# Patient Record
Sex: FEMALE | Race: WHITE
[De-identification: names, ages, dates, MRNs, and addresses within clinical notes are randomized per-mention and may not be internally consistent; named-entity substitution may affect disease eponyms.]

---

## 2017-02-10 ENCOUNTER — HOSPITAL ENCOUNTER (OUTPATIENT)
Dept: HOSPITAL 47 - RADECHMAIN | Age: 77
Discharge: HOME | End: 2017-02-10
Payer: MEDICARE

## 2017-02-10 DIAGNOSIS — R01.1: ICD-10-CM

## 2017-02-10 DIAGNOSIS — G45.9: Primary | ICD-10-CM

## 2017-02-10 PROCEDURE — 70450 CT HEAD/BRAIN W/O DYE: CPT

## 2017-02-10 PROCEDURE — 93880 EXTRACRANIAL BILAT STUDY: CPT

## 2017-02-10 PROCEDURE — 93306 TTE W/DOPPLER COMPLETE: CPT

## 2017-02-10 NOTE — ECHOF
Referral Reason:R01.1 heart murmur



MEASUREMENTS

--------

HEIGHT: 157.5 cm

WEIGHT: 83.5 kg

BP: 188/80

RVIDd:   2.8 cm     (< 3.3)

IVSd:   1.3 cm     (0.6 - 1.1)

LVIDd:   4.5 cm     (3.9 - 5.3)

LVPWd:   1.3 cm     (0.6 - 1.1)

IVSs:   1.9 cm

LVIDs:   2.9 cm

LVPWs:   1.8 cm

LA Diam:   3.8 cm     (2.7 - 3.8)

LAESV Index (A-L):   31.58 ml/m

Ao Diam:   3.4 cm     (2.0 - 3.7)

AV Cusp:   2.3 cm     (1.5 - 2.6)

MV EXCURSION:   12.039 mm     (> 18.000)

MV EF SLOPE:   37 mm/s     (70 - 150)

EPSS:   0.4 cm

MV E Rishi:   1.19 m/s

MV DecT:   284 ms

MV A Rishi:   1.33 m/s

MV E/A Ratio:   0.89 

AR PHT:   1040 ms

RAP:   5.00 mmHg

RVSP:   28.31 mmHg







FINDINGS

--------

Sinus rhythm.

This was a technically good study.

The left ventricular size is normal.   There is mild 

concentric left ventricular hypertrophy.   Overall left 

ventricular systolic function is normal with, an EF 

between 55 - 60 %.

The right ventricle is normal in size and function.

LA is midly dilated 29-33ml/m2.

The right atrium is normal in size.

There is mild to moderate aortic valve sclerosis.   

There is mild aortic regurgitation.

The mitral valve leaflets are mildly thickened.   Mild 

mitral annular calcification present.

Mild tricuspid regurgitation present.   Right 

ventricular systolic pressure is normal at < 35 mmHg.

Trace/mild (physiologic)  pulmonic regurgitation.

The aortic root size is normal.

Normal inferior vena cava with normal inspiratory 

collapse consistent with estimated right atrial 

pressure of  5 mmHg.

There is no pericardial effusion.



CONCLUSIONS

--------

1. Sinus rhythm.

2. There is mild aortic regurgitation.

3. The mitral valve leaflets are mildly thickened.

4. Mild mitral annular calcification present.

5. Mild tricuspid regurgitation present.

6. Right ventricular systolic pressure is normal at < 35 

mmHg.

7. Trace/mild (physiologic)  pulmonic regurgitation.

8. The aortic root size is normal.

9. Normal inferior vena cava with normal inspiratory 

collapse consistent with estimated right atrial 

pressure of  5 mmHg.

10. There is no pericardial effusion.

11. This was a technically good study.

12. The left ventricular size is normal.

13. There is mild concentric left ventricular hypertrophy.

14. Overall left ventricular systolic function is normal 

with, an EF between 55 - 60 %.

15. The right ventricle is normal in size and function.

16. LA is midly dilated 29-33ml/m2.

17. The right atrium is normal in size.

18. There is mild to moderate aortic valve sclerosis.





SONOGRAPHER: Thea Short RDCS

## 2017-02-10 NOTE — CT
EXAMINATION TYPE: CT brain wo con

 

DATE OF EXAM: 2/10/2017 2:41 PM

 

COMPARISON: Previous study dated 9/21/2014

 

HISTORY: VICENTE, TIA

 

CT DLP: 1085 mGycm

Automated exposure control for dose reduction was used.

 

FINDINGS: There is evidence of old, bilateral occipital infarct. The left occipital infarct extends i
nto the posterior parietal region.

 

Central structures are midline. There is no evidence of hydrocephalus. No acute focal lesion, mass ef
fect or midline shift is seen. I do not see evidence of intracranial blood.

 

Visualized portions of the paranasal sinuses and mastoids are clear. No depressed skull fracture is s
een.

 

IMPRESSION: 

1. OLD, BILATERAL OCCIPITAL INFARCT.

2. NO ACUTE INTRACRANIAL ABNORMALITY.

## 2017-02-10 NOTE — US
EXAMINATION TYPE: US carotid duplex BILAT

 

DATE OF EXAM: 2/10/2017 2:53 PM

 

COMPARISON: NONE

 

CLINICAL HISTORY: G45.9 tia.

 

EXAM MEASUREMENTS: 

 

RIGHT:  Peak Systolic Velocity (PSV) cm/sec

----- Right CCA:  67.7  

----- Right ICA:   72.1     

----- Right ECA:  99.1   

ICA/CCA ratio:     1.1    

 

RIGHT:  End Diastole cm/sec

----- Right CCA:  11.9   

----- Right ICA:   30.2      

----- Right ECA:  0.0     

 

LEFT:  Peak Systolic Velocity (PSV) cm/sec

----- Left CCA:   69.4  

----- Left ICA:    79.9   

----- Left ECA:  106.9  

ICA/CCA ratio:  1.2  

 

LEFT:  End Diastole cm/sec

----- Left CCA:  15.4  

----- Left ICA:   27.6   

----- Left ECA:  0.0 

 

VERTEBRALS (direction of flow):

Right Vertebral: Antegrade

Left Vertebral: Antegrade

 

TECHNOLOGIST IMPRESSION:  No significant stenosis seen, no elevated velocities, bilateral plaque note
d.

 

Intimal thickening is present. There are some scattered small plaques present.

 

IMPRESSION:  

1. Atheromatous plaquing and intimal thickening without significant flow-limiting stenosis.   

 

 

Criteria for Assigning % of Stenosis / Diameter reduction

(Estimation based on the indirect measurements of the internal carotid artery velocities (ICA PSV).

1.  Normal (no stenosis)=ICA PSV < 125 cm/s: ratio < 2.0: ICA EDV<40 cm/s.

2. Less than 50% stenosis=ICA PSV < 125 cm/s: ratio < 2.0: ICA EDV<40 cm/s.

3.  50 to 69% stenosis=ICA PSV of 125 to 230 cm/s: ration 2.0 ? 4.0: ICA EDV  cm/s.

4.  Greater than 70% stenosis to near occlusion= ICA PSV > 230 cm/s: ratio > 4.0: ICA EDV > 100 cm/s.
 

5.  Near occlusion= ICA PSV velocities may be low or undetectable: variable ratio and ICA EDV.

6.  Total occlusion=unable to detect flow.

## 2017-07-20 ENCOUNTER — HOSPITAL ENCOUNTER (OUTPATIENT)
Dept: HOSPITAL 47 - RADXRMAIN | Age: 77
End: 2017-07-20
Payer: MEDICARE

## 2017-07-20 DIAGNOSIS — M79.675: Primary | ICD-10-CM

## 2017-07-20 NOTE — XR
EXAMINATION TYPE: XR foot complete LT

 

DATE OF EXAM: 7/20/2017

 

CLINICAL HISTORY: pain

 

TECHNIQUE: Frontal, lateral and oblique images of the left foot are obtained.

 

COMPARISON: None.

 

FINDINGS:  There is no acute fracture/dislocation evident. Moderate hallux valgus deformity at the fi
rst metatarsal phalangeal joint. The overlying soft tissue appears unremarkable.

 

IMPRESSION:  

There is no acute fracture or dislocation.

 

ICD 10 NO FRACTURE, INITIAL EVALUATION

## 2018-02-27 ENCOUNTER — HOSPITAL ENCOUNTER (OUTPATIENT)
Dept: HOSPITAL 47 - RADXRMAIN | Age: 78
Discharge: HOME | End: 2018-02-27
Payer: MEDICARE

## 2018-02-27 DIAGNOSIS — M79.89: Primary | ICD-10-CM

## 2018-02-27 DIAGNOSIS — M25.572: ICD-10-CM

## 2018-02-27 NOTE — XR
EXAMINATION TYPE: XR ankle complete LT

 

DATE OF EXAM: 2/27/2018

 

CLINICAL HISTORY: Left ankle pain after twisting injury with swelling.

 

TECHNIQUE:  Frontal, lateral and oblique images of the left ankle are obtained.

 

COMPARISON: None.

 

FINDINGS:  There is no acute fracture/dislocation evident in the left ankle.  The ankle mortise appea
rs within normal limits. There is moderate soft tissue swelling about the ankle joint. Atheromatous c
alcifications are seen of the dorsalis pedis and posterior tibial arteries. Small plantar and moderat
e Achilles enthesophytes are noted.

 

IMPRESSION: Moderate soft tissue swelling of the ankle indicative of soft tissue injury. MR could be 
performed to further evaluate for ligamentous and tendinous injury. No evidence of fracture or disloc
ation of the left ankle.

## 2018-08-29 ENCOUNTER — HOSPITAL ENCOUNTER (OUTPATIENT)
Dept: HOSPITAL 47 - LABWHC1 | Age: 78
Discharge: HOME | End: 2018-08-29
Payer: MEDICARE

## 2018-08-29 DIAGNOSIS — E11.9: Primary | ICD-10-CM

## 2018-08-29 DIAGNOSIS — Z79.899: ICD-10-CM

## 2018-08-29 DIAGNOSIS — I10: ICD-10-CM

## 2018-08-29 LAB
ANION GAP SERPL CALC-SCNC: 10 MMOL/L
BUN SERPL-SCNC: 28 MG/DL (ref 7–17)
CHLORIDE SERPL-SCNC: 104 MMOL/L (ref 98–107)
CO2 SERPL-SCNC: 28 MMOL/L (ref 22–30)
POTASSIUM SERPL-SCNC: 4.3 MMOL/L (ref 3.5–5.1)
SODIUM SERPL-SCNC: 142 MMOL/L (ref 137–145)

## 2018-08-29 PROCEDURE — 82565 ASSAY OF CREATININE: CPT

## 2018-08-29 PROCEDURE — 80051 ELECTROLYTE PANEL: CPT

## 2018-08-29 PROCEDURE — 83880 ASSAY OF NATRIURETIC PEPTIDE: CPT

## 2018-08-29 PROCEDURE — 36415 COLL VENOUS BLD VENIPUNCTURE: CPT

## 2018-08-29 PROCEDURE — 84520 ASSAY OF UREA NITROGEN: CPT

## 2018-08-29 PROCEDURE — 83036 HEMOGLOBIN GLYCOSYLATED A1C: CPT

## 2018-08-30 LAB — HBA1C MFR BLD: 7.6 % (ref 4–6)

## 2018-11-12 ENCOUNTER — HOSPITAL ENCOUNTER (OUTPATIENT)
Dept: HOSPITAL 47 - LABWHC1 | Age: 78
Discharge: HOME | End: 2018-11-12
Payer: MEDICARE

## 2018-11-12 DIAGNOSIS — I10: Primary | ICD-10-CM

## 2018-11-12 DIAGNOSIS — Z79.899: ICD-10-CM

## 2018-11-12 DIAGNOSIS — B89: ICD-10-CM

## 2018-11-12 LAB
ANION GAP SERPL CALC-SCNC: 9.2 MMOL/L (ref 4–12)
BASOPHILS # BLD AUTO: 0 K/UL (ref 0–0.2)
BASOPHILS NFR BLD AUTO: 1 %
BUN SERPL-SCNC: 27 MG/DL (ref 9–27)
CHLORIDE SERPL-SCNC: 105 MMOL/L (ref 96–109)
CHOLEST SERPL-MCNC: 195 MG/DL (ref 0–200)
CO2 SERPL-SCNC: 30.8 MMOL/L (ref 21.6–31.8)
EOSINOPHIL # BLD AUTO: 0.2 K/UL (ref 0–0.7)
EOSINOPHIL NFR BLD AUTO: 3 %
ERYTHROCYTE [DISTWIDTH] IN BLOOD BY AUTOMATED COUNT: 3.99 M/UL (ref 3.8–5.4)
ERYTHROCYTE [DISTWIDTH] IN BLOOD: 12.9 % (ref 11.5–15.5)
HCT VFR BLD AUTO: 37.6 % (ref 34–46)
HDLC SERPL-MCNC: 54 MG/DL (ref 40–60)
HGB BLD-MCNC: 12.4 GM/DL (ref 11.4–16)
LDLC SERPL CALC-MCNC: 104 MG/DL (ref 0–131)
LYMPHOCYTES # SPEC AUTO: 2.2 K/UL (ref 1–4.8)
LYMPHOCYTES NFR SPEC AUTO: 31 %
MCH RBC QN AUTO: 31.1 PG (ref 25–35)
MCHC RBC AUTO-ENTMCNC: 33 G/DL (ref 31–37)
MCV RBC AUTO: 94.1 FL (ref 80–100)
MONOCYTES # BLD AUTO: 0.5 K/UL (ref 0–1)
MONOCYTES NFR BLD AUTO: 8 %
NEUTROPHILS # BLD AUTO: 3.9 K/UL (ref 1.3–7.7)
NEUTROPHILS NFR BLD AUTO: 56 %
PLATELET # BLD AUTO: 201 K/UL (ref 150–450)
POTASSIUM SERPL-SCNC: 4.3 MMOL/L (ref 3.5–5.5)
SODIUM SERPL-SCNC: 145 MMOL/L (ref 135–145)
TRIGL SERPL-MCNC: 185 MG/DL (ref 0–149)
VLDLC SERPL CALC-MCNC: 37 MG/DL (ref 5–40)
WBC # BLD AUTO: 7 K/UL (ref 3.8–10.6)

## 2018-11-12 PROCEDURE — 80061 LIPID PANEL: CPT

## 2018-11-12 PROCEDURE — 84520 ASSAY OF UREA NITROGEN: CPT

## 2018-11-12 PROCEDURE — 82565 ASSAY OF CREATININE: CPT

## 2018-11-12 PROCEDURE — 84443 ASSAY THYROID STIM HORMONE: CPT

## 2018-11-12 PROCEDURE — 36415 COLL VENOUS BLD VENIPUNCTURE: CPT

## 2018-11-12 PROCEDURE — 85025 COMPLETE CBC W/AUTO DIFF WBC: CPT

## 2018-11-12 PROCEDURE — 80051 ELECTROLYTE PANEL: CPT

## 2018-12-30 ENCOUNTER — HOSPITAL ENCOUNTER (EMERGENCY)
Dept: HOSPITAL 47 - EC | Age: 78
Discharge: HOME | End: 2018-12-30
Payer: MEDICARE

## 2018-12-30 VITALS — SYSTOLIC BLOOD PRESSURE: 148 MMHG | TEMPERATURE: 98.1 F | DIASTOLIC BLOOD PRESSURE: 70 MMHG | HEART RATE: 95 BPM

## 2018-12-30 VITALS — RESPIRATION RATE: 18 BRPM

## 2018-12-30 DIAGNOSIS — Z79.52: ICD-10-CM

## 2018-12-30 DIAGNOSIS — E11.9: ICD-10-CM

## 2018-12-30 DIAGNOSIS — Z87.891: ICD-10-CM

## 2018-12-30 DIAGNOSIS — Z79.899: ICD-10-CM

## 2018-12-30 DIAGNOSIS — R10.13: ICD-10-CM

## 2018-12-30 DIAGNOSIS — E78.5: ICD-10-CM

## 2018-12-30 DIAGNOSIS — R11.0: ICD-10-CM

## 2018-12-30 DIAGNOSIS — R10.31: Primary | ICD-10-CM

## 2018-12-30 DIAGNOSIS — I10: ICD-10-CM

## 2018-12-30 DIAGNOSIS — Z79.82: ICD-10-CM

## 2018-12-30 LAB
ALBUMIN SERPL-MCNC: 4.2 G/DL (ref 3.5–5)
ALP SERPL-CCNC: 68 U/L (ref 38–126)
ALT SERPL-CCNC: 25 U/L (ref 9–52)
AMYLASE SERPL-CCNC: 87 U/L (ref 30–110)
ANION GAP SERPL CALC-SCNC: 8 MMOL/L
AST SERPL-CCNC: 20 U/L (ref 14–36)
BASOPHILS # BLD AUTO: 0 K/UL (ref 0–0.2)
BASOPHILS NFR BLD AUTO: 0 %
BUN SERPL-SCNC: 33 MG/DL (ref 7–17)
CALCIUM SPEC-MCNC: 9.7 MG/DL (ref 8.4–10.2)
CHLORIDE SERPL-SCNC: 106 MMOL/L (ref 98–107)
CO2 SERPL-SCNC: 30 MMOL/L (ref 22–30)
EOSINOPHIL # BLD AUTO: 0.1 K/UL (ref 0–0.7)
EOSINOPHIL NFR BLD AUTO: 1 %
ERYTHROCYTE [DISTWIDTH] IN BLOOD BY AUTOMATED COUNT: 4.27 M/UL (ref 3.8–5.4)
ERYTHROCYTE [DISTWIDTH] IN BLOOD: 12.9 % (ref 11.5–15.5)
GLUCOSE SERPL-MCNC: 164 MG/DL (ref 74–99)
HCT VFR BLD AUTO: 39.6 % (ref 34–46)
HGB BLD-MCNC: 13.2 GM/DL (ref 11.4–16)
LIPASE SERPL-CCNC: 434 U/L (ref 23–300)
LYMPHOCYTES # SPEC AUTO: 0.8 K/UL (ref 1–4.8)
LYMPHOCYTES NFR SPEC AUTO: 5 %
MCH RBC QN AUTO: 30.9 PG (ref 25–35)
MCHC RBC AUTO-ENTMCNC: 33.3 G/DL (ref 31–37)
MCV RBC AUTO: 92.7 FL (ref 80–100)
MONOCYTES # BLD AUTO: 0.6 K/UL (ref 0–1)
MONOCYTES NFR BLD AUTO: 4 %
NEUTROPHILS # BLD AUTO: 13.3 K/UL (ref 1.3–7.7)
NEUTROPHILS NFR BLD AUTO: 89 %
PH UR: 7 [PH] (ref 5–8)
PLATELET # BLD AUTO: 205 K/UL (ref 150–450)
POTASSIUM SERPL-SCNC: 4.3 MMOL/L (ref 3.5–5.1)
PROT SERPL-MCNC: 7 G/DL (ref 6.3–8.2)
SODIUM SERPL-SCNC: 144 MMOL/L (ref 137–145)
SP GR UR: 1 (ref 1–1.03)
UROBILINOGEN UR QL STRIP: <2 MG/DL (ref ?–2)
WBC # BLD AUTO: 15 K/UL (ref 3.8–10.6)

## 2018-12-30 PROCEDURE — 36415 COLL VENOUS BLD VENIPUNCTURE: CPT

## 2018-12-30 PROCEDURE — 82150 ASSAY OF AMYLASE: CPT

## 2018-12-30 PROCEDURE — 81003 URINALYSIS AUTO W/O SCOPE: CPT

## 2018-12-30 PROCEDURE — 85025 COMPLETE CBC W/AUTO DIFF WBC: CPT

## 2018-12-30 PROCEDURE — 99284 EMERGENCY DEPT VISIT MOD MDM: CPT

## 2018-12-30 PROCEDURE — 80053 COMPREHEN METABOLIC PANEL: CPT

## 2018-12-30 PROCEDURE — 83690 ASSAY OF LIPASE: CPT

## 2018-12-30 PROCEDURE — 74176 CT ABD & PELVIS W/O CONTRAST: CPT

## 2018-12-30 PROCEDURE — 96360 HYDRATION IV INFUSION INIT: CPT

## 2018-12-30 NOTE — CT
EXAMINATION TYPE: CT abdomen pelvis wo con

 

DATE OF EXAM: 12/30/2018

 

COMPARISON: 12/26/2012

 

HISTORY: hematuria

 

CT DLP: 692.5 mGycm

Automated exposure control for dose reduction was used.

 

TECHNIQUE:  Helical acquisition of images was performed from the lung bases through the pelvis.

 

FINDINGS: 

There is some mild atelectasis at the lung bases. There is no pleural effusion. Liver shows no focal 
defect. There are numerous calcified gallstones. Bile ducts are not dilated. There is no evidence of 
a pancreatic mass. Spleen has normal size and contour.

 

There is no adrenal mass. There is slight prominence of the left ureter but no ureteral stones seen. 
Bladder distends smoothly. There is no free fluid in the pelvis. I see no renal calculus. There is va
scular calcification. There is no retroperitoneal adenopathy.

 

There is no ascites. There are a few sigmoid diverticula. There is no sign of diverticulitis. There i
s no inguinal hernia. Appendix appears normal. There is no sign of a bowel obstruction. There are spo
ndylotic changes in the lumbar spine. I see no focal bone destruction. There is no evidence of free a
ir.

 

IMPRESSION: 

THERE IS VERY MILD ENLARGEMENT OF THE LEFT URETER AND TO LESSER EXTENT THE RIGHT URETER COMPARED TO O
LD EXAM WITHOUT A STONE SEEN. NO RENAL CALCULUS SEEN. I DO NOT SUSPECT RENAL OBSTRUCTION.

 

THERE IS CLEARING OF THE INFLAMMATORY CHANGES OF THE DESCENDING COLON COMPARED TO OLD EXAM. THERE IS 
CLEARING OF THE DILATED SMALL BOWEL COMPARED TO OLD EXAM.

 

NUMEROUS CALCIFIED GALLSTONES. I DO NOT SEE A CAUSE FOR HEMATURIA.

## 2018-12-30 NOTE — ED
Abdominal Pain HPI





- General


Chief Complaint: Urogenital


Stated Complaint: Hematuria


Time Seen by Provider: 12/30/18 13:52


Source: patient, RN notes reviewed, old records reviewed


Mode of arrival: ambulatory


Limitations: no limitations





- History of Present Illness


Initial Comments: 





This is a 78-year-old female the ER for evaluation.  States she is presenting 

for evaluation regards to abdominal pain.  Right flank pain.  Patient also had 

hematuria symptoms episodic intermittent 3 days.  No prior history of similar 

complaint no time is no urge incontinence no fever no burning with urination.  

No prior history of kidney stones currently.  Patient denies any other 

complaints no current abdominal pain.  No bowel issues


MD Complaint: abdominal pain, flank pain (Right)


-: days(s) (3)


Location: suprapubic


Radiation: RLQ, R flank


Migration to: epigastric


Severity: moderate


Severity scale (1-10): 6


Quality: stabbing


Consistency: constant


Improves With: nothing


Worsens With: nothing


Associated Symptoms: denies other symptoms, nausea





- Related Data


 Home Medications











 Medication  Instructions  Recorded  Confirmed


 


Aspirin 81 mg PO DAILY 09/22/14 12/30/18


 


Furosemide [Lasix] 40 mg PO DAILY 09/22/14 12/30/18


 


Simvastatin [Zocor] 80 mg PO HS 09/22/14 12/30/18


 


Carvedilol [Coreg] 6.25 mg PO BID 12/30/18 12/30/18


 


Cholecalciferol [Vitamin D3] 1,000 unit PO DAILY 12/30/18 12/30/18


 


Linagliptin [Tradjenta] 5 mg PO HS 12/30/18 12/30/18


 


Magnesium 200 mg PO DAILY 12/30/18 12/30/18


 


Multivitamins, Thera [Multivitamin 1 tab PO DAILY 12/30/18 12/30/18





(formulary)]   


 


Repaglinide 0.5 mg PO BID-W/MEALS 12/30/18 12/30/18


 


predniSONE 10 mg PO DAILY 12/30/18 12/30/18











 Allergies











Allergy/AdvReac Type Severity Reaction Status Date / Time


 


No Known Allergies Allergy   Verified 12/30/18 13:58














Review of Systems


ROS Statement: 


Those systems with pertinent positive or pertinent negative responses have been 

documented in the HPI.





ROS Other: All systems not noted in ROS Statement are negative.





Past Medical History


Past Medical History: Diabetes Mellitus, Hyperlipidemia, Hypertension


History of Any Multi-Drug Resistant Organisms: None Reported


Past Surgical History: Hysterectomy, Tonsillectomy


Additional Past Surgical History / Comment(s): right leg saw stripping


Past Anesthesia/Blood Transfusion Reactions: No Reported Reaction


Past Psychological History: No Psychological Hx Reported


Smoking Status: Former smoker


Past Alcohol Use History: None Reported


Past Drug Use History: None Reported





- Past Family History


  ** Father


Family Medical History: No Reported History





  ** Mother


Family Medical History: No Reported History





General Exam


Limitations: no limitations


General appearance: alert, in no apparent distress


Head exam: Present: atraumatic, normocephalic, normal inspection


Eye exam: Present: normal appearance, PERRL, EOMI.  Absent: scleral icterus, 

conjunctival injection, periorbital swelling


ENT exam: Present: normal exam, mucous membranes moist


Neck exam: Present: normal inspection.  Absent: tenderness, meningismus, 

lymphadenopathy


Respiratory exam: Present: normal lung sounds bilaterally.  Absent: respiratory 

distress, wheezes, rales, rhonchi, stridor


Cardiovascular Exam: Present: regular rate, normal rhythm, normal heart sounds.

  Absent: systolic murmur, diastolic murmur, rubs, gallop, clicks


GI/Abdominal exam: Present: soft, normal bowel sounds.  Absent: distended, 

tenderness, guarding, rebound, rigid


Extremities exam: Present: normal inspection, full ROM, normal capillary 

refill.  Absent: tenderness, pedal edema, joint swelling, calf tenderness


Back exam: Present: normal inspection


Neurological exam: Present: alert, oriented X3, CN II-XII intact


Psychiatric exam: Present: normal affect, normal mood


Skin exam: Present: warm, dry, intact, normal color.  Absent: rash





Course


 Vital Signs











  12/30/18





  13:22


 


Temperature 98.5 F


 


Pulse Rate 97


 


Respiratory 18





Rate 


 


Blood Pressure 106/57


 


O2 Sat by Pulse 97





Oximetry 














- Reevaluation(s)


Reevaluation #1: 





12/30/18 17:43


Medical record is reviewed





A she is without significant complaint able to urinate without difficulty,


Reevaluation #2: 





12/30/18 18:13


Patient did have prevoid greater than 500 post void less than 200





Medical Decision Making





- Medical Decision Making





78 female the ER with flank pain that section recently passed kidney stone.  No 

blood in the urine no urinary check infection we'll culture urine, patient can 

be discharged home





- Lab Data


Result diagrams: 


 12/30/18 14:15





 12/30/18 14:15


 Lab Results











  12/30/18 12/30/18 12/30/18 Range/Units





  14:15 14:15 15:20 


 


WBC   15.0 H   (3.8-10.6)  k/uL


 


RBC   4.27   (3.80-5.40)  m/uL


 


Hgb   13.2   (11.4-16.0)  gm/dL


 


Hct   39.6   (34.0-46.0)  %


 


MCV   92.7   (80.0-100.0)  fL


 


MCH   30.9   (25.0-35.0)  pg


 


MCHC   33.3   (31.0-37.0)  g/dL


 


RDW   12.9   (11.5-15.5)  %


 


Plt Count   205   (150-450)  k/uL


 


Neutrophils %   89   %


 


Lymphocytes %   5   %


 


Monocytes %   4   %


 


Eosinophils %   1   %


 


Basophils %   0   %


 


Neutrophils #   13.3 H   (1.3-7.7)  k/uL


 


Lymphocytes #   0.8 L   (1.0-4.8)  k/uL


 


Monocytes #   0.6   (0-1.0)  k/uL


 


Eosinophils #   0.1   (0-0.7)  k/uL


 


Basophils #   0.0   (0-0.2)  k/uL


 


Sodium  144    (137-145)  mmol/L


 


Potassium  4.3    (3.5-5.1)  mmol/L


 


Chloride  106    ()  mmol/L


 


Carbon Dioxide  30    (22-30)  mmol/L


 


Anion Gap  8    mmol/L


 


BUN  33 H    (7-17)  mg/dL


 


Creatinine  1.53 H    (0.52-1.04)  mg/dL


 


Est GFR (CKD-EPI)AfAm  38    (>60 ml/min/1.73 sqM)  


 


Est GFR (CKD-EPI)NonAf  33    (>60 ml/min/1.73 sqM)  


 


Glucose  164 H    (74-99)  mg/dL


 


Calcium  9.7    (8.4-10.2)  mg/dL


 


Total Bilirubin  0.5    (0.2-1.3)  mg/dL


 


AST  20    (14-36)  U/L


 


ALT  25    (9-52)  U/L


 


Alkaline Phosphatase  68    ()  U/L


 


Total Protein  7.0    (6.3-8.2)  g/dL


 


Albumin  4.2    (3.5-5.0)  g/dL


 


Amylase  87    ()  U/L


 


Lipase  434 H    ()  U/L


 


Urine Color    Light Yellow  


 


Urine Appearance    Clear  (Clear)  


 


Urine pH    7.0  (5.0-8.0)  


 


Ur Specific Gravity    1.005  (1.001-1.035)  


 


Urine Protein    Negative  (Negative)  


 


Urine Glucose (UA)    Negative  (Negative)  


 


Urine Ketones    Negative  (Negative)  


 


Urine Blood    Negative  (Negative)  


 


Urine Nitrite    Negative  (Negative)  


 


Urine Bilirubin    Negative  (Negative)  


 


Urine Urobilinogen    <2.0  (<2.0)  mg/dL


 


Ur Leukocyte Esterase    Negative  (Negative)  














- Radiology Data


Radiology results: report reviewed (CT head and pelvis is negative for 

significant acute disease), image reviewed





Disposition


Clinical Impression: 


 Right flank pain





Disposition: HOME SELF-CARE


Condition: Good


Instructions:  Flank Pain (ED)


Is patient prescribed a controlled substance at d/c from ED?: No


Referrals: 


Poli Pate MD [Primary Care Provider] - 1-2 days

## 2019-02-05 ENCOUNTER — HOSPITAL ENCOUNTER (OUTPATIENT)
Dept: HOSPITAL 47 - LABWHC1 | Age: 79
Discharge: HOME | End: 2019-02-05
Payer: MEDICARE

## 2019-02-05 DIAGNOSIS — E11.9: ICD-10-CM

## 2019-02-05 DIAGNOSIS — I10: Primary | ICD-10-CM

## 2019-02-05 LAB
AMYLASE SERPL-CCNC: 78 U/L (ref 23–121)
ANION GAP SERPL CALC-SCNC: 8.1 MMOL/L (ref 4–12)
BUN SERPL-SCNC: 42 MG/DL (ref 9–27)
CHLORIDE SERPL-SCNC: 104 MMOL/L (ref 96–109)
CO2 SERPL-SCNC: 34.9 MMOL/L (ref 21.6–31.8)
LIPASE SERPL-CCNC: 100 U/L (ref 14–63)
POTASSIUM SERPL-SCNC: 4.3 MMOL/L (ref 3.5–5.5)
SODIUM SERPL-SCNC: 147 MMOL/L (ref 135–145)

## 2019-02-05 PROCEDURE — 80051 ELECTROLYTE PANEL: CPT

## 2019-02-05 PROCEDURE — 82565 ASSAY OF CREATININE: CPT

## 2019-02-05 PROCEDURE — 84520 ASSAY OF UREA NITROGEN: CPT

## 2019-02-05 PROCEDURE — 83690 ASSAY OF LIPASE: CPT

## 2019-02-05 PROCEDURE — 82150 ASSAY OF AMYLASE: CPT

## 2019-02-05 PROCEDURE — 36415 COLL VENOUS BLD VENIPUNCTURE: CPT

## 2019-04-15 ENCOUNTER — HOSPITAL ENCOUNTER (OUTPATIENT)
Dept: HOSPITAL 47 - LABWHC1 | Age: 79
Discharge: HOME | End: 2019-04-15
Payer: MEDICARE

## 2019-04-15 DIAGNOSIS — E11.9: Primary | ICD-10-CM

## 2019-04-15 LAB
ANION GAP SERPL CALC-SCNC: 9.1 MMOL/L (ref 4–12)
BUN SERPL-SCNC: 31 MG/DL (ref 9–27)
CHLORIDE SERPL-SCNC: 104 MMOL/L (ref 96–109)
CHOLEST SERPL-MCNC: 212 MG/DL (ref 0–200)
CO2 SERPL-SCNC: 30.9 MMOL/L (ref 21.6–31.8)
HBA1C MFR BLD: 9.4 % (ref 4–6)
HDLC SERPL-MCNC: 52 MG/DL (ref 40–60)
LDLC SERPL CALC-MCNC: 115.4 MG/DL (ref 0–131)
POTASSIUM SERPL-SCNC: 4.2 MMOL/L (ref 3.5–5.5)
SODIUM SERPL-SCNC: 144 MMOL/L (ref 135–145)
TRIGL SERPL-MCNC: 223 MG/DL (ref 0–149)
VLDLC SERPL CALC-MCNC: 44.6 MG/DL (ref 5–40)

## 2019-04-15 PROCEDURE — 36415 COLL VENOUS BLD VENIPUNCTURE: CPT

## 2019-04-15 PROCEDURE — 82570 ASSAY OF URINE CREATININE: CPT

## 2019-04-15 PROCEDURE — 80061 LIPID PANEL: CPT

## 2019-04-15 PROCEDURE — 83036 HEMOGLOBIN GLYCOSYLATED A1C: CPT

## 2019-04-15 PROCEDURE — 84520 ASSAY OF UREA NITROGEN: CPT

## 2019-04-15 PROCEDURE — 82565 ASSAY OF CREATININE: CPT

## 2019-04-15 PROCEDURE — 80051 ELECTROLYTE PANEL: CPT

## 2019-04-15 PROCEDURE — 82043 UR ALBUMIN QUANTITATIVE: CPT

## 2019-04-26 ENCOUNTER — HOSPITAL ENCOUNTER (OUTPATIENT)
Dept: HOSPITAL 47 - LABWHC1 | Age: 79
Discharge: HOME | End: 2019-04-26
Payer: MEDICARE

## 2019-04-26 DIAGNOSIS — B89: Primary | ICD-10-CM

## 2019-04-26 LAB
BASOPHILS # BLD AUTO: 0 K/UL (ref 0–0.2)
BASOPHILS NFR BLD AUTO: 0 %
EOSINOPHIL # BLD AUTO: 0.2 K/UL (ref 0–0.7)
EOSINOPHIL NFR BLD AUTO: 1 %
ERYTHROCYTE [DISTWIDTH] IN BLOOD BY AUTOMATED COUNT: 4.25 M/UL (ref 3.8–5.4)
ERYTHROCYTE [DISTWIDTH] IN BLOOD: 13.6 % (ref 11.5–15.5)
HCT VFR BLD AUTO: 39.4 % (ref 34–46)
HGB BLD-MCNC: 13.2 GM/DL (ref 11.4–16)
LYMPHOCYTES # SPEC AUTO: 1.3 K/UL (ref 1–4.8)
LYMPHOCYTES NFR SPEC AUTO: 12 %
MCH RBC QN AUTO: 30.9 PG (ref 25–35)
MCHC RBC AUTO-ENTMCNC: 33.4 G/DL (ref 31–37)
MCV RBC AUTO: 92.7 FL (ref 80–100)
MONOCYTES # BLD AUTO: 0.4 K/UL (ref 0–1)
MONOCYTES NFR BLD AUTO: 4 %
NEUTROPHILS # BLD AUTO: 9 K/UL (ref 1.3–7.7)
NEUTROPHILS NFR BLD AUTO: 81 %
PLATELET # BLD AUTO: 214 K/UL (ref 150–450)
WBC # BLD AUTO: 11.1 K/UL (ref 3.8–10.6)

## 2019-04-26 PROCEDURE — 36415 COLL VENOUS BLD VENIPUNCTURE: CPT

## 2019-04-26 PROCEDURE — 85025 COMPLETE CBC W/AUTO DIFF WBC: CPT

## 2019-06-05 ENCOUNTER — HOSPITAL ENCOUNTER (EMERGENCY)
Dept: HOSPITAL 47 - EC | Age: 79
Discharge: HOME | End: 2019-06-05
Payer: MEDICARE

## 2019-06-05 VITALS — TEMPERATURE: 98.3 F

## 2019-06-05 VITALS — DIASTOLIC BLOOD PRESSURE: 78 MMHG | HEART RATE: 78 BPM | SYSTOLIC BLOOD PRESSURE: 165 MMHG

## 2019-06-05 VITALS — RESPIRATION RATE: 18 BRPM

## 2019-06-05 DIAGNOSIS — S09.90XA: Primary | ICD-10-CM

## 2019-06-05 DIAGNOSIS — Z79.899: ICD-10-CM
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
## 2019-06-05 DIAGNOSIS — Z79.51: ICD-10-CM

## 2019-06-05 DIAGNOSIS — E11.9: ICD-10-CM

## 2019-06-05 DIAGNOSIS — Z87.891: ICD-10-CM

## 2019-06-05 DIAGNOSIS — M25.551: ICD-10-CM

## 2019-06-05 DIAGNOSIS — N39.0: ICD-10-CM

## 2019-06-05 DIAGNOSIS — Z79.84: ICD-10-CM

## 2019-06-05 DIAGNOSIS — I51.7: ICD-10-CM

## 2019-06-05 DIAGNOSIS — E78.5: ICD-10-CM

## 2019-06-05 DIAGNOSIS — Z79.82: ICD-10-CM

## 2019-06-05 DIAGNOSIS — Z79.02: ICD-10-CM

## 2019-06-05 DIAGNOSIS — I10: ICD-10-CM
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
## 2019-06-05 LAB
ALBUMIN SERPL-MCNC: 4.1 G/DL (ref 3.5–5)
ALP SERPL-CCNC: 68 U/L (ref 38–126)
ALT SERPL-CCNC: 25 U/L (ref 9–52)
ANION GAP SERPL CALC-SCNC: 8 MMOL/L
APTT BLD: 22.2 SEC (ref 22–30)
AST SERPL-CCNC: 34 U/L (ref 14–36)
BASOPHILS # BLD AUTO: 0 K/UL (ref 0–0.2)
BASOPHILS NFR BLD AUTO: 0 %
BUN SERPL-SCNC: 31 MG/DL (ref 7–17)
CALCIUM SPEC-MCNC: 9.3 MG/DL (ref 8.4–10.2)
CHLORIDE SERPL-SCNC: 104 MMOL/L (ref 98–107)
CO2 SERPL-SCNC: 32 MMOL/L (ref 22–30)
EOSINOPHIL # BLD AUTO: 0.3 K/UL (ref 0–0.7)
EOSINOPHIL NFR BLD AUTO: 2 %
ERYTHROCYTE [DISTWIDTH] IN BLOOD BY AUTOMATED COUNT: 4.1 M/UL (ref 3.8–5.4)
ERYTHROCYTE [DISTWIDTH] IN BLOOD: 13.8 % (ref 11.5–15.5)
GLUCOSE SERPL-MCNC: 65 MG/DL (ref 74–99)
HCT VFR BLD AUTO: 36.6 % (ref 34–46)
HGB BLD-MCNC: 12.3 GM/DL (ref 11.4–16)
INR PPP: 1 (ref ?–1.2)
LYMPHOCYTES # SPEC AUTO: 1.4 K/UL (ref 1–4.8)
LYMPHOCYTES NFR SPEC AUTO: 12 %
MAGNESIUM SPEC-SCNC: 2.5 MG/DL (ref 1.6–2.3)
MCH RBC QN AUTO: 29.9 PG (ref 25–35)
MCHC RBC AUTO-ENTMCNC: 33.5 G/DL (ref 31–37)
MCV RBC AUTO: 89.2 FL (ref 80–100)
MONOCYTES # BLD AUTO: 0.7 K/UL (ref 0–1)
MONOCYTES NFR BLD AUTO: 6 %
NEUTROPHILS # BLD AUTO: 8.9 K/UL (ref 1.3–7.7)
NEUTROPHILS NFR BLD AUTO: 78 %
PH UR: 7.5 [PH] (ref 5–8)
PLATELET # BLD AUTO: 278 K/UL (ref 150–450)
POTASSIUM SERPL-SCNC: 3.8 MMOL/L (ref 3.5–5.1)
PROT SERPL-MCNC: 6.6 G/DL (ref 6.3–8.2)
PT BLD: 10.3 SEC (ref 9–12)
RBC UR QL: 9 /HPF (ref 0–5)
SODIUM SERPL-SCNC: 144 MMOL/L (ref 137–145)
SP GR UR: 1.01 (ref 1–1.03)
SQUAMOUS UR QL AUTO: 1 /HPF (ref 0–4)
UROBILINOGEN UR QL STRIP: <2 MG/DL (ref ?–2)
WBC # BLD AUTO: 11.5 K/UL (ref 3.8–10.6)
WBC #/AREA URNS HPF: >182 /HPF (ref 0–5)

## 2019-06-05 PROCEDURE — 96374 THER/PROPH/DIAG INJ IV PUSH: CPT

## 2019-06-05 PROCEDURE — 83880 ASSAY OF NATRIURETIC PEPTIDE: CPT

## 2019-06-05 PROCEDURE — 84484 ASSAY OF TROPONIN QUANT: CPT

## 2019-06-05 PROCEDURE — 71046 X-RAY EXAM CHEST 2 VIEWS: CPT

## 2019-06-05 PROCEDURE — 87086 URINE CULTURE/COLONY COUNT: CPT

## 2019-06-05 PROCEDURE — 70450 CT HEAD/BRAIN W/O DYE: CPT

## 2019-06-05 PROCEDURE — 93005 ELECTROCARDIOGRAM TRACING: CPT

## 2019-06-05 PROCEDURE — 85025 COMPLETE CBC W/AUTO DIFF WBC: CPT

## 2019-06-05 PROCEDURE — 81001 URINALYSIS AUTO W/SCOPE: CPT

## 2019-06-05 PROCEDURE — 85730 THROMBOPLASTIN TIME PARTIAL: CPT

## 2019-06-05 PROCEDURE — 36415 COLL VENOUS BLD VENIPUNCTURE: CPT

## 2019-06-05 PROCEDURE — 87040 BLOOD CULTURE FOR BACTERIA: CPT

## 2019-06-05 PROCEDURE — 96361 HYDRATE IV INFUSION ADD-ON: CPT

## 2019-06-05 PROCEDURE — 99284 EMERGENCY DEPT VISIT MOD MDM: CPT

## 2019-06-05 PROCEDURE — 83735 ASSAY OF MAGNESIUM: CPT

## 2019-06-05 PROCEDURE — 73502 X-RAY EXAM HIP UNI 2-3 VIEWS: CPT

## 2019-06-05 PROCEDURE — 80053 COMPREHEN METABOLIC PANEL: CPT

## 2019-06-05 PROCEDURE — 72125 CT NECK SPINE W/O DYE: CPT

## 2019-06-05 PROCEDURE — 85610 PROTHROMBIN TIME: CPT

## 2019-06-05 NOTE — CT
EXAMINATION TYPE: CT brain cspine wo con

 

DATE OF EXAM: 6/5/2019

 

COMPARISON: CT brain February 10, 2017.

 

HISTORY: headache and neck pain post fall with blow to head.

 

CT DLP: 1294.3 mGycm. Automated Exposure Control for Dose Reduction was Utilized.

 

 

TECHNIQUE: CT scan of the head and cervical spine are performed without contrast.

 

FINDINGS:   There is no acute intracranial hemorrhage or midline shift identified. There is ventricul
ar and sulcal prominence. There is low-attenuation in the deep and periventricular white matter with 
area of old infarct left parietal region axial image 34 posterior watershed redemonstrated. The globe
s are intact and the visualized sinuses are clear. The calvarium is intact. Moderate calcified plaque
 distal internal carotid arteries is present bilaterally.

 

Cervical spine is visualized in its entirety from C1 through upper thoracic levels and demonstrates s
atisfactory alignment without evidence of acute fracture or dislocation.  Prevertebral soft tissue ap
pears within normal limits.  The C1-C2 articulation is within normal limits on the coronal images.  V
ertebral body heights are maintained. There is moderate multilevel disc space narrowing and spurring 
C3-C4 through C5-C6 levels. Posterior spur disc complexes are effacing anterior thecal sac at C4-C5 a
nd C5-C6 levels on sagittal images. Review of axial images shows multilevel uncovertebral facet degen
erative changes contributing to multilevel bilateral neural foraminal narrowing. Left thyroid lobe is
 small in size. Visualized lung apices show emphysematous change and moderate pleural/parenchymal sca
rring posteriorly. There is mild to moderate calcified plaque centered near bilateral carotid bulbs.

 

IMPRESSION:

1. There is no acute fracture or dislocation evident in the cervical spine.

2. No acute intracranial hemorrhage or midline shift is seen. No significant change from prior.

## 2019-06-05 NOTE — XR
EXAMINATION TYPE: XR Hip RT and AP Pelvis

 

DATE OF EXAM: 6/5/2019

 

COMPARISON: CT abdomen pelvis December 30, 2018.

 

HISTORY: Pelvic and right hip pain after falling tree today.

 

TECHNIQUE: A single AP view of the pelvis is obtained. Two views of the right hip are obtained.  

 

FINDINGS:  There is no acute fracture/dislocation evident in the pelvis.  The sacroiliac joints appea
r symmetric and unremarkable.  Mild to moderate axial joint space loss and acetabular spurring in bot
h hips is redemonstrated. Vascular calcification bilateral groin region is seen.

 

Two views of right hip show no acute fracture or dislocation. There is redemonstration of well-define
d ossific fragment from the greater trochanter superior aspect. No focal lytic or sclerotic lesion se
en in the proximal right femur. Right groin vascular calcification noted.  

 

IMPRESSION:  There is no acute fracture or dislocation in the pelvis or right hip.

## 2019-06-05 NOTE — ED
General Adult HPI





- General


Chief complaint: Dizziness


Stated complaint: fall, dizziness


Time Seen by Provider: 06/05/19 10:15


Source: patient, RN notes reviewed


Mode of arrival: ambulatory


Limitations: no limitations





- History of Present Illness


Initial comments: 





78-year-old female with a past medical history of hyperlipidemia, hypertension, 

diabetes mellitus presents to the emergency department for a chief complaint of 

weakness.  Patient states that she got up from her bed to urinate several times 

throughout the night and had no difficulties.  However when she got up today she

felt a little dizzy and had some generalized weakness.  States her arms and legs

bilaterally did not want to work properly.  Patient does state that she has had 

this dizziness for years and it comes and goes.  States she fell and hit her 

head against a china cabinet.  Patient also fell on her right hip.  States it is

painful but she is able to ambulate on it.  Patient denies loss of 

consciousness.  She does admit to taking Plavix.  Patient states she has a big 

"goose egg" on her head, denies any significant headaches.  Denies any neck or 

back pain.  Denies any fevers or chills.  Denies cough or dysuria.Patient has no

other complaints at this time including shortness of breath, chest pain, 

abdominal pain, nausea or vomiting, headache, or visual changes.





- Related Data


                                Home Medications











 Medication  Instructions  Recorded  Confirmed


 


Aspirin 81 mg PO DAILY 09/22/14 06/05/19


 


Furosemide [Lasix] 40 mg PO DAILY 09/22/14 06/05/19


 


Simvastatin [Zocor] 80 mg PO HS 09/22/14 06/05/19


 


Magnesium 200 mg PO DAILY 12/30/18 06/05/19


 


Multivitamins, Thera [Multivitamin 1 tab PO DAILY 12/30/18 06/05/19





(formulary)]   


 


Repaglinide 0.5 mg PO BID-W/MEALS 12/30/18 06/05/19


 


predniSONE 10 mg PO DAILY 12/30/18 06/05/19


 


Carvedilol [Coreg] 12.5 mg PO BID 06/05/19 06/05/19


 


Clopidogrel [Plavix] 75 mg PO DAILY 06/05/19 06/05/19


 


Ferrous Sulfate [Feosol] 325 mg PO DAILY 06/05/19 06/05/19








                                  Previous Rx's











 Medication  Instructions  Recorded


 


Cephalexin [Keflex] 500 mg PO Q6HR 7 Days  cap 06/05/19











                                    Allergies











Allergy/AdvReac Type Severity Reaction Status Date / Time


 


No Known Allergies Allergy   Verified 06/05/19 10:15














Review of Systems


ROS Statement: 


Those systems with pertinent positive or pertinent negative responses have been 

documented in the HPI.





ROS Other: All systems not noted in ROS Statement are negative.





Past Medical History


Past Medical History: Diabetes Mellitus, Hyperlipidemia, Hypertension


History of Any Multi-Drug Resistant Organisms: None Reported


Past Surgical History: Hysterectomy, Tonsillectomy


Additional Past Surgical History / Comment(s): right leg saw stripping


Past Anesthesia/Blood Transfusion Reactions: No Reported Reaction


Past Psychological History: No Psychological Hx Reported


Smoking Status: Former smoker


Past Alcohol Use History: None Reported


Past Drug Use History: None Reported





- Past Family History


  ** Father


Family Medical History: No Reported History





  ** Mother


Family Medical History: No Reported History





General Exam


Limitations: no limitations


General appearance: alert, in no apparent distress


Head exam: Present: normocephalic.  Absent: atraumatic (patient has a large 5 cm

 x 5 cm hematoma noted over the right parietal scalp)


Eye exam: Present: normal appearance, PERRL, EOMI.  Absent: scleral icterus, 

conjunctival injection, periorbital swelling, periorbital tenderness (neg 

raccoon sign)


ENT exam: Present: normal exam, normal oropharynx, mucous membranes moist, TM's 

normal bilaterally (neg hemotympanum), normal external ear exam


Neck exam: Present: normal inspection, full ROM.  Absent: tenderness, 

meningismus, lymphadenopathy


Respiratory exam: Present: normal lung sounds bilaterally.  Absent: respiratory 

distress, wheezes, rales, rhonchi, stridor


Cardiovascular Exam: Present: regular rate, normal rhythm, normal heart sounds. 

 Absent: systolic murmur, diastolic murmur, rubs, gallop, clicks


GI/Abdominal exam: Present: soft, normal bowel sounds.  Absent: distended, 

tenderness, guarding, rebound, rigid


Extremities exam: Present: full ROM (Full ROM of the right hip), normal 

capillary refill (Capillary refill less than 2 seconds, DP pulse 2+ in the right

 lower extremity).  Absent: tenderness (No tenderness noted in the right hip), 

calf tenderness (Tenderness, negative Homans sign)


Back exam: Absent: vertebral tenderness (No tenderness noted of the cervical 

thoracic or lumbar spine)


Neurological exam: Present: alert, oriented X3, CN II-XII intact, other (GCS 15)


  ** Expanded


Patient oriented to: Present: person, place, time


Speech: Present: fluid speech


Cranial nerves: EOM's Intact: Normal, Tongue Deviation: Normal, Nystagmus: 

Normal, Facial Sensation: Normal


Cerebellar function: Finger to Nose: Normal


Upper motor neuron: Pronator Drift: Normal


Sensory exam: Upper Extremity Light Touch: Normal, Upper Extremity Pin Prick: 

Normal, Lower Extremity Light Touch: Normal, Lower Extremity Pin Prick: Normal


Motor strength exam: RUE: 5, LUE: 5, RLE: 5, LLE: 5


Eye Response: (4) open spontaneously


Motor Response: (6) obeys commands


Verbal Response: (5) oriented


Shanelle Total: 15


Psychiatric exam: Present: normal affect, normal mood





Course


                                   Vital Signs











  06/05/19 06/05/19





  10:06 11:38


 


Temperature 97.8 F 98.3 F


 


Pulse Rate 61 60


 


Respiratory 18 18





Rate  


 


Blood Pressure 165/77 178/84


 


O2 Sat by Pulse 98 95





Oximetry  














Medical Decision Making





- Medical Decision Making





78-year-old female presents to the emergency department for a chief complaint of

 weakness.  States that she got up from her bed to urinate several times at the 

night but had no difficulty.  However when she got up today she felt a little 

dizzy and had some generalized weakness.  States that she has had this dizziness

 for years and comes and goes.  Denies dizziness at this time.  On exam patient 

does not have any abdominal or CVA tenderness.  She is well-appearing. 

Complaining of right hip pain.  However is ambulatory with minimal assistance.  

Neurovasc status intact in the right lower leg.  No thoracic or lumbar spine 

tenderness.  CBC unremarkable.  CMP does show a creatinine of 1.47, near 

patient's baseline.  Troponin less than 0.012. CT brain shows no acute 

intracranial hemorrhage or midline shift.  No significant change from prior.  CT

 C-spine shows no acute fracture or dislocation.  No acute fracture or 

dislocation noted in the right hip or pelvis.  Chest x-ray shows cardiomegaly 

and chronic parenchymal changes without acute pulmonary process.  No suspicious 

focal airspace opacity.  High riding humeral head noted with possible rotator 

cuff tears bilaterally however patient denying any acute pain at this time.  

Patient does have an acute urinary tract infection noted.  Likely cause of p

atient's symptoms.  Patient will be given Rocephin IV here in the emergency 

department.  Discussed with family and they're comfortable taking patient home. 

 Patient lives with daughter who can assist her. Patient ambulatory in the 

ER,feeling much better than this morning. Patient will be given Keflex 

outpatient.  However did discuss following up with primary care in 1-2 days and 

returning here if patient has any worsening symptoms or develops fevers.





- Lab Data


Result diagrams: 


                                 06/05/19 11:20





                                 06/05/19 11:20


                                   Lab Results











  06/05/19 06/05/19 06/05/19 Range/Units





  11:20 11:20 11:20 


 


WBC  11.5 H    (3.8-10.6)  k/uL


 


RBC  4.10    (3.80-5.40)  m/uL


 


Hgb  12.3    (11.4-16.0)  gm/dL


 


Hct  36.6    (34.0-46.0)  %


 


MCV  89.2    (80.0-100.0)  fL


 


MCH  29.9    (25.0-35.0)  pg


 


MCHC  33.5    (31.0-37.0)  g/dL


 


RDW  13.8    (11.5-15.5)  %


 


Plt Count  278    (150-450)  k/uL


 


Neutrophils %  78    %


 


Lymphocytes %  12    %


 


Monocytes %  6    %


 


Eosinophils %  2    %


 


Basophils %  0    %


 


Neutrophils #  8.9 H    (1.3-7.7)  k/uL


 


Lymphocytes #  1.4    (1.0-4.8)  k/uL


 


Monocytes #  0.7    (0-1.0)  k/uL


 


Eosinophils #  0.3    (0-0.7)  k/uL


 


Basophils #  0.0    (0-0.2)  k/uL


 


PT     (9.0-12.0)  sec


 


INR     (<1.2)  


 


APTT     (22.0-30.0)  sec


 


Sodium   144   (137-145)  mmol/L


 


Potassium   3.8   (3.5-5.1)  mmol/L


 


Chloride   104   ()  mmol/L


 


Carbon Dioxide   32 H   (22-30)  mmol/L


 


Anion Gap   8   mmol/L


 


BUN   31 H   (7-17)  mg/dL


 


Creatinine   1.47 H   (0.52-1.04)  mg/dL


 


Est GFR (CKD-EPI)AfAm   39   (>60 ml/min/1.73 sqM)  


 


Est GFR (CKD-EPI)NonAf   34   (>60 ml/min/1.73 sqM)  


 


Glucose   65 L   (74-99)  mg/dL


 


Calcium   9.3   (8.4-10.2)  mg/dL


 


Magnesium   2.5 H   (1.6-2.3)  mg/dL


 


Total Bilirubin   0.6   (0.2-1.3)  mg/dL


 


AST   34   (14-36)  U/L


 


ALT   25   (9-52)  U/L


 


Alkaline Phosphatase   68   ()  U/L


 


Troponin I     (0.000-0.034)  ng/mL


 


NT-Pro-B Natriuret Pep    376  pg/mL


 


Total Protein   6.6   (6.3-8.2)  g/dL


 


Albumin   4.1   (3.5-5.0)  g/dL


 


Urine Color     


 


Urine Appearance     (Clear)  


 


Urine pH     (5.0-8.0)  


 


Ur Specific Gravity     (1.001-1.035)  


 


Urine Protein     (Negative)  


 


Urine Glucose (UA)     (Negative)  


 


Urine Ketones     (Negative)  


 


Urine Blood     (Negative)  


 


Urine Nitrite     (Negative)  


 


Urine Bilirubin     (Negative)  


 


Urine Urobilinogen     (<2.0)  mg/dL


 


Ur Leukocyte Esterase     (Negative)  


 


Urine RBC     (0-5)  /hpf


 


Urine WBC     (0-5)  /hpf


 


Urine WBC Clumps     (None)  /hpf


 


Ur Squamous Epith Cells     (0-4)  /hpf


 


Urine Bacteria     (None)  /hpf














  06/05/19 06/05/19 06/05/19 Range/Units





  11:20 11:20 11:20 


 


WBC     (3.8-10.6)  k/uL


 


RBC     (3.80-5.40)  m/uL


 


Hgb     (11.4-16.0)  gm/dL


 


Hct     (34.0-46.0)  %


 


MCV     (80.0-100.0)  fL


 


MCH     (25.0-35.0)  pg


 


MCHC     (31.0-37.0)  g/dL


 


RDW     (11.5-15.5)  %


 


Plt Count     (150-450)  k/uL


 


Neutrophils %     %


 


Lymphocytes %     %


 


Monocytes %     %


 


Eosinophils %     %


 


Basophils %     %


 


Neutrophils #     (1.3-7.7)  k/uL


 


Lymphocytes #     (1.0-4.8)  k/uL


 


Monocytes #     (0-1.0)  k/uL


 


Eosinophils #     (0-0.7)  k/uL


 


Basophils #     (0-0.2)  k/uL


 


PT  10.3    (9.0-12.0)  sec


 


INR  1.0    (<1.2)  


 


APTT  22.2    (22.0-30.0)  sec


 


Sodium     (137-145)  mmol/L


 


Potassium     (3.5-5.1)  mmol/L


 


Chloride     ()  mmol/L


 


Carbon Dioxide     (22-30)  mmol/L


 


Anion Gap     mmol/L


 


BUN     (7-17)  mg/dL


 


Creatinine     (0.52-1.04)  mg/dL


 


Est GFR (CKD-EPI)AfAm     (>60 ml/min/1.73 sqM)  


 


Est GFR (CKD-EPI)NonAf     (>60 ml/min/1.73 sqM)  


 


Glucose     (74-99)  mg/dL


 


Calcium     (8.4-10.2)  mg/dL


 


Magnesium     (1.6-2.3)  mg/dL


 


Total Bilirubin     (0.2-1.3)  mg/dL


 


AST     (14-36)  U/L


 


ALT     (9-52)  U/L


 


Alkaline Phosphatase     ()  U/L


 


Troponin I   <0.012   (0.000-0.034)  ng/mL


 


NT-Pro-B Natriuret Pep     pg/mL


 


Total Protein     (6.3-8.2)  g/dL


 


Albumin     (3.5-5.0)  g/dL


 


Urine Color    Yellow  


 


Urine Appearance    Cloudy H  (Clear)  


 


Urine pH    7.5  (5.0-8.0)  


 


Ur Specific Gravity    1.010  (1.001-1.035)  


 


Urine Protein    Trace H  (Negative)  


 


Urine Glucose (UA)    Negative  (Negative)  


 


Urine Ketones    Negative  (Negative)  


 


Urine Blood    Moderate H  (Negative)  


 


Urine Nitrite    Positive H  (Negative)  


 


Urine Bilirubin    Negative  (Negative)  


 


Urine Urobilinogen    <2.0  (<2.0)  mg/dL


 


Ur Leukocyte Esterase    Large H  (Negative)  


 


Urine RBC    9 H  (0-5)  /hpf


 


Urine WBC    >182 H  (0-5)  /hpf


 


Urine WBC Clumps    Many H  (None)  /hpf


 


Ur Squamous Epith Cells    1  (0-4)  /hpf


 


Urine Bacteria    Rare H  (None)  /hpf














Disposition


Clinical Impression: 


 Urinary tract infection, Fall, Head injury





Disposition: HOME SELF-CARE


Condition: Good


Instructions (If sedation given, give patient instructions):  Urinary Tract 

Infection in Women (ED), Head Injury (ED)


Additional Instructions: 


Please take antibiotic as directed.  Please follow-up with primary care in 1-2 

days.  If you're having any worsening symptoms such as fever or concern for 

additional falls return here to the emergency department.


Prescriptions: 


Cephalexin [Keflex] 500 mg PO Q6HR 7 Days  cap


Is patient prescribed a controlled substance at d/c from ED?: No


Referrals: 


Poli Pate MD [Primary Care Provider] - 1-2 days


Time of Disposition: 12:39

## 2019-06-05 NOTE — XR
EXAMINATION TYPE: XR chest 2V

 

DATE OF EXAM: 6/5/2019

 

COMPARISON: Chest x-ray September 21, 2014

 

HISTORY: History of COPD with weakness.

 

TECHNIQUE:  Frontal and lateral views of the chest are obtained.

 

FINDINGS:  There is chronic emphysematous change without suspicious focal air space opacity, pleural 
effusion, or pneumothorax seen.  The cardiac silhouette size remains enlarged with atherosclerotic ch
rodrigo in the aortic knob. Multilevel spurring in the thoracic spine is present. High riding humeral he
ads suggest chronic rotator cuff tears bilaterally

 

IMPRESSION:  Cardiomegaly and chronic parenchymal change without acute pulmonary process.

## 2019-08-26 ENCOUNTER — HOSPITAL ENCOUNTER (OUTPATIENT)
Dept: HOSPITAL 47 - LABWHC1 | Age: 79
Discharge: HOME | End: 2019-08-26
Payer: MEDICARE

## 2019-08-26 DIAGNOSIS — Z79.899: ICD-10-CM

## 2019-08-26 DIAGNOSIS — E11.9: ICD-10-CM

## 2019-08-26 DIAGNOSIS — I10: Primary | ICD-10-CM

## 2019-08-26 LAB
ANION GAP SERPL CALC-SCNC: 9.9 MMOL/L (ref 4–12)
BUN SERPL-SCNC: 44 MG/DL (ref 9–27)
CHLORIDE SERPL-SCNC: 104 MMOL/L (ref 96–109)
CO2 SERPL-SCNC: 32.1 MMOL/L (ref 21.6–31.8)
MAGNESIUM SPEC-SCNC: 2.4 MG/DL (ref 1.5–2.4)
POTASSIUM SERPL-SCNC: 4.3 MMOL/L (ref 3.5–5.5)
SODIUM SERPL-SCNC: 146 MMOL/L (ref 135–145)

## 2019-08-26 PROCEDURE — 82607 VITAMIN B-12: CPT

## 2019-08-26 PROCEDURE — 82565 ASSAY OF CREATININE: CPT

## 2019-08-26 PROCEDURE — 36415 COLL VENOUS BLD VENIPUNCTURE: CPT

## 2019-08-26 PROCEDURE — 80051 ELECTROLYTE PANEL: CPT

## 2019-08-26 PROCEDURE — 83735 ASSAY OF MAGNESIUM: CPT

## 2019-08-26 PROCEDURE — 84520 ASSAY OF UREA NITROGEN: CPT

## 2019-09-24 ENCOUNTER — HOSPITAL ENCOUNTER (INPATIENT)
Dept: HOSPITAL 47 - 4SSUR | Age: 79
LOS: 2 days | Discharge: HOME | DRG: 947 | End: 2019-09-26
Payer: MEDICARE

## 2019-09-24 VITALS — BODY MASS INDEX: 73 KG/M2

## 2019-09-24 DIAGNOSIS — E11.42: ICD-10-CM

## 2019-09-24 DIAGNOSIS — G93.41: ICD-10-CM

## 2019-09-24 DIAGNOSIS — Z79.899: ICD-10-CM

## 2019-09-24 DIAGNOSIS — Z87.891: ICD-10-CM

## 2019-09-24 DIAGNOSIS — M47.26: ICD-10-CM

## 2019-09-24 DIAGNOSIS — I89.0: ICD-10-CM

## 2019-09-24 DIAGNOSIS — I11.0: ICD-10-CM

## 2019-09-24 DIAGNOSIS — I50.30: ICD-10-CM

## 2019-09-24 DIAGNOSIS — I08.3: ICD-10-CM

## 2019-09-24 DIAGNOSIS — Z90.710: ICD-10-CM

## 2019-09-24 DIAGNOSIS — M48.061: ICD-10-CM

## 2019-09-24 DIAGNOSIS — E11.65: ICD-10-CM

## 2019-09-24 DIAGNOSIS — Z72.3: ICD-10-CM

## 2019-09-24 DIAGNOSIS — I27.20: ICD-10-CM

## 2019-09-24 DIAGNOSIS — Z79.82: ICD-10-CM

## 2019-09-24 DIAGNOSIS — Z86.73: ICD-10-CM

## 2019-09-24 DIAGNOSIS — R60.0: Primary | ICD-10-CM

## 2019-09-24 DIAGNOSIS — T42.6X5A: ICD-10-CM

## 2019-09-24 DIAGNOSIS — E78.5: ICD-10-CM

## 2019-09-24 DIAGNOSIS — I10: ICD-10-CM

## 2019-09-24 DIAGNOSIS — Z79.02: ICD-10-CM

## 2019-09-24 DIAGNOSIS — R26.9: ICD-10-CM

## 2019-09-24 DIAGNOSIS — M51.16: ICD-10-CM

## 2019-09-24 LAB
ANION GAP SERPL CALC-SCNC: 7 MMOL/L
BASOPHILS # BLD AUTO: 0 K/UL (ref 0–0.2)
BASOPHILS NFR BLD AUTO: 0 %
BUN SERPL-SCNC: 36 MG/DL (ref 7–17)
CALCIUM SPEC-MCNC: 9.1 MG/DL (ref 8.4–10.2)
CHLORIDE SERPL-SCNC: 102 MMOL/L (ref 98–107)
CO2 SERPL-SCNC: 34 MMOL/L (ref 22–30)
EOSINOPHIL # BLD AUTO: 0.1 K/UL (ref 0–0.7)
EOSINOPHIL NFR BLD AUTO: 1 %
ERYTHROCYTE [DISTWIDTH] IN BLOOD BY AUTOMATED COUNT: 3.87 M/UL (ref 3.8–5.4)
ERYTHROCYTE [DISTWIDTH] IN BLOOD: 13.1 % (ref 11.5–15.5)
GLUCOSE BLD-MCNC: 184 MG/DL (ref 75–99)
GLUCOSE BLD-MCNC: 201 MG/DL (ref 75–99)
GLUCOSE SERPL-MCNC: 168 MG/DL (ref 74–99)
HCT VFR BLD AUTO: 36.1 % (ref 34–46)
HGB BLD-MCNC: 11.9 GM/DL (ref 11.4–16)
LYMPHOCYTES # SPEC AUTO: 1.2 K/UL (ref 1–4.8)
LYMPHOCYTES NFR SPEC AUTO: 13 %
MCH RBC QN AUTO: 30.7 PG (ref 25–35)
MCHC RBC AUTO-ENTMCNC: 32.9 G/DL (ref 31–37)
MCV RBC AUTO: 93.3 FL (ref 80–100)
MONOCYTES # BLD AUTO: 0.4 K/UL (ref 0–1)
MONOCYTES NFR BLD AUTO: 5 %
NEUTROPHILS # BLD AUTO: 7.1 K/UL (ref 1.3–7.7)
NEUTROPHILS NFR BLD AUTO: 80 %
PH UR: 7.5 [PH] (ref 5–8)
PLATELET # BLD AUTO: 193 K/UL (ref 150–450)
POTASSIUM SERPL-SCNC: 3.4 MMOL/L (ref 3.5–5.1)
RBC UR QL: <1 /HPF (ref 0–5)
SODIUM SERPL-SCNC: 143 MMOL/L (ref 137–145)
SP GR UR: 1.01 (ref 1–1.03)
SQUAMOUS UR QL AUTO: 1 /HPF (ref 0–4)
UROBILINOGEN UR QL STRIP: <2 MG/DL (ref ?–2)
WBC # BLD AUTO: 8.8 K/UL (ref 3.8–10.6)
WBC #/AREA URNS HPF: 6 /HPF (ref 0–5)

## 2019-09-24 PROCEDURE — 80048 BASIC METABOLIC PNL TOTAL CA: CPT

## 2019-09-24 PROCEDURE — 71046 X-RAY EXAM CHEST 2 VIEWS: CPT

## 2019-09-24 PROCEDURE — 86038 ANTINUCLEAR ANTIBODIES: CPT

## 2019-09-24 PROCEDURE — 81001 URINALYSIS AUTO W/SCOPE: CPT

## 2019-09-24 PROCEDURE — 82607 VITAMIN B-12: CPT

## 2019-09-24 PROCEDURE — 84443 ASSAY THYROID STIM HORMONE: CPT

## 2019-09-24 PROCEDURE — 87077 CULTURE AEROBIC IDENTIFY: CPT

## 2019-09-24 PROCEDURE — 84484 ASSAY OF TROPONIN QUANT: CPT

## 2019-09-24 PROCEDURE — 84165 PROTEIN E-PHORESIS SERUM: CPT

## 2019-09-24 PROCEDURE — 86780 TREPONEMA PALLIDUM: CPT

## 2019-09-24 PROCEDURE — 83880 ASSAY OF NATRIURETIC PEPTIDE: CPT

## 2019-09-24 PROCEDURE — 93005 ELECTROCARDIOGRAM TRACING: CPT

## 2019-09-24 PROCEDURE — 87186 SC STD MICRODIL/AGAR DIL: CPT

## 2019-09-24 PROCEDURE — 93970 EXTREMITY STUDY: CPT

## 2019-09-24 PROCEDURE — 83036 HEMOGLOBIN GLYCOSYLATED A1C: CPT

## 2019-09-24 PROCEDURE — 87086 URINE CULTURE/COLONY COUNT: CPT

## 2019-09-24 PROCEDURE — 85025 COMPLETE CBC W/AUTO DIFF WBC: CPT

## 2019-09-24 PROCEDURE — 93306 TTE W/DOPPLER COMPLETE: CPT

## 2019-09-24 PROCEDURE — 72131 CT LUMBAR SPINE W/O DYE: CPT

## 2019-09-24 PROCEDURE — 86431 RHEUMATOID FACTOR QUANT: CPT

## 2019-09-24 RX ADMIN — INSULIN ASPART SCH UNIT: 100 INJECTION, SOLUTION INTRAVENOUS; SUBCUTANEOUS at 23:17

## 2019-09-24 RX ADMIN — ATORVASTATIN CALCIUM SCH MG: 40 TABLET, FILM COATED ORAL at 21:47

## 2019-09-24 RX ADMIN — FUROSEMIDE SCH MG: 10 INJECTION, SOLUTION INTRAMUSCULAR; INTRAVENOUS at 22:12

## 2019-09-24 RX ADMIN — GABAPENTIN SCH MG: 400 CAPSULE ORAL at 21:46

## 2019-09-24 RX ADMIN — Medication SCH MG: at 21:52

## 2019-09-24 NOTE — XR
EXAMINATION: XR chest 2V

DATE AND TIME:  9/24/2019 6:09 PM

 

CLINICAL INDICATION: PHH; SOB

 

TECHNIQUE: Departmental protocol

 

COMPARISON: 6/5/2019

 

FINDINGS: EKG leads. 

 

The lungs are clear. 

 

The pleural spaces are negative.

 

The moderately-enlarged cardiac silhouette is redemonstrated.

 

The skeletal structures and soft tissues are negative for acute findings.

 

IMPRESSION: NO DEFINITE ACUTE PROCESS.

## 2019-09-25 LAB
GLUCOSE BLD-MCNC: 101 MG/DL (ref 75–99)
GLUCOSE BLD-MCNC: 210 MG/DL (ref 75–99)
GLUCOSE BLD-MCNC: 249 MG/DL (ref 75–99)
GLUCOSE BLD-MCNC: 289 MG/DL (ref 75–99)
GLUCOSE BLD-MCNC: 297 MG/DL (ref 75–99)

## 2019-09-25 RX ADMIN — INSULIN ASPART SCH UNIT: 100 INJECTION, SOLUTION INTRAVENOUS; SUBCUTANEOUS at 22:13

## 2019-09-25 RX ADMIN — INSULIN ASPART SCH UNIT: 100 INJECTION, SOLUTION INTRAVENOUS; SUBCUTANEOUS at 18:15

## 2019-09-25 RX ADMIN — GABAPENTIN SCH MG: 400 CAPSULE ORAL at 18:15

## 2019-09-25 RX ADMIN — Medication SCH MG: at 07:55

## 2019-09-25 RX ADMIN — CARVEDILOL SCH MG: 12.5 TABLET, FILM COATED ORAL at 07:54

## 2019-09-25 RX ADMIN — GABAPENTIN SCH MG: 400 CAPSULE ORAL at 22:13

## 2019-09-25 RX ADMIN — ASPIRIN 81 MG CHEWABLE TABLET SCH MG: 81 TABLET CHEWABLE at 07:54

## 2019-09-25 RX ADMIN — FUROSEMIDE SCH MG: 10 INJECTION, SOLUTION INTRAMUSCULAR; INTRAVENOUS at 22:13

## 2019-09-25 RX ADMIN — THERA TABS SCH EACH: TAB at 07:54

## 2019-09-25 RX ADMIN — REPAGLINIDE SCH MG: 1 TABLET ORAL at 07:54

## 2019-09-25 RX ADMIN — CARVEDILOL SCH MG: 12.5 TABLET, FILM COATED ORAL at 18:15

## 2019-09-25 RX ADMIN — ATORVASTATIN CALCIUM SCH MG: 40 TABLET, FILM COATED ORAL at 22:13

## 2019-09-25 RX ADMIN — INSULIN ASPART SCH: 100 INJECTION, SOLUTION INTRAVENOUS; SUBCUTANEOUS at 07:06

## 2019-09-25 RX ADMIN — GABAPENTIN SCH MG: 400 CAPSULE ORAL at 07:54

## 2019-09-25 RX ADMIN — CHOLECALCIFEROL TAB 10 MCG (400 UNIT) SCH UNIT: 10 TAB at 07:54

## 2019-09-25 RX ADMIN — REPAGLINIDE SCH MG: 1 TABLET ORAL at 18:25

## 2019-09-25 RX ADMIN — FUROSEMIDE SCH MG: 10 INJECTION, SOLUTION INTRAMUSCULAR; INTRAVENOUS at 07:55

## 2019-09-25 RX ADMIN — INSULIN ASPART SCH UNIT: 100 INJECTION, SOLUTION INTRAVENOUS; SUBCUTANEOUS at 12:59

## 2019-09-25 RX ADMIN — Medication SCH MG: at 22:13

## 2019-09-25 NOTE — P.CRDCN
History of Present Illness


History of present illness: 


This is a pleasant 78-year-old  female past medical history significant

for hypertension, dyslipidemia, diabetes mellitus, CVA and former nicotine 

dependence.  She denies history of coronary artery disease and does not follow 

with a cardiologist for any reason.  We have been asked to see her in 

consultation secondary to lower extremity edema and bradycardia.  The patient 

presented to the hospital with symptoms of bilateral lower extremity discomfort 

that has been ongoing for the previous 3 months.  She states the pain radiates 

from her buttocks and lower back down both legs.  Approximately one week ago she

noticed lower extremity edema.  She denies symptoms of shortness of breath, 

exertional dyspnea, chest pain, palpitations or dizziness.  EKG obtained on 

admission revealed sinus bradycardia heart rate of 49, repeat today again showed

sinus bradycardia heart rate in the 50s.  Chest x-ray is negative for an acute 

cardiopulmonary process.  Lower extremity Doppler is negative for DVT.  

Echocardiogram reveals preserved LV systolic function with ejection fraction 

55-60%, mild mitral regurgitation, moderate aortic sclerosis, mild aortic 

regurgitation, mild tricuspid regurgitation and mild pulmonary hypertension with

an RVSP of 36 mmHg.  Lumbar spine CT reveals spinal stenosis at L4 and L5, 

market facet atrophy, degenerative disc disease, multilevel foraminal encroac

hment.  Laboratory data reviewed, CBC unremarkable, sodium 143, potassium 3.4, 

creatinine 1.45 with a GFR 35, cardiac enzymes negative 1, proBNP 685 and TSH 

0.531.  Current cardiac medications include aspirin 81 mg daily, Plavix 75 mg 

daily secondary to CVA, carvedilol 12.5 mg twice a day, Lasix 40 mg in the 

morning and 20 mg at bedtime and simvastatin 80 mg daily.





At the time of my exam:


CONSTITUTIONAL: Denies fever. Denies chills.


EYES: Denies blurred vision. Denies vision changes. Denies eye pain.


EARS, NOSE, MOUTH & THROAT: Denies headache. Denies sore throat. Denies ear 

pain.


CARDIOVASCULAR: Denies chest pain. Denies shortness of breath. Denies orthopnea.

Denies PND. Denies palpitations.


RESPIRATORY: Denies cough. 


GASTROINTESTINAL: Denies abdominal pain. Denies diarrhea. Denies constipation. 

Denies nausea. Denies vomiting.


MUSCULOSKELETAL: Complains of bilateral lower extremity pain radiating from the 

buttock and lower back. 


INTEGUMENTARY: Denies pruitis. Denies rash.


NEUROLOGIC: Denies numbness. Denies tingling. Denies weakness.


PSYCHIATRIC: Denies anxiety. Denies depression.


ENDOCRINE: Denies fatigue. Denies weight change. Denies polydipsia. Denies 

polyurina.


GENITOURINARY: Denies burning, hematuria or urgency with micturation.


HEMATOLOGIC: Denies history of anemia. Denies bleeding. 





Blood pressure 131/65 heart rate 52 afebrile maintaining oxygen saturation on 

room air


GENERAL: This is a 78-year-old occasion female in no apparent distress at the 

time of my examination.


HEENT: Head is atraumatic, normocephalic. Pupils are equal, round. Sclerae 

anicteric. Conjunctivae are clear. Mucous membranes of the mouth are moist. Neck

is supple. There is no jugular venous distention. No carotid bruit is heard.


LUNGS: Clear to auscultation no wheezes, rales or rhonchi. No chest wall 

tenderness is noted on palpation or with deep breathing.


HEART: Regular rate and rhythm with systolic ejection murmur at the left sternal

border, no rubs or gallops. S1 and S2 heard.


ABDOMEN: Soft, nontender. Bowel sounds are heard. No organomegaly noted.


EXTREMITIES: Trace bilateral lower extremity nonpitting edema and no calf 

tenderness noted.


VASCULAR: Radial and dorsalis pedis pulses palpated, no evidence of clubbing.  


NEUROLOGIC: Patient is awake, alert and oriented x3.


 


ASSESSMENT


Bilateral lower extremity pain radiating from the lower back suggestive of 

possible radiculopathy


Sinus bradycardia, asymptomatic


Hypertension


Dyslipidemia


Diabetes mellitus


History of CVA


Former nicotine dependence, quit 6 years ago





PLAN


Clinically the patient is euvolemic with no evidence to suggest heart failure, 

systolic or diastolic.  Lower extremity edema of unclear etiology however not 

related to heart failure.  Likely secondary to becoming more sedentary recently 

due to the lower extremity pain.  Recommend ADAIR hose.  Discontinuation of Plavix

considering her CVA was/years ago.


Related to bradycardia this is a sinus bradycardia that is asymptomatic.  The 

patient is currently on beta blocker secondary to hypertension and if she does 

become symptomatic they should be weaned appropriately.


Ongoing medical management and evaluation per neurology service and primary care

team.


We will continue to follow as needed.


Thank you kindly for this consultation.





Nurse Practitioner note has been reviewed, I agree with a documented findings 

and plan of care.  Patient was seen and examined.








Past Medical History


Past Medical History: Diabetes Mellitus, Hyperlipidemia, Hypertension, Memory 

Impairment


History of Any Multi-Drug Resistant Organisms: None Reported


Past Surgical History: Hysterectomy, Tonsillectomy


Additional Past Surgical History / Comment(s): right leg vein stripping


Past Anesthesia/Blood Transfusion Reactions: No Reported Reaction


Past Psychological History: No Psychological Hx Reported


Smoking Status: Former smoker


Past Alcohol Use History: None Reported


Past Drug Use History: None Reported





- Past Family History


  ** Father


Family Medical History: No Reported History





  ** Mother


Family Medical History: No Reported History





Medications and Allergies


                                Home Medications











 Medication  Instructions  Recorded  Confirmed  Type


 


Aspirin 81 mg PO DAILY 09/22/14 09/24/19 History


 


Furosemide [Lasix] 40 mg PO DAILY 09/22/14 09/24/19 History


 


Simvastatin [Zocor] 80 mg PO HS 09/22/14 09/24/19 History


 


Magnesium 200 mg PO BID 12/30/18 09/24/19 History


 


Multivitamins, Thera [Multivitamin 1 tab PO DAILY 12/30/18 09/24/19 History





(formulary)]    


 


Repaglinide 0.5 mg PO BID-W/MEALS 12/30/18 09/24/19 History


 


predniSONE 10 mg PO DAILY 12/30/18 09/24/19 History


 


Carvedilol [Coreg] 12.5 mg PO BID 06/05/19 09/24/19 History


 


Clopidogrel [Plavix] 75 mg PO DAILY 06/05/19 09/24/19 History


 


Cholecalciferol [Vitamin D3] 400 unit PO DAILY 09/24/19 09/24/19 History


 


Furosemide [Lasix] 20 mg PO HS 09/24/19 09/24/19 History








                                    Allergies











Allergy/AdvReac Type Severity Reaction Status Date / Time


 


No Known Allergies Allergy   Verified 09/24/19 19:35














Physical Exam


Vitals: 


                                   Vital Signs











  Temp Pulse Resp BP Pulse Ox


 


 09/25/19 07:00  97.7 F  52 L  16  131/65  91 L


 


 09/25/19 01:42  98.0 F  50 L  18  118/63  94 L


 


 09/24/19 21:20  97.8 F  54 L  14  104/54  92 L








                                Intake and Output











 09/24/19 09/25/19 09/25/19





 22:59 06:59 14:59


 


Output Total 400  


 


Balance -400  


 


Output:   


 


  Urine 400  


 


Other:   


 


  Voiding Method Toilet  Toilet


 


  Weight 82.1 kg  














Results





                                 09/24/19 17:57





                                 09/24/19 17:57


                                 Cardiac Enzymes











  09/24/19 Range/Units





  17:57 


 


Troponin I  <0.012  (0.000-0.034)  ng/mL








                                       CBC











  09/24/19 Range/Units





  17:57 


 


WBC  8.8  (3.8-10.6)  k/uL


 


RBC  3.87  (3.80-5.40)  m/uL


 


Hgb  11.9  (11.4-16.0)  gm/dL


 


Hct  36.1  (34.0-46.0)  %


 


Plt Count  193  (150-450)  k/uL








                          Comprehensive Metabolic Panel











  09/24/19 Range/Units





  17:57 


 


Sodium  143  (137-145)  mmol/L


 


Potassium  3.4 L  (3.5-5.1)  mmol/L


 


Chloride  102  ()  mmol/L


 


Carbon Dioxide  34 H  (22-30)  mmol/L


 


BUN  36 H  (7-17)  mg/dL


 


Creatinine  1.45 H  (0.52-1.04)  mg/dL


 


Glucose  168 H  (74-99)  mg/dL


 


Calcium  9.1  (8.4-10.2)  mg/dL








                               Current Medications











Generic Name Dose Route Start Last Admin





  Trade Name Freq  PRN Reason Stop Dose Admin


 


Aspirin  81 mg  09/25/19 09:00  09/25/19 07:54





  Aspirin  PO   81 mg





  DAILY ALYSE   Administration


 


Atorvastatin Calcium  40 mg  09/24/19 21:30  09/24/19 21:47





  Lipitor  PO   40 mg





  HS ALYSE   Administration


 


Carvedilol  12.5 mg  09/25/19 07:30  09/25/19 07:54





  Coreg  PO   12.5 mg





  BID-W/MEALS ALYSE   Administration


 


Cholecalciferol  400 unit  09/25/19 09:00  09/25/19 07:54





  Vitamin D3  PO   400 unit





  DAILY ALYSE   Administration


 


Clopidogrel Bisulfate  75 mg  09/25/19 09:00  09/25/19 07:54





  Plavix  PO   75 mg





  DAILY ALYSE   Administration


 


Furosemide  20 mg  09/24/19 21:00  09/25/19 07:55





  Lasix  IV   20 mg





  Q12HR ALYSE   Administration


 


Gabapentin  800 mg  09/24/19 22:00  09/25/19 07:54





  Neurontin  PO   800 mg





  TID ALYSE   Administration


 


Insulin Aspart  0 unit  09/24/19 23:15  09/25/19 07:06





  Novolog  SQ   Not Given





  [facility] ALYSE  





  Protocol  


 


Magnesium Oxide  200 mg  09/24/19 21:30  09/25/19 07:55





  Mag-Ox  PO   200 mg





  BID ALYSE   Administration


 


Multivitamins  1 each  09/25/19 09:00  09/25/19 07:54





  Theragran  PO   1 each





  DAILY ALYSE   Administration


 


Prednisone  10 mg  09/25/19 09:00  09/25/19 07:54





  PO   10 mg





  DAILY ALYSE   Administration


 


Repaglinide  0.5 mg  09/25/19 07:30  09/25/19 07:54





  Prandin  PO   0.5 mg





  BID-W/MEALS ALYSE   Administration








                                Intake and Output











 09/24/19 09/25/19 09/25/19





 22:59 06:59 14:59


 


Output Total 400  


 


Balance -400  


 


Output:   


 


  Urine 400  


 


Other:   


 


  Voiding Method Toilet  Toilet


 


  Weight 82.1 kg  








                                        





                                 09/24/19 17:57 





                                 09/24/19 17:57

## 2019-09-25 NOTE — CT
EXAMINATION TYPE: CT lumbar spine wo con

 

DATE OF EXAM: 9/25/2019

 

COMPARISON: CT abdomen 12/30/2018

 

HISTORY: neuropathy

 

CT DLP: 1348 mGycm

Automated exposure control for dose reduction was used.

 

An unenhanced CT of the lumbar spine was performed.  Bone and soft tissue window settings are submitt
ed as well as coronal and sagittal reconstructions. 

 

FINDINGS:

Alignment is stable, patient had listhesis grade 1 L4-5. Loss of disc height at the intervertebral le
vels present especially at L4-5 with associated multilevel spondylosis compatible degenerative disc d
isease. There is calcification along the disc at L5-S1, L2-3, L1-2 as on prior. Lumbar vertebral bodi
es show preserved height and bone mineralization.

 

L1-L2: Posterior broad-based disc bulge causes mild anterior mass effect on the thecal sac. There is 
facet arthropathy change. No significant central stenosis or foraminal encroachment.

 

L2-L3: Circumferential posterior disc bulge contacts anterior thecal sac. There is facet arthropathy 
change. No significant central stenosis or foraminal encroachment.

 

L3-L4: Circumferential posterior disc bulge causes mild anterior mass effect on the thecal sac. Facet
 arthropathy with hypertrophy ligamentum flavum causes some posterior lateral mass effect on the thec
al sac. There is moderate central stenosis. Circumferential extension endplate disc complex results i
n some bilateral foraminal encroachment.

 

L4-L5: Marked central stenosis is present due to patient's hypertrophic facet arthropathy change, the
 listhesis contributes to cause a trefoil appearance of the thecal sac, circumferential extension of 
disc and the listhesis contributes to bilateral foraminal encroachment.

 

L5-S1: Posterior extension of endplate disc complex is present causing anterior mass effect on the th
ecal sac somewhat eccentric towards the left, hypertrophic facet change encroaches somewhat on the la
teral recesses, circumferential extension of endplate disc complex results in bilateral foraminal enc
roachment. No significant central stenosis.

 

IMPRESSION:

Spinal stenosis greatest at L4-5 similar to prior exam. Marked facet arthropathy, degenerative disc d
isease, multilevel foraminal encroachment, additional findings above.

## 2019-09-25 NOTE — P.CNNES
History of Present Illness


Consult date: 09/25/19


Requesting physician: Poli Pate


Reason for Consult: BLE numbness


Chief complaint: Legs painful and numb


History of Present Illness: 


This is a 78 RH female h/o peripheral neuropathy attributed to DM. Patient 

states that her BS had been under good control under 100 in general, but lately 

it has gone up to the 200s. Her pain is in the feet but shoots all the way up to

the tailbones. Denies saddle anesthesia or bowel/bladder incontinence. She would

like to be more physically active, but her pain limits her mobility. She walks 

with a cane. Her PCP noted significant BLE edema on Lyrica, so switched her to 

gabapentin instead. She has had CT L-spine and venous dopplers of the BLE, and 

neurology was asked to evaluate her BLE numbness. 








Review of Systems


I have performed a 14-point organ ROS with patient; pertinents are as per HPI.








Past Medical History


Past Medical History: Diabetes Mellitus, Hyperlipidemia, Hypertension, Memory 

Impairment


History of Any Multi-Drug Resistant Organisms: None Reported


Past Surgical History: Hysterectomy, Tonsillectomy


Additional Past Surgical History / Comment(s): right leg vein stripping


Past Anesthesia/Blood Transfusion Reactions: No Reported Reaction


Past Psychological History: No Psychological Hx Reported


Smoking Status: Former smoker


Past Alcohol Use History: None Reported


Past Drug Use History: None Reported





- Past Family History


  ** Father


Family Medical History: No Reported History





  ** Mother


Family Medical History: No Reported History





Medications and Allergies


                                Home Medications











 Medication  Instructions  Recorded  Confirmed  Type


 


Aspirin 81 mg PO DAILY 09/22/14 09/24/19 History


 


Furosemide [Lasix] 40 mg PO DAILY 09/22/14 09/24/19 History


 


Simvastatin [Zocor] 80 mg PO HS 09/22/14 09/24/19 History


 


Magnesium 200 mg PO BID 12/30/18 09/24/19 History


 


Multivitamins, Thera [Multivitamin 1 tab PO DAILY 12/30/18 09/24/19 History





(formulary)]    


 


Repaglinide 0.5 mg PO BID-W/MEALS 12/30/18 09/24/19 History


 


predniSONE 10 mg PO DAILY 12/30/18 09/24/19 History


 


Carvedilol [Coreg] 12.5 mg PO BID 06/05/19 09/24/19 History


 


Clopidogrel [Plavix] 75 mg PO DAILY 06/05/19 09/24/19 History


 


Cholecalciferol [Vitamin D3] 400 unit PO DAILY 09/24/19 09/24/19 History


 


Furosemide [Lasix] 20 mg PO HS 09/24/19 09/24/19 History








                                    Allergies











Allergy/AdvReac Type Severity Reaction Status Date / Time


 


No Known Allergies Allergy   Verified 09/24/19 19:35














Physical Examination





- Vital Signs


Vital Signs: 


                                   Vital Signs











  Temp Pulse Resp BP Pulse Ox


 


 09/25/19 07:00  97.7 F  52 L  16  131/65  91 L


 


 09/25/19 01:42  98.0 F  50 L  18  118/63  94 L


 


 09/24/19 21:20  97.8 F  54 L  14  104/54  92 L








                                Intake and Output











 09/24/19 09/25/19 09/25/19





 22:59 06:59 14:59


 


Output Total 400  


 


Balance -400  


 


Output:   


 


  Urine 400  


 


Other:   


 


  Voiding Method Toilet  Toilet


 


  Weight 82.1 kg  











Gen NAD Pleasant and cooperative


HEENT NCAT Sclera without icterus O/P clear


Neck Supple No carotid bruit


Cor RRR no m/r/g


Lungs CTAB


Abd Soft NTND +BS


Ext Warm to touch No edema


Neuro


MS A+Ox4 Normal fluency Able to follow all commands


CN PERRL VFF no APD EOMI no nystagmus or SAMMIE No facial asymmetry Masseter's sy

mmetric Hearing intact to normal voice bilaterally Speech not dysarthric Equal 

elevation of palate Tongue midline Sym shrug and SCM bilaterally


Motor Normal bulk/tone No pronator or tremors Strength 5/5 sym throughout


Sens Diminished to temp up to mid-shin in BLE


Coord No dysmetria on FTN bilaterally She has a sensory ataxia in the BLE on HTS


DTRs 2+/4 sym in BUE 1+/4 in bilateral patella and 0/4 in bilateral achilles 

Toes downgoing bilaterally No clonus at achilles


Gait Wide-based with cane 








Results





- Laboratory Findings


CBC and BMP: 


                                 09/24/19 17:57





                                 09/24/19 17:57


Abnormal Lab Findings: 


                                  Abnormal Labs











  09/24/19 09/24/19 09/24/19





  17:09 17:57 19:40


 


Potassium   3.4 L 


 


Carbon Dioxide   34 H 


 


BUN   36 H 


 


Creatinine   1.45 H 


 


Glucose   168 H 


 


POC Glucose (mg/dL)  184 H  


 


Ur Leukocyte Esterase    Trace H


 


Urine WBC    6 H


 


Amorphous Sediment    Rare H


 


Urine Bacteria    Occasional H














  09/24/19 09/25/19





  22:53 06:53


 


Potassium  


 


Carbon Dioxide  


 


BUN  


 


Creatinine  


 


Glucose  


 


POC Glucose (mg/dL)  201 H  101 H


 


Ur Leukocyte Esterase  


 


Urine WBC  


 


Amorphous Sediment  


 


Urine Bacteria  














- Diagnostic Findings


Additional findings: 


CT L-spine wo [place] 09/25/19.  Spinal stenosis greatest at L4 to L5, stable.  Mar

ket facet arthropathy, degenerative disc disease, multilevel foraminal 

encroachment at L3-L4, L4-L5 and L5-S1.





I have reviewed neuroimages myself.








Assessment and Plan


Assessment: 


BLE numbness and pain- she likely does have a peripheral neuropathy, but her BLE

 sensory symptoms may also be confounded by lumbar radiculopathies and stenosis.

 The most common etiology of PN is DM, will send additional PN labs as well





Plan: 


-TSH normal. Send B12, TPPA, SPEP, ZOILA, RF


-Discussed different neuropathic pain meds. PCP has switched her Lyrica to 

gabapentin, which can also cause leg swelling. TCAs are used but would need to 

be careful in the elderly. Oxcarbazepine is another option. Would recommend 

outpatient neuro follow-up.


-Would recommend outpatient EMG/NCS to delineate PN and r/o lumbar 

radiculopathy, if not already done (patient does not recall)


-d/w patient in detail. All questions answered.


-No further inpatient neuro recs at this time. Will revisit patient prn. Please 

call with new ?.





Thank you for this consultation.





Time with Patient: Greater than 30 (Time spent in direct patient care, greater 

than 50% of which was spent in face-to-face counseling and coordination of care:

 70 minutes)

## 2019-09-25 NOTE — HP
HISTORY AND PHYSICAL



CHIEF COMPLAINT:

A 78-year-old white female with acute diastolic CHF with shortness of breath with any

exertion, severe leg edema and swelling and some altered mental status changes.  She

cannot feel anything in bilateral legs at this time.  She has severe pain in her legs

and neuropathy all the way from her lower calf bone down to her legs and severe

swelling in her lower extremities.  She is admitted for IV Lasix and for possible

lumbar epidural and neurologic workup for possible altered mental status.



MEDICATIONS:

Please see list.



SURGERY:

See list.



She is a diabetic, COPD, diastolic heart failure, dyslipidemia, 7 disk disease and

neuropathy.



PHYSICAL EXAMINATION:

Temp 97.8, pulse is 50 to 54, blood pressure is 100/50s to 60s, she is 94% on room air.

CARDIOVASCULAR:  S1, S2.

LUNGS:  Show mild wheeze x4.

NEUROLOGIC:  She is giving appropriate answers.  Alert and oriented x2.

GI: Distended, obesity.

HEMATOLOGIC:  Shows 3+ pedal edema bilaterally.  Pulses 1+ dorsalis pedis, posterior

radial pulse.  Positive straight leg raising test.



ASSESSMENT:

Lumbar neuritis, lymphedema type changes, suspect diastolic congestive heart failure.

IV Lasix will be given.  Epidural shot will be given in the lumbar spine.  Lyrica will

be discontinued for possible edema in the legs.  Neurontin will be started PT, OT.

Further orders.  Possibly Cardiology consult for an echo and for bradycardia.





MMODL / IJN: [id number] / Job#: [id number]

## 2019-09-25 NOTE — US
EXAMINATION TYPE: US venous doppler duplex LE 

 

DATE OF EXAM: 9/25/2019 7:56 AM

 

COMPARISON: NONE

 

CLINICAL HISTORY: edema. Bilateral leg pain and edema

 

SIDE PERFORMED: Bilateral  

 

TECHNIQUE:  The lower extremity deep venous system is examined utilizing real time linear array sonog
arlin with graded compression, doppler sonography and color-flow sonography.

 

VESSELS IMAGED:

External Iliac Vein (EIV)

Common Femoral Vein

Deep Femoral Vein

Greater Saphenous Vein *

Femoral Vein

Popliteal Vein

Small Saphenous Vein *

Proximal Calf Veins

(* superficial vessels)

 

There is normal flow, compressibility, vascular waveforms. 

 

Right Leg:  Appears negative for DVT

 

Left Leg:  Appears negative for DVT

 

 

 

IMPRESSION: No evident deep venous arthrosis at or above the knees.

## 2019-09-25 NOTE — ECHOF
Referral Reason:diastolic chf



MEASUREMENTS

--------

HEIGHT: 157.5 cm

WEIGHT: 82.1 kg

BP: 118/63

RVIDd:   2.9 cm     (< 3.3)

IVSd:   1.5 cm     (0.6 - 1.1)

LVIDd:   4.5 cm     (3.9 - 5.3)

LVPWd:   1.2 cm     (0.6 - 1.1)

IVSs:   2.4 cm

LVIDs:   2.9 cm

LVPWs:   1.7 cm

LAESV Index (A-L):   42.89 ml/m

Ao Diam:   2.9 cm     (2.0 - 3.7)

AV Cusp:   2.1 cm     (1.5 - 2.6)

LA Diam:   3.9 cm     (2.7 - 3.8)

EPSS:   0.3 cm

MV E Rishi:   1.28 m/s

MV DecT:   338 ms

MV A Rishi:   1.23 m/s

MV E/A Ratio:   1.04 

AR PHT:   575 ms

RAP:   5.00 mmHg

RVSP:   36.09 mmHg

MV EF SLOPE:   84.56 mm/s     (70 - 150)

MV EXCURSION:   2.02 cm     (> 18.000)







FINDINGS

--------

Sinus rhythm.

This was a technically adequate study.

The left ventricular size is normal.   There is moderate concentric left ventricular hypertrophy.   O
verall left ventricular systolic function is normal with, an EF between 55 - 60 %.

The right ventricle is normal in size.

Left atrium is severely dilated by volume.

The right atrial size is normal.

Interatrial and interventricular septum intact.

There is moderate aortic valve sclerosis.   There is mild aortic regurgitation.

Mild mitral annular calcification present.   Mild mitral regurgitation is present.

Mild tricuspid regurgitation present.   There is mild pulmonary hypertension.   The right ventricular
 systolic pressure, as measured by Doppler, is 36.09mmHg.

There is no pulmonic regurgitation present.

The aortic root size is normal.

The inferior vena cava was not well visualized.

There is no pericardial effusion.



CONCLUSIONS

--------

1. Sinus rhythm.

2. The left ventricular size is normal.

3. There is moderate concentric left ventricular hypertrophy.

4. Overall left ventricular systolic function is normal with, an EF between 55 - 60 %.

5. 

6. Left atrium is severely dilated by volume.

7. There is moderate aortic valve sclerosis.

8. There is mild aortic regurgitation.

9. Mild mitral annular calcification present.

10. Mild mitral regurgitation is present.

11. Mild tricuspid regurgitation present.

12. There is mild pulmonary hypertension.

13. There is no pulmonic regurgitation present.

14. The aortic root size is normal.

15. The inferior vena cava was not well visualized.

16. There is no pericardial effusion.





SONOGRAPHER: Jennifer Cavazos RDCS

## 2019-09-26 VITALS — HEART RATE: 80 BPM | DIASTOLIC BLOOD PRESSURE: 81 MMHG | TEMPERATURE: 97.6 F | SYSTOLIC BLOOD PRESSURE: 136 MMHG

## 2019-09-26 VITALS — RESPIRATION RATE: 15 BRPM

## 2019-09-26 LAB
ALBUMIN SERPL ELPH-MCNC: 3.4 G/DL (ref 3.8–4.9)
GAMMA GLOB SERPL ELPH-MCNC: 0.56 G/DL (ref 0.7–1.5)
GLUCOSE BLD-MCNC: 122 MG/DL (ref 75–99)
GLUCOSE BLD-MCNC: 90 MG/DL (ref 75–99)
HBA1C MFR BLD: 7.7 % (ref 4–6)

## 2019-09-26 RX ADMIN — ASPIRIN 81 MG CHEWABLE TABLET SCH MG: 81 TABLET CHEWABLE at 09:04

## 2019-09-26 RX ADMIN — REPAGLINIDE SCH MG: 1 TABLET ORAL at 09:03

## 2019-09-26 RX ADMIN — FUROSEMIDE SCH MG: 10 INJECTION, SOLUTION INTRAMUSCULAR; INTRAVENOUS at 09:04

## 2019-09-26 RX ADMIN — INSULIN ASPART SCH: 100 INJECTION, SOLUTION INTRAVENOUS; SUBCUTANEOUS at 09:03

## 2019-09-26 RX ADMIN — Medication SCH MG: at 09:04

## 2019-09-26 RX ADMIN — INSULIN ASPART SCH: 100 INJECTION, SOLUTION INTRAVENOUS; SUBCUTANEOUS at 12:24

## 2019-09-26 RX ADMIN — GABAPENTIN SCH MG: 400 CAPSULE ORAL at 09:04

## 2019-09-26 RX ADMIN — CARVEDILOL SCH MG: 12.5 TABLET, FILM COATED ORAL at 09:03

## 2019-09-26 RX ADMIN — THERA TABS SCH EACH: TAB at 09:04

## 2019-09-26 RX ADMIN — CHOLECALCIFEROL TAB 10 MCG (400 UNIT) SCH UNIT: 10 TAB at 09:04

## 2019-09-26 NOTE — P.DS
Providers


Date of admission: 


09/24/19 16:14





Expected date of discharge: 09/26/19


Attending physician: 


Poli Pate





Consults: 





                                        





09/25/19 07:48


Consult Physician Routine 


   Consulting Provider: Adelita Demarco


   Consult Reason/Comments: lumbar epidural


   Do you want consulting provider notified?: Yes





09/25/19 07:56


Consult Physician Routine 


   Consulting Provider: Toro Morales


   Consult Reason/Comments: diastolic chf/bradycardia


   Do you want consulting provider notified?: Yes





09/25/19 07:58


Consult Physician Routine 


   Consulting Provider: Nidia Wells


   Consult Reason/Comments: leg numbness


   Do you want consulting provider notified?: Yes











Primary care physician: 


[facility] Course: 


Final Diagnoses:


-Lower extremity edema-etiology unclear .Acute diastolic CHF ruled out as per 

cardiology, possibly medication induced, sedentary lifestyle.  Lyrica switched 

to gabapentin.  CT spine reporting stable stenosis greatest at L4 to L5, market 

facet arthropathy, degenerative disc disease, multilevel foraminal encroachment 

at L3-L4, L4-L5 and L5-S1. and venous Dopplers of the bilateral lower 

extremities reported negative for DVTs.


-Peripheral neuropathy,  lumbar radiculopathy, lumbar spondylosis, lumbar 

degenerative disc disease


-Sinus bradycardia, asymptomatic


-Acute metabolic encephalopathy, multifactorial, secondary to the above, 

resolved.  Sensorium improved


-History of nicotine dependence


-Diabetes mellitus, uncontrolled, hyperglycemic, now controlled


-Hypertension


-Hyperlipidemia


-History of CVA years ago, Plavix discontinued as per cardiology.


-Gait dysfunction, uses a cane


-Pulmonary hypertension


-Moderate aortic valve sclerosis








This is a 78-year-old female admitted with acute diastolic CHF, and multiple 

other medical issues.  Lyrica discontinued, Neurontin initiated secondary to leg

edema.  Diuresed well on Lasix IV push.  Echo reported normal LV function, EF 

55-60%, moderate concentric left ventricular hypertrophy, severely dilated LA , 

moderate aortic cell sclerosis, mild pulmonary hypertension .Evaluated by pain 

management services, patient is not a candidate for epidural injection at this 

time secondary to being on anticoagulation.  Evaluated by both cardiology and 

neurology.  Neurology workup completed with outpatient EMG/NCS recommended.  

Significant clinical improvement.  Cleared by all consults for discharge.  

Patient is being discharged home in a stable condition with guarded prognosis.





 EXAM:





GENERAL: Alert and oriented 3, no acute distress


CARDIOVASCULAR:  S1, S2 regular.  Positive systolic murmur.


RESPIRATION: Breath sounds diminished in the bases. No rhonchi or crackles. No 

bronchial breathing.


ABDOMEN:  Soft,  nontender . No guarding. no masses palpable. Bowel sounds 

heard.


NERVOUS SYSTEM: No focal deficits. 























The impression and plan of care has been dictated as directed.





:


I performed a history and examination of this patient,  discussed the same with 

the dictator.  I agree with the dictator's note ,documented as a scribe.  Any 

additional findings or plans will be noted.


Patient Condition at Discharge: Stable





Plan - Discharge Summary


Discharge Rx Participant: Yes


New Discharge Prescriptions: 


New


   Gabapentin [Neurontin] 800 mg PO TID #18 cap





Continue


   Furosemide [Lasix] 40 mg PO DAILY


   Simvastatin [Zocor] 80 mg PO HS


   Aspirin 81 mg PO DAILY


   Multivitamins, Thera [Multivitamin (formulary)] 1 tab PO DAILY


   Repaglinide 0.5 mg PO BID-W/MEALS


   Magnesium 200 mg PO BID


   predniSONE 10 mg PO DAILY


   Carvedilol [Coreg] 12.5 mg PO BID


   Cholecalciferol [Vitamin D3] 400 unit PO DAILY


   Furosemide [Lasix] 20 mg PO HS


Discharge Medication List





Aspirin 81 mg PO DAILY 09/22/14 [History]


Furosemide [Lasix] 40 mg PO DAILY 09/22/14 [History]


Simvastatin [Zocor] 80 mg PO HS 09/22/14 [History]


Magnesium 200 mg PO BID 12/30/18 [History]


Multivitamins, Thera [Multivitamin (formulary)] 1 tab PO DAILY 12/30/18 

[History]


Repaglinide 0.5 mg PO BID-W/MEALS 12/30/18 [History]


predniSONE 10 mg PO DAILY 12/30/18 [History]


Carvedilol [Coreg] 12.5 mg PO BID 06/05/19 [History]


Cholecalciferol [Vitamin D3] 400 unit PO DAILY 09/24/19 [History]


Furosemide [Lasix] 20 mg PO HS 09/24/19 [History]


Gabapentin [Neurontin] 800 mg PO TID #18 cap 09/26/19 [Rx]








Follow up Appointment(s)/Referral(s): 


[facility], [NON-STAFF] - 1-2 Days


Vonnie Smith MD [STAFF PHYSICIAN] - 10/10/19 8:45 am


Shayla Panda MD [STAFF PHYSICIAN] - 1 Week


Poli Pate MD [Primary Care Provider] - 10/01/19 1:00 pm


Irma Read MD [STAFF PHYSICIAN] - 1 Week (Pain clinic outpatient to discuss 

options)


Activity/Diet/Wound Care/Special Instructions: 


Confirm cardiology follow-up appointment prior to discharge.  Your insurance 

required you to go through [employer] for your blood sugar testing supplies, 

including the glucometer, they can be reached at [phone]


Discharge Disposition: HOME SELF-CARE

## 2020-03-03 ENCOUNTER — HOSPITAL ENCOUNTER (OUTPATIENT)
Dept: HOSPITAL 47 - LABWHC1 | Age: 80
Discharge: HOME | End: 2020-03-03
Attending: FAMILY MEDICINE
Payer: MEDICARE

## 2020-03-03 DIAGNOSIS — I25.10: Primary | ICD-10-CM

## 2020-03-03 LAB
ALBUMIN SERPL-MCNC: 4.3 G/DL (ref 3.8–4.9)
ALBUMIN/GLOB SERPL: 2.26 G/DL (ref 1.6–3.17)
ALP SERPL-CCNC: 61 U/L (ref 41–126)
ALT SERPL-CCNC: 25 U/L (ref 8–44)
ANION GAP SERPL CALC-SCNC: 11.2 MMOL/L (ref 4–12)
AST SERPL-CCNC: 22 U/L (ref 13–35)
BUN SERPL-SCNC: 39 MG/DL (ref 9–27)
BUN/CREAT SERPL: 24.38 RATIO (ref 12–20)
CALCIUM SPEC-MCNC: 9.4 MG/DL (ref 8.7–10.3)
CHLORIDE SERPL-SCNC: 101 MMOL/L (ref 96–109)
CO2 SERPL-SCNC: 32.8 MMOL/L (ref 21.6–31.8)
ERYTHROCYTE [DISTWIDTH] IN BLOOD BY AUTOMATED COUNT: 4.17 M/UL (ref 3.8–5.4)
ERYTHROCYTE [DISTWIDTH] IN BLOOD: 12.6 % (ref 11.5–15.5)
GLOBULIN SER CALC-MCNC: 1.9 G/DL (ref 1.6–3.3)
GLUCOSE SERPL-MCNC: 268 MG/DL (ref 70–110)
HCT VFR BLD AUTO: 38.8 % (ref 34–46)
HGB BLD-MCNC: 12.6 GM/DL (ref 11.4–16)
MCH RBC QN AUTO: 30.2 PG (ref 25–35)
MCHC RBC AUTO-ENTMCNC: 32.5 G/DL (ref 31–37)
MCV RBC AUTO: 93 FL (ref 80–100)
PLATELET # BLD AUTO: 212 K/UL (ref 150–450)
POTASSIUM SERPL-SCNC: 4.2 MMOL/L (ref 3.5–5.5)
PROT SERPL-MCNC: 6.2 G/DL (ref 6.2–8.2)
SODIUM SERPL-SCNC: 145 MMOL/L (ref 135–145)
WBC # BLD AUTO: 10.6 K/UL (ref 3.8–10.6)

## 2020-03-03 PROCEDURE — 85027 COMPLETE CBC AUTOMATED: CPT

## 2020-03-03 PROCEDURE — 80053 COMPREHEN METABOLIC PANEL: CPT

## 2020-03-03 PROCEDURE — 84443 ASSAY THYROID STIM HORMONE: CPT

## 2020-03-03 PROCEDURE — 36415 COLL VENOUS BLD VENIPUNCTURE: CPT

## 2020-03-03 PROCEDURE — 83880 ASSAY OF NATRIURETIC PEPTIDE: CPT

## 2020-03-03 PROCEDURE — 83036 HEMOGLOBIN GLYCOSYLATED A1C: CPT

## 2020-03-04 LAB — HBA1C MFR BLD: 9.6 % (ref 4–6)

## 2020-06-25 ENCOUNTER — HOSPITAL ENCOUNTER (INPATIENT)
Dept: HOSPITAL 47 - EC | Age: 80
LOS: 5 days | Discharge: HOME | DRG: 552 | End: 2020-06-30
Attending: FAMILY MEDICINE | Admitting: FAMILY MEDICINE
Payer: MEDICARE

## 2020-06-25 DIAGNOSIS — M47.26: ICD-10-CM

## 2020-06-25 DIAGNOSIS — E78.5: ICD-10-CM

## 2020-06-25 DIAGNOSIS — M51.16: Primary | ICD-10-CM

## 2020-06-25 DIAGNOSIS — Z79.84: ICD-10-CM

## 2020-06-25 DIAGNOSIS — I27.20: ICD-10-CM

## 2020-06-25 DIAGNOSIS — E11.65: ICD-10-CM

## 2020-06-25 DIAGNOSIS — Z90.710: ICD-10-CM

## 2020-06-25 DIAGNOSIS — I11.0: ICD-10-CM

## 2020-06-25 DIAGNOSIS — Z11.59: ICD-10-CM

## 2020-06-25 DIAGNOSIS — I50.30: ICD-10-CM

## 2020-06-25 DIAGNOSIS — Z87.891: ICD-10-CM

## 2020-06-25 DIAGNOSIS — Z79.52: ICD-10-CM

## 2020-06-25 DIAGNOSIS — M48.061: ICD-10-CM

## 2020-06-25 DIAGNOSIS — Z86.73: ICD-10-CM

## 2020-06-25 DIAGNOSIS — T38.0X5A: ICD-10-CM

## 2020-06-25 DIAGNOSIS — M43.16: ICD-10-CM

## 2020-06-25 DIAGNOSIS — I35.8: ICD-10-CM

## 2020-06-25 DIAGNOSIS — Z79.899: ICD-10-CM

## 2020-06-25 DIAGNOSIS — E11.40: ICD-10-CM

## 2020-06-25 DIAGNOSIS — J44.9: ICD-10-CM

## 2020-06-25 PROCEDURE — 72148 MRI LUMBAR SPINE W/O DYE: CPT

## 2020-06-25 PROCEDURE — 85025 COMPLETE CBC W/AUTO DIFF WBC: CPT

## 2020-06-25 PROCEDURE — 73502 X-RAY EXAM HIP UNI 2-3 VIEWS: CPT

## 2020-06-25 PROCEDURE — 85610 PROTHROMBIN TIME: CPT

## 2020-06-25 PROCEDURE — 80048 BASIC METABOLIC PNL TOTAL CA: CPT

## 2020-06-25 PROCEDURE — 80053 COMPREHEN METABOLIC PANEL: CPT

## 2020-06-25 PROCEDURE — 99284 EMERGENCY DEPT VISIT MOD MDM: CPT

## 2020-06-25 PROCEDURE — 62323 NJX INTERLAMINAR LMBR/SAC: CPT

## 2020-06-25 RX ADMIN — ACETAMINOPHEN AND CODEINE PHOSPHATE STA EACH: 300; 30 TABLET ORAL at 21:07

## 2020-06-25 NOTE — XR
EXAMINATION TYPE: XR Hip LT and AP Pelvis

 

DATE OF EXAM: 6/25/2020

 

COMPARISON: 6/5/2019

 

HISTORY: Left hip pain. Fall.

 

TECHNIQUE: 3 views

 

FINDINGS: Pelvic ring is intact. Proximal left femur and hip joint are intact. There is no sign of a 
fracture. Sacroiliac joints are intact. There is atherosclerotic vascular calcification. There is mil
d narrowing of the right hip joint space.

 

IMPRESSION: No acute abnormality of the pelvis and left hip. No change.

## 2020-06-25 NOTE — ED
General Adult HPI





- General


Source: patient, family, RN notes reviewed


Mode of arrival: ambulatory


Limitations: no limitations





<Cal Gardner - Last Filed: 06/25/20 21:01>





<Conner Bello - Last Filed: 06/29/20 15:20>





- General


Chief complaint: Extremity Problem,Nontraumatic


Stated complaint: Leg Pain


Time Seen by Provider: 06/25/20 20:02





- History of Present Illness


Initial comments: 





79-year-old female with a past medical history of hyperlipidemia, hypertension, 

diabetes mellitus presents to the emergency department for left hip pain.  

Patient states that she has had left hip pain for the past year.  Patient states

it radiates down to the left foot at times.  States movement seems to worsen 

this pain.  States she has seen her doctor for this and had an injection in her 

back but it did not seem to help.  She denies any numbness or tingling of the 

left leg.  Does state both her feet sometimes feel prickly but this has been an 

ongoing issue for quite some time.  Denies any weakness of the lower extrem

ities.Patient has no other complaints at this time including shortness of 

breath, chest pain, abdominal pain, nausea or vomiting, headache, or visual 

changes. (Cal Gardner)





- Related Data


                                Home Medications











 Medication  Instructions  Recorded  Confirmed


 


Simvastatin [Zocor] 80 mg PO HS 09/22/14 06/25/20


 


predniSONE 10 mg PO DAILY 12/30/18 06/25/20


 


Carvedilol [Coreg] 12.5 mg PO BID 06/05/19 06/25/20


 


Cholecalciferol [Vitamin D3] 400 unit PO DAILY 09/24/19 06/25/20


 


Furosemide [Lasix] 40 mg PO BID 09/24/19 06/25/20


 


Dulaglutide [Trulicity] 1.5 mg SQ TH 06/25/20 06/25/20


 


Gabapentin [Neurontin] 400 mg PO TID 06/25/20 06/25/20


 


Pioglitazone [Actos] 15 mg PO DAILY 06/25/20 06/25/20


 


Repaglinide [Prandin] 1 mg PO AC-TID 06/25/20 06/25/20











                                    Allergies











Allergy/AdvReac Type Severity Reaction Status Date / Time


 


No Known Allergies Allergy   Verified 06/25/20 22:33














Review of Systems


ROS Other: All systems not noted in ROS Statement are negative.





<KendraCal hodges JUSTIN - Last Filed: 06/25/20 21:01>


ROS Other: All systems not noted in ROS Statement are negative.





<Conner Bello - Last Filed: 06/29/20 15:20>


ROS Statement: 


Those systems with pertinent positive or pertinent negative responses have been 

documented in the HPI.








Past Medical History


Past Medical History: Diabetes Mellitus, Hyperlipidemia, Hypertension, Memory 

Impairment, Pneumonia


History of Any Multi-Drug Resistant Organisms: None Reported


Past Surgical History: Hysterectomy, Tonsillectomy


Additional Past Surgical History / Comment(s): right leg vein stripping


Past Anesthesia/Blood Transfusion Reactions: No Reported Reaction


Past Psychological History: No Psychological Hx Reported


Smoking Status: Former smoker


Past Alcohol Use History: None Reported


Past Drug Use History: None Reported





- Past Family History


  ** Father


Family Medical History: No Reported History





  ** Mother


Family Medical History: No Reported History





<KendraCal P - Last Filed: 06/25/20 21:01>





General Exam


Limitations: no limitations


General appearance: alert, in no apparent distress


Head exam: Present: atraumatic, normocephalic, normal inspection


Eye exam: Present: normal appearance, PERRL, EOMI.  Absent: scleral icterus, 

conjunctival injection, periorbital swelling


ENT exam: Present: normal exam, mucous membranes moist


Neck exam: Present: normal inspection, full ROM.  Absent: tenderness, 

meningismus, lymphadenopathy


Respiratory exam: Present: normal lung sounds bilaterally.  Absent: respiratory 

distress, wheezes, rales, rhonchi, stridor


Cardiovascular Exam: Present: regular rate, normal rhythm, normal heart sounds. 

 Absent: systolic murmur, diastolic murmur, rubs, gallop, clicks


GI/Abdominal exam: Present: soft, normal bowel sounds.  Absent: distended, 

tenderness, guarding, rebound, rigid


Extremities exam: Present: normal capillary refill (Capillary refill less than 2

 seconds in the left lower extremity and equal in the right lower extremity.  DP

 and PT signals evident on Doppler.), other (skin exam is normal in the left 

lower extremity).  Absent: full ROM (Patient has left hip flexion to 90 but has

 pain with flexion past this.  Patient has pain with external rotation of the 

left hip as well.), tenderness (No tenderness of the left leg.), pedal edema, 

joint swelling, calf tenderness (no calf tenderness noted.  Negative Homans 

sign.)





<Cal Gardner - Last Filed: 06/25/20 21:01>





Course





<Conner Bello - Last Filed: 06/29/20 15:20>


                                   Vital Signs











  06/25/20 06/25/20 06/25/20





  19:56 21:08 22:52


 


Temperature 98.3 F  98.9 F


 


Pulse Rate 91 72 71


 


Pulse Rate [   





Right]   


 


Respiratory 18 17 18





Rate   


 


Blood Pressure 165/71 147/72 147/61


 


Blood Pressure   





[Left Arm]   


 


O2 Sat by Pulse 97 95 96





Oximetry   














  06/26/20 06/26/20 06/26/20





  01:44 05:17 08:00


 


Temperature  97.1 F L 97.7 F


 


Pulse Rate 69 69 


 


Pulse Rate [   72





Right]   


 


Respiratory 19 15 12





Rate   


 


Blood Pressure 141/82 147/73 


 


Blood Pressure   131/64





[Left Arm]   


 


O2 Sat by Pulse 98 98 





Oximetry   














  06/26/20 06/26/20 06/26/20





  12:00 14:32 14:40


 


Temperature 97.8 F  98 F


 


Pulse Rate   


 


Pulse Rate [ 68  77





Right]   


 


Respiratory 12 10 L 12





Rate   


 


Blood Pressure   


 


Blood Pressure 128/68  133/72





[Left Arm]   


 


O2 Sat by Pulse 96  96





Oximetry   














  06/26/20





  14:43


 


Temperature 


 


Pulse Rate 


 


Pulse Rate [ 72





Right] 


 


Respiratory 14





Rate 


 


Blood Pressure 


 


Blood Pressure 134/55





[Left Arm] 


 


O2 Sat by Pulse 90 L





Oximetry 














- Reevaluation(s)


Reevaluation #1: 





06/25/20 22:21


PA supervision: I did proceed evaluate this case patient presented with 

complaints of leg pain specifically in the left going on for some time.  This 

seems be getting worse no improvement is become difficult to ambulate.  The 

presentation is consistent with sciatica.  Case was discussed with Dr. Pate 

patient will be admitted with pain control/pain management from anesthesia. 

(Conner Bello)





Medical Decision Making





<Cal Gardner - Last Filed: 06/25/20 21:01>





- Lab Data


Result diagrams: 


                                 06/27/20 05:59





                                 06/27/20 05:59





<Conner Bello - Last Filed: 06/29/20 15:20>





- Medical Decision Making





HPI physical exam is documented.  Neurovascular status intact in the left lower 

extremity.  X-ray of the pelvis shows no acute abnormality.  No change.  Given 

the state of this ongoing for a year and is a shooting pain down the left leg I 

think it is best patient follows up with orthopedics.  This could be related to 

bulging disc and she could benefit from MRI and additional evaluation.  She'll 

be given a Tylenol 3.  Patient is ambulatory without difficulty.  She will 

return here for any worsening symptoms. (Cal Gardner)





- Lab Data


                                   Lab Results











  06/25/20 06/25/20 06/26/20 Range/Units





  23:30 23:30 07:03 


 


WBC  9.7    (3.8-10.6)  k/uL


 


RBC  4.23    (3.80-5.40)  m/uL


 


Hgb  12.8    (11.4-16.0)  gm/dL


 


Hct  39.3    (34.0-46.0)  %


 


MCV  92.9    (80.0-100.0)  fL


 


MCH  30.3    (25.0-35.0)  pg


 


MCHC  32.6    (31.0-37.0)  g/dL


 


RDW  13.1    (11.5-15.5)  %


 


Plt Count  192    (150-450)  k/uL


 


Neutrophils %  70    %


 


Lymphocytes %  20    %


 


Monocytes %  7    %


 


Eosinophils %  1    %


 


Basophils %  1    %


 


Neutrophils #  6.8    (1.3-7.7)  k/uL


 


Lymphocytes #  1.9    (1.0-4.8)  k/uL


 


Monocytes #  0.7    (0-1.0)  k/uL


 


Eosinophils #  0.1    (0-0.7)  k/uL


 


Basophils #  0.1    (0-0.2)  k/uL


 


PT     (9.0-12.0)  sec


 


INR     (<1.2)  


 


Sodium   138   (137-145)  mmol/L


 


Potassium   3.8   (3.5-5.1)  mmol/L


 


Chloride   101   ()  mmol/L


 


Carbon Dioxide   30   (22-30)  mmol/L


 


Anion Gap   7   mmol/L


 


BUN   41 H   (7-17)  mg/dL


 


Creatinine   1.34 H   (0.52-1.04)  mg/dL


 


Est GFR (CKD-EPI)AfAm   44   (>60 ml/min/1.73 sqM)  


 


Est GFR (CKD-EPI)NonAf   38   (>60 ml/min/1.73 sqM)  


 


Glucose   72 L   (74-99)  mg/dL


 


POC Glucose (mg/dL)     (75-99)  mg/dL


 


POC Glu Operater ID     


 


Calcium   9.4   (8.4-10.2)  mg/dL


 


Total Bilirubin   0.7   (0.2-1.3)  mg/dL


 


AST   30   (14-36)  U/L


 


ALT   27   (4-34)  U/L


 


Alkaline Phosphatase   53   ()  U/L


 


Total Protein   6.9   (6.3-8.2)  g/dL


 


Albumin   4.1   (3.5-5.0)  g/dL


 


Coronavirus (PCR)    Not Detected  (Not Detected)  














  06/26/20 06/26/20 06/26/20 Range/Units





  11:02 12:32 16:45 


 


WBC     (3.8-10.6)  k/uL


 


RBC     (3.80-5.40)  m/uL


 


Hgb     (11.4-16.0)  gm/dL


 


Hct     (34.0-46.0)  %


 


MCV     (80.0-100.0)  fL


 


MCH     (25.0-35.0)  pg


 


MCHC     (31.0-37.0)  g/dL


 


RDW     (11.5-15.5)  %


 


Plt Count     (150-450)  k/uL


 


Neutrophils %     %


 


Lymphocytes %     %


 


Monocytes %     %


 


Eosinophils %     %


 


Basophils %     %


 


Neutrophils #     (1.3-7.7)  k/uL


 


Lymphocytes #     (1.0-4.8)  k/uL


 


Monocytes #     (0-1.0)  k/uL


 


Eosinophils #     (0-0.7)  k/uL


 


Basophils #     (0-0.2)  k/uL


 


PT  10.6    (9.0-12.0)  sec


 


INR  1.0    (<1.2)  


 


Sodium     (137-145)  mmol/L


 


Potassium     (3.5-5.1)  mmol/L


 


Chloride     ()  mmol/L


 


Carbon Dioxide     (22-30)  mmol/L


 


Anion Gap     mmol/L


 


BUN     (7-17)  mg/dL


 


Creatinine     (0.52-1.04)  mg/dL


 


Est GFR (CKD-EPI)AfAm     (>60 ml/min/1.73 sqM)  


 


Est GFR (CKD-EPI)NonAf     (>60 ml/min/1.73 sqM)  


 


Glucose     (74-99)  mg/dL


 


POC Glucose (mg/dL)   86  118 H  (75-99)  mg/dL


 


POC Glu Operater Martha Yoon Yolanda  


 


Calcium     (8.4-10.2)  mg/dL


 


Total Bilirubin     (0.2-1.3)  mg/dL


 


AST     (14-36)  U/L


 


ALT     (4-34)  U/L


 


Alkaline Phosphatase     ()  U/L


 


Total Protein     (6.3-8.2)  g/dL


 


Albumin     (3.5-5.0)  g/dL


 


Coronavirus (PCR)     (Not Detected)  














  06/26/20 06/27/20 06/27/20 Range/Units





  21:11 05:59 05:59 


 


WBC   12.1 H   (3.8-10.6)  k/uL


 


RBC   4.38   (3.80-5.40)  m/uL


 


Hgb   13.1   (11.4-16.0)  gm/dL


 


Hct   41.8   (34.0-46.0)  %


 


MCV   95.5   (80.0-100.0)  fL


 


MCH   30.0   (25.0-35.0)  pg


 


MCHC   31.4   (31.0-37.0)  g/dL


 


RDW   13.2   (11.5-15.5)  %


 


Plt Count   176   (150-450)  k/uL


 


Neutrophils %   93   %


 


Lymphocytes %   5   %


 


Monocytes %   2   %


 


Eosinophils %   1   %


 


Basophils %   0   %


 


Neutrophils #   11.2 H   (1.3-7.7)  k/uL


 


Lymphocytes #   0.6 L   (1.0-4.8)  k/uL


 


Monocytes #   0.2   (0-1.0)  k/uL


 


Eosinophils #   0.1   (0-0.7)  k/uL


 


Basophils #   0.0   (0-0.2)  k/uL


 


PT     (9.0-12.0)  sec


 


INR     (<1.2)  


 


Sodium    143  (137-145)  mmol/L


 


Potassium    4.6  (3.5-5.1)  mmol/L


 


Chloride    108 H  ()  mmol/L


 


Carbon Dioxide    29  (22-30)  mmol/L


 


Anion Gap    6  mmol/L


 


BUN    32 H  (7-17)  mg/dL


 


Creatinine    1.28 H  (0.52-1.04)  mg/dL


 


Est GFR (CKD-EPI)AfAm    47  (>60 ml/min/1.73 sqM)  


 


Est GFR (CKD-EPI)NonAf    40  (>60 ml/min/1.73 sqM)  


 


Glucose    132 H  (74-99)  mg/dL


 


POC Glucose (mg/dL)  193 H    (75-99)  mg/dL


 


POC Glu Operater ID  Sadaf Booker    


 


Calcium    8.7  (8.4-10.2)  mg/dL


 


Total Bilirubin     (0.2-1.3)  mg/dL


 


AST     (14-36)  U/L


 


ALT     (4-34)  U/L


 


Alkaline Phosphatase     ()  U/L


 


Total Protein     (6.3-8.2)  g/dL


 


Albumin     (3.5-5.0)  g/dL


 


Coronavirus (PCR)     (Not Detected)  














  06/27/20 06/27/20 Range/Units





  06:29 12:05 


 


WBC    (3.8-10.6)  k/uL


 


RBC    (3.80-5.40)  m/uL


 


Hgb    (11.4-16.0)  gm/dL


 


Hct    (34.0-46.0)  %


 


MCV    (80.0-100.0)  fL


 


MCH    (25.0-35.0)  pg


 


MCHC    (31.0-37.0)  g/dL


 


RDW    (11.5-15.5)  %


 


Plt Count    (150-450)  k/uL


 


Neutrophils %    %


 


Lymphocytes %    %


 


Monocytes %    %


 


Eosinophils %    %


 


Basophils %    %


 


Neutrophils #    (1.3-7.7)  k/uL


 


Lymphocytes #    (1.0-4.8)  k/uL


 


Monocytes #    (0-1.0)  k/uL


 


Eosinophils #    (0-0.7)  k/uL


 


Basophils #    (0-0.2)  k/uL


 


PT    (9.0-12.0)  sec


 


INR    (<1.2)  


 


Sodium    (137-145)  mmol/L


 


Potassium    (3.5-5.1)  mmol/L


 


Chloride    ()  mmol/L


 


Carbon Dioxide    (22-30)  mmol/L


 


Anion Gap    mmol/L


 


BUN    (7-17)  mg/dL


 


Creatinine    (0.52-1.04)  mg/dL


 


Est GFR (CKD-EPI)AfAm    (>60 ml/min/1.73 sqM)  


 


Est GFR (CKD-EPI)NonAf    (>60 ml/min/1.73 sqM)  


 


Glucose    (74-99)  mg/dL


 


POC Glucose (mg/dL)  162 H  287 H  (75-99)  mg/dL


 


POC Glu Operater ID  Emily Bookermelany Morgan Rolanda  


 


Calcium    (8.4-10.2)  mg/dL


 


Total Bilirubin    (0.2-1.3)  mg/dL


 


AST    (14-36)  U/L


 


ALT    (4-34)  U/L


 


Alkaline Phosphatase    ()  U/L


 


Total Protein    (6.3-8.2)  g/dL


 


Albumin    (3.5-5.0)  g/dL


 


Coronavirus (PCR)    (Not Detected)  














Disposition


Is patient prescribed a controlled substance at d/c from ED?: No


Time of Disposition: 21:02





<Cal Gardner - Last Filed: 06/25/20 21:01>


Is patient prescribed a controlled substance at d/c from ED?: No





<Conner Bello - Last Filed: 06/29/20 15:20>


Clinical Impression: 


 Leg pain, left





Disposition: HOME SELF-CARE


Condition: Good

## 2020-06-26 LAB
ALBUMIN SERPL-MCNC: 4.1 G/DL (ref 3.5–5)
ALP SERPL-CCNC: 53 U/L (ref 38–126)
ALT SERPL-CCNC: 27 U/L (ref 4–34)
ANION GAP SERPL CALC-SCNC: 7 MMOL/L
AST SERPL-CCNC: 30 U/L (ref 14–36)
BASOPHILS # BLD AUTO: 0.1 K/UL (ref 0–0.2)
BASOPHILS NFR BLD AUTO: 1 %
BUN SERPL-SCNC: 41 MG/DL (ref 7–17)
CALCIUM SPEC-MCNC: 9.4 MG/DL (ref 8.4–10.2)
CHLORIDE SERPL-SCNC: 101 MMOL/L (ref 98–107)
CO2 SERPL-SCNC: 30 MMOL/L (ref 22–30)
EOSINOPHIL # BLD AUTO: 0.1 K/UL (ref 0–0.7)
EOSINOPHIL NFR BLD AUTO: 1 %
ERYTHROCYTE [DISTWIDTH] IN BLOOD BY AUTOMATED COUNT: 4.23 M/UL (ref 3.8–5.4)
ERYTHROCYTE [DISTWIDTH] IN BLOOD: 13.1 % (ref 11.5–15.5)
GLUCOSE BLD-MCNC: 118 MG/DL (ref 75–99)
GLUCOSE BLD-MCNC: 193 MG/DL (ref 75–99)
GLUCOSE BLD-MCNC: 86 MG/DL (ref 75–99)
GLUCOSE SERPL-MCNC: 72 MG/DL (ref 74–99)
HCT VFR BLD AUTO: 39.3 % (ref 34–46)
HGB BLD-MCNC: 12.8 GM/DL (ref 11.4–16)
INR PPP: 1 (ref ?–1.2)
LYMPHOCYTES # SPEC AUTO: 1.9 K/UL (ref 1–4.8)
LYMPHOCYTES NFR SPEC AUTO: 20 %
MCH RBC QN AUTO: 30.3 PG (ref 25–35)
MCHC RBC AUTO-ENTMCNC: 32.6 G/DL (ref 31–37)
MCV RBC AUTO: 92.9 FL (ref 80–100)
MONOCYTES # BLD AUTO: 0.7 K/UL (ref 0–1)
MONOCYTES NFR BLD AUTO: 7 %
NEUTROPHILS # BLD AUTO: 6.8 K/UL (ref 1.3–7.7)
NEUTROPHILS NFR BLD AUTO: 70 %
PLATELET # BLD AUTO: 192 K/UL (ref 150–450)
POTASSIUM SERPL-SCNC: 3.8 MMOL/L (ref 3.5–5.1)
PROT SERPL-MCNC: 6.9 G/DL (ref 6.3–8.2)
PT BLD: 10.6 SEC (ref 9–12)
SODIUM SERPL-SCNC: 138 MMOL/L (ref 137–145)
WBC # BLD AUTO: 9.7 K/UL (ref 3.8–10.6)

## 2020-06-26 RX ADMIN — REPAGLINIDE SCH: 1 TABLET ORAL at 14:17

## 2020-06-26 RX ADMIN — GABAPENTIN SCH: 400 CAPSULE ORAL at 15:06

## 2020-06-26 RX ADMIN — CHOLECALCIFEROL TAB 10 MCG (400 UNIT) SCH UNIT: 10 TAB at 13:43

## 2020-06-26 RX ADMIN — METHYLPREDNISOLONE SODIUM SUCCINATE SCH MG: 40 INJECTION, POWDER, FOR SOLUTION INTRAMUSCULAR; INTRAVENOUS at 15:15

## 2020-06-26 RX ADMIN — ACETAMINOPHEN AND CODEINE PHOSPHATE STA: 300; 30 TABLET ORAL at 01:26

## 2020-06-26 RX ADMIN — INSULIN ASPART SCH: 100 INJECTION, SOLUTION INTRAVENOUS; SUBCUTANEOUS at 16:49

## 2020-06-26 RX ADMIN — INSULIN ASPART SCH: 100 INJECTION, SOLUTION INTRAVENOUS; SUBCUTANEOUS at 12:49

## 2020-06-26 RX ADMIN — INSULIN ASPART SCH UNIT: 100 INJECTION, SOLUTION INTRAVENOUS; SUBCUTANEOUS at 21:24

## 2020-06-26 RX ADMIN — ATORVASTATIN CALCIUM SCH MG: 40 TABLET, FILM COATED ORAL at 21:26

## 2020-06-26 RX ADMIN — PIOGLITAZONE SCH MG: 15 TABLET ORAL at 13:43

## 2020-06-26 RX ADMIN — HYDROMORPHONE HYDROCHLORIDE PRN MG: 1 INJECTION, SOLUTION INTRAMUSCULAR; INTRAVENOUS; SUBCUTANEOUS at 10:25

## 2020-06-26 RX ADMIN — HYDROMORPHONE HYDROCHLORIDE PRN MG: 1 INJECTION, SOLUTION INTRAMUSCULAR; INTRAVENOUS; SUBCUTANEOUS at 14:07

## 2020-06-26 RX ADMIN — REPAGLINIDE SCH: 1 TABLET ORAL at 16:49

## 2020-06-26 RX ADMIN — CARVEDILOL SCH MG: 12.5 TABLET, FILM COATED ORAL at 11:31

## 2020-06-26 RX ADMIN — CARVEDILOL SCH MG: 12.5 TABLET, FILM COATED ORAL at 15:16

## 2020-06-26 RX ADMIN — CEFAZOLIN SCH MLS/HR: 330 INJECTION, POWDER, FOR SOLUTION INTRAMUSCULAR; INTRAVENOUS at 01:54

## 2020-06-26 RX ADMIN — CEFAZOLIN SCH MLS/HR: 330 INJECTION, POWDER, FOR SOLUTION INTRAMUSCULAR; INTRAVENOUS at 13:43

## 2020-06-26 RX ADMIN — HYDROMORPHONE HYDROCHLORIDE PRN MG: 1 INJECTION, SOLUTION INTRAMUSCULAR; INTRAVENOUS; SUBCUTANEOUS at 01:54

## 2020-06-26 RX ADMIN — GABAPENTIN SCH MG: 400 CAPSULE ORAL at 21:26

## 2020-06-26 RX ADMIN — GABAPENTIN SCH MG: 400 CAPSULE ORAL at 11:31

## 2020-06-26 RX ADMIN — PANTOPRAZOLE SODIUM SCH MG: 40 INJECTION, POWDER, FOR SOLUTION INTRAVENOUS at 11:31

## 2020-06-26 NOTE — MR
EXAMINATION TYPE: MR lumbar spine wo con

 

DATE OF EXAM: 6/26/2020

 

COMPARISON: None

 

HISTORY: Severe lower Back Pain

 

Multiplanar multiecho imaging of the lumbar spine was performed with no contrast.

 

There is mild narrowing of the disc spaces throughout the lumbar spine. There is 5 mm anterior sublux
ation of L4 in relation L5. There is hypertrophic facet arthropathy and resultant severe spinal steno
sis at L4-5. There is no compression fracture. There is no lumbar paraspinal mass. The visualized sac
roiliac joints appear intact. I see no focal bone destruction. There is small posterior disc bulge at
 L5-S1 without compromise of the spinal canal. 

 

There is small posterior disc herniation at L1 2 elevating in the posterior longitudinal ligament on 
the posterior aspect of the L2 vertebral body. There is developmentally adequate canal at this level 
and no spinal stenosis.

 

IMPRESSION:

Degenerative first-degree L4-5 spondylolisthesis with severe L4-5 bony spinal stenosis related to fac
et arthropathy and subluxation deformity. No fracture. Abdomen soft, nontender, nondistended, bowel sounds present in all 4 quadrants.

## 2020-06-26 NOTE — HP
HISTORY AND PHYSICAL



CHIEF COMPLAINT:

79-year-old white female, dyslipidemia, hypertension, diabetes mellitus, presents with

left hip pain over the past year radiates to the left foot, worsening, unable to walk

on it. Her leg was numb.  She is unable to ambulate.  She wants an epidural injection

as she is unable to live with this pain and inability to ambulate.  Denies any nausea,

vomiting, headache, visual changes, abdominal pain.



MEDICATIONS:

Lasix 40 daily, aspirin 81 mg daily.  Simvastatin 80 daily, magnesium 200 b.i.d.,

multivitamin daily, Requip 0.5 b.i.d., prednisone 10 mg daily, Coreg 12.5 b.i.d.,

Vitamin D3 400 units daily.  Lasix 20 daily.



ALLERGIES:

Negative.



14-point review of systems negative except for mentioned in HPI.



PAST MEDICAL HISTORY:

Diabetes mellitus, dyslipidemia, hypertension,  memory impairment.



SOCIAL HISTORY:

Former smoker.



FAMILY HISTORY:

Father negative.  Mother negative.



PHYSICAL EXAM:

Vital signs stable.  Afebrile.  Well developed, well nourished white female in no acute

distress.  Pupils equal, round, reactive.

NECK:  Supple.  No mass.

RESPIRATORY:  Normal lung sounds.  Decreased breath sounds.

CARDIAC:  Regular rate and rhythm, S1, S2.  GI soft, nontender.

Extremities:  Straight leg raising test left leg.  Dorsalis pedis, posterior tibials

are normal.  Difficulties with range of motion of hip and lifting her left leg off the

bed.



ASSESSMENT AND PLAN:

1. Acute lumbar disc herniation.

2. Lumbar neuritis.

3. She will need an epidural injection.

4. History of diabetes and diabetic neuropathy.

5. Diastolic congestive heart failure.



PLAN:

Admit the patient.  Anesthesia to give epidural shot.  MRI of the lumbar spine is

pending.  Please see further orders and try to get an epidural prior to discharge.





MMODL / IJN: [id number] / Job#: [id number]

## 2020-06-27 LAB
ANION GAP SERPL CALC-SCNC: 6 MMOL/L
BASOPHILS # BLD AUTO: 0 K/UL (ref 0–0.2)
BASOPHILS NFR BLD AUTO: 0 %
BUN SERPL-SCNC: 32 MG/DL (ref 7–17)
CALCIUM SPEC-MCNC: 8.7 MG/DL (ref 8.4–10.2)
CHLORIDE SERPL-SCNC: 108 MMOL/L (ref 98–107)
CO2 SERPL-SCNC: 29 MMOL/L (ref 22–30)
EOSINOPHIL # BLD AUTO: 0.1 K/UL (ref 0–0.7)
EOSINOPHIL NFR BLD AUTO: 1 %
ERYTHROCYTE [DISTWIDTH] IN BLOOD BY AUTOMATED COUNT: 4.38 M/UL (ref 3.8–5.4)
ERYTHROCYTE [DISTWIDTH] IN BLOOD: 13.2 % (ref 11.5–15.5)
GLUCOSE BLD-MCNC: 162 MG/DL (ref 75–99)
GLUCOSE BLD-MCNC: 232 MG/DL (ref 75–99)
GLUCOSE BLD-MCNC: 254 MG/DL (ref 75–99)
GLUCOSE BLD-MCNC: 287 MG/DL (ref 75–99)
GLUCOSE SERPL-MCNC: 132 MG/DL (ref 74–99)
HCT VFR BLD AUTO: 41.8 % (ref 34–46)
HGB BLD-MCNC: 13.1 GM/DL (ref 11.4–16)
LYMPHOCYTES # SPEC AUTO: 0.6 K/UL (ref 1–4.8)
LYMPHOCYTES NFR SPEC AUTO: 5 %
MCH RBC QN AUTO: 30 PG (ref 25–35)
MCHC RBC AUTO-ENTMCNC: 31.4 G/DL (ref 31–37)
MCV RBC AUTO: 95.5 FL (ref 80–100)
MONOCYTES # BLD AUTO: 0.2 K/UL (ref 0–1)
MONOCYTES NFR BLD AUTO: 2 %
NEUTROPHILS # BLD AUTO: 11.2 K/UL (ref 1.3–7.7)
NEUTROPHILS NFR BLD AUTO: 93 %
PLATELET # BLD AUTO: 176 K/UL (ref 150–450)
POTASSIUM SERPL-SCNC: 4.6 MMOL/L (ref 3.5–5.1)
SODIUM SERPL-SCNC: 143 MMOL/L (ref 137–145)
WBC # BLD AUTO: 12.1 K/UL (ref 3.8–10.6)

## 2020-06-27 RX ADMIN — METHYLPREDNISOLONE SODIUM SUCCINATE SCH MG: 40 INJECTION, POWDER, FOR SOLUTION INTRAMUSCULAR; INTRAVENOUS at 08:30

## 2020-06-27 RX ADMIN — GABAPENTIN SCH MG: 400 CAPSULE ORAL at 16:22

## 2020-06-27 RX ADMIN — ATORVASTATIN CALCIUM SCH MG: 40 TABLET, FILM COATED ORAL at 21:54

## 2020-06-27 RX ADMIN — INSULIN ASPART SCH UNIT: 100 INJECTION, SOLUTION INTRAVENOUS; SUBCUTANEOUS at 18:18

## 2020-06-27 RX ADMIN — CARVEDILOL SCH MG: 12.5 TABLET, FILM COATED ORAL at 08:30

## 2020-06-27 RX ADMIN — REPAGLINIDE SCH MG: 1 TABLET ORAL at 18:18

## 2020-06-27 RX ADMIN — INSULIN ASPART SCH UNIT: 100 INJECTION, SOLUTION INTRAVENOUS; SUBCUTANEOUS at 08:37

## 2020-06-27 RX ADMIN — CEFAZOLIN SCH MLS/HR: 330 INJECTION, POWDER, FOR SOLUTION INTRAMUSCULAR; INTRAVENOUS at 01:29

## 2020-06-27 RX ADMIN — CARVEDILOL SCH MG: 12.5 TABLET, FILM COATED ORAL at 18:18

## 2020-06-27 RX ADMIN — GABAPENTIN SCH MG: 400 CAPSULE ORAL at 21:54

## 2020-06-27 RX ADMIN — CHOLECALCIFEROL TAB 10 MCG (400 UNIT) SCH UNIT: 10 TAB at 08:38

## 2020-06-27 RX ADMIN — PIOGLITAZONE SCH MG: 15 TABLET ORAL at 08:38

## 2020-06-27 RX ADMIN — METHYLPREDNISOLONE SODIUM SUCCINATE SCH MG: 40 INJECTION, POWDER, FOR SOLUTION INTRAMUSCULAR; INTRAVENOUS at 16:22

## 2020-06-27 RX ADMIN — REPAGLINIDE SCH MG: 1 TABLET ORAL at 13:12

## 2020-06-27 RX ADMIN — GABAPENTIN SCH MG: 400 CAPSULE ORAL at 08:30

## 2020-06-27 RX ADMIN — METHYLPREDNISOLONE SODIUM SUCCINATE SCH MG: 40 INJECTION, POWDER, FOR SOLUTION INTRAMUSCULAR; INTRAVENOUS at 00:31

## 2020-06-27 RX ADMIN — CEFAZOLIN SCH MLS/HR: 330 INJECTION, POWDER, FOR SOLUTION INTRAMUSCULAR; INTRAVENOUS at 18:20

## 2020-06-27 RX ADMIN — PANTOPRAZOLE SODIUM SCH MG: 40 INJECTION, POWDER, FOR SOLUTION INTRAVENOUS at 08:30

## 2020-06-27 RX ADMIN — REPAGLINIDE SCH MG: 1 TABLET ORAL at 08:38

## 2020-06-27 RX ADMIN — INSULIN ASPART SCH UNIT: 100 INJECTION, SOLUTION INTRAVENOUS; SUBCUTANEOUS at 21:55

## 2020-06-27 RX ADMIN — INSULIN ASPART SCH UNIT: 100 INJECTION, SOLUTION INTRAVENOUS; SUBCUTANEOUS at 13:12

## 2020-06-28 LAB
GLUCOSE BLD-MCNC: 235 MG/DL (ref 75–99)
GLUCOSE BLD-MCNC: 244 MG/DL (ref 75–99)
GLUCOSE BLD-MCNC: 267 MG/DL (ref 75–99)
GLUCOSE BLD-MCNC: 285 MG/DL (ref 75–99)

## 2020-06-28 RX ADMIN — METHYLPREDNISOLONE SODIUM SUCCINATE SCH MG: 40 INJECTION, POWDER, FOR SOLUTION INTRAMUSCULAR; INTRAVENOUS at 08:05

## 2020-06-28 RX ADMIN — CEFAZOLIN SCH: 330 INJECTION, POWDER, FOR SOLUTION INTRAMUSCULAR; INTRAVENOUS at 21:57

## 2020-06-28 RX ADMIN — ATORVASTATIN CALCIUM SCH MG: 40 TABLET, FILM COATED ORAL at 21:58

## 2020-06-28 RX ADMIN — METHYLPREDNISOLONE SODIUM SUCCINATE SCH MG: 40 INJECTION, POWDER, FOR SOLUTION INTRAMUSCULAR; INTRAVENOUS at 16:35

## 2020-06-28 RX ADMIN — INSULIN ASPART SCH UNIT: 100 INJECTION, SOLUTION INTRAVENOUS; SUBCUTANEOUS at 08:05

## 2020-06-28 RX ADMIN — CEFAZOLIN SCH MLS/HR: 330 INJECTION, POWDER, FOR SOLUTION INTRAMUSCULAR; INTRAVENOUS at 04:45

## 2020-06-28 RX ADMIN — INSULIN ASPART SCH UNIT: 100 INJECTION, SOLUTION INTRAVENOUS; SUBCUTANEOUS at 12:58

## 2020-06-28 RX ADMIN — GABAPENTIN SCH MG: 400 CAPSULE ORAL at 16:35

## 2020-06-28 RX ADMIN — INSULIN ASPART SCH UNIT: 100 INJECTION, SOLUTION INTRAVENOUS; SUBCUTANEOUS at 21:58

## 2020-06-28 RX ADMIN — INSULIN ASPART SCH UNIT: 100 INJECTION, SOLUTION INTRAVENOUS; SUBCUTANEOUS at 18:05

## 2020-06-28 RX ADMIN — GABAPENTIN SCH MG: 400 CAPSULE ORAL at 08:05

## 2020-06-28 RX ADMIN — PIOGLITAZONE SCH MG: 15 TABLET ORAL at 08:05

## 2020-06-28 RX ADMIN — REPAGLINIDE SCH MG: 1 TABLET ORAL at 08:04

## 2020-06-28 RX ADMIN — CARVEDILOL SCH MG: 12.5 TABLET, FILM COATED ORAL at 08:05

## 2020-06-28 RX ADMIN — GABAPENTIN SCH MG: 400 CAPSULE ORAL at 21:58

## 2020-06-28 RX ADMIN — CHOLECALCIFEROL TAB 10 MCG (400 UNIT) SCH UNIT: 10 TAB at 08:05

## 2020-06-28 RX ADMIN — METHYLPREDNISOLONE SODIUM SUCCINATE SCH MG: 40 INJECTION, POWDER, FOR SOLUTION INTRAMUSCULAR; INTRAVENOUS at 00:15

## 2020-06-28 RX ADMIN — PANTOPRAZOLE SODIUM SCH MG: 40 INJECTION, POWDER, FOR SOLUTION INTRAVENOUS at 08:05

## 2020-06-28 RX ADMIN — REPAGLINIDE SCH MG: 1 TABLET ORAL at 18:05

## 2020-06-28 RX ADMIN — CARVEDILOL SCH MG: 12.5 TABLET, FILM COATED ORAL at 18:05

## 2020-06-28 RX ADMIN — REPAGLINIDE SCH MG: 1 TABLET ORAL at 12:58

## 2020-06-28 NOTE — PN
PROGRESS NOTE



DATE OF SERVICE:

06/27/2020



79-year-old white female with irretractable left leg pain.  MRI shows severe lumbar

stenosis.  Due to difficulty with ambulation, she will stay on IV steroids over the

weekend until epidural can be done on Monday.



Cardiovascular S1-S2.  Lungs clear.  GI soft.  Musculoskeletal limited motion of the

left leg.



ASSESSMENT:

Acute lumbar disc herniation with severe spinal stenosis.  Lumbar epidural on Monday by

[facility].  Continue with IV steroids.  Prognosis is guarded.  She does not

want surgery. There is no neuro surgery people available on the weekend. Will have to

wait for pain management on Monday.  Continue current treatments.





MMODL / IJN: [id number] / Job#: [id number]

## 2020-06-28 NOTE — PN
PROGRESS NOTE



79-year-old white female with acute lumbar radiculopathy of the left leg.  MRI shows

severe lumbar stenosis, degenerative disc disease.  She did have epidural scheduled for

tomorrow for pain clinic physician.



Cardiovascular S1-S2. Lungs clear.  GI soft.  Hematology negative Homans.



ASSESSMENT:

1. Acute lumbar disc disease.

2. Irretractable lumbar pain.

Continue with IV steroids.  Possible discharge home.  Possible outpatient epidural

shot.  Please see further orders.  Accu-Chek protocol due to steroids and hyperglycemia

secondary to steroids.  Please see further orders.





MMODL / IJN: [id number] / Job#: [id number]

## 2020-06-29 LAB
GLUCOSE BLD-MCNC: 226 MG/DL (ref 75–99)
GLUCOSE BLD-MCNC: 273 MG/DL (ref 75–99)
GLUCOSE BLD-MCNC: 299 MG/DL (ref 75–99)
GLUCOSE BLD-MCNC: 307 MG/DL (ref 75–99)

## 2020-06-29 RX ADMIN — INSULIN ASPART SCH UNIT: 100 INJECTION, SOLUTION INTRAVENOUS; SUBCUTANEOUS at 09:47

## 2020-06-29 RX ADMIN — METHYLPREDNISOLONE SODIUM SUCCINATE SCH MG: 40 INJECTION, POWDER, FOR SOLUTION INTRAMUSCULAR; INTRAVENOUS at 15:52

## 2020-06-29 RX ADMIN — REPAGLINIDE SCH MG: 1 TABLET ORAL at 17:47

## 2020-06-29 RX ADMIN — GABAPENTIN SCH MG: 400 CAPSULE ORAL at 15:52

## 2020-06-29 RX ADMIN — ATORVASTATIN CALCIUM SCH MG: 40 TABLET, FILM COATED ORAL at 20:43

## 2020-06-29 RX ADMIN — GABAPENTIN SCH MG: 400 CAPSULE ORAL at 09:46

## 2020-06-29 RX ADMIN — METHYLPREDNISOLONE SODIUM SUCCINATE SCH MG: 40 INJECTION, POWDER, FOR SOLUTION INTRAMUSCULAR; INTRAVENOUS at 00:05

## 2020-06-29 RX ADMIN — PANTOPRAZOLE SODIUM SCH MG: 40 TABLET, DELAYED RELEASE ORAL at 09:47

## 2020-06-29 RX ADMIN — CEFAZOLIN SCH: 330 INJECTION, POWDER, FOR SOLUTION INTRAMUSCULAR; INTRAVENOUS at 07:36

## 2020-06-29 RX ADMIN — CARVEDILOL SCH MG: 12.5 TABLET, FILM COATED ORAL at 17:47

## 2020-06-29 RX ADMIN — METHYLPREDNISOLONE SODIUM SUCCINATE SCH MG: 40 INJECTION, POWDER, FOR SOLUTION INTRAMUSCULAR; INTRAVENOUS at 09:47

## 2020-06-29 RX ADMIN — CARVEDILOL SCH MG: 12.5 TABLET, FILM COATED ORAL at 09:47

## 2020-06-29 RX ADMIN — GABAPENTIN SCH MG: 400 CAPSULE ORAL at 20:43

## 2020-06-29 RX ADMIN — REPAGLINIDE SCH MG: 1 TABLET ORAL at 12:06

## 2020-06-29 RX ADMIN — PIOGLITAZONE SCH MG: 15 TABLET ORAL at 09:48

## 2020-06-29 RX ADMIN — INSULIN ASPART SCH UNIT: 100 INJECTION, SOLUTION INTRAVENOUS; SUBCUTANEOUS at 12:06

## 2020-06-29 RX ADMIN — CHOLECALCIFEROL TAB 10 MCG (400 UNIT) SCH UNIT: 10 TAB at 09:48

## 2020-06-29 RX ADMIN — INSULIN ASPART SCH UNIT: 100 INJECTION, SOLUTION INTRAVENOUS; SUBCUTANEOUS at 21:21

## 2020-06-29 RX ADMIN — INSULIN ASPART SCH UNIT: 100 INJECTION, SOLUTION INTRAVENOUS; SUBCUTANEOUS at 17:47

## 2020-06-29 RX ADMIN — REPAGLINIDE SCH MG: 1 TABLET ORAL at 09:48

## 2020-06-29 RX ADMIN — CEFAZOLIN SCH MLS/HR: 330 INJECTION, POWDER, FOR SOLUTION INTRAMUSCULAR; INTRAVENOUS at 20:43

## 2020-06-29 NOTE — P.CONS
History of Present Illness





- Reason for Consult


Consult date: 06/29/20





- Chief Complaint


Lower back and left leg pain





- History of Present Illness


This is a 79-year-old lady with history of chronic lower back pain with 

radiation to the left lower extremity down to the toes with occasional numbness 

and tingling in the first toe.  Patient's pain gets worse with activity but she 

denies any weakness in the lower extremities or any bowel or bladder 

dysfunction.  MRI of the lumbar spine showed lumbar spondylolisthesis and severe

stenosis at the L4 5 level.  It also showed widespread degenerative disc 

disease.








Past Medical History


Past Medical History: Diabetes Mellitus, Hyperlipidemia, Hypertension, Memory 

Impairment, Pneumonia


History of Any Multi-Drug Resistant Organisms: None Reported


Past Surgical History: Hysterectomy, Tonsillectomy


Additional Past Surgical History / Comment(s): right leg vein stripping


Past Anesthesia/Blood Transfusion Reactions: No Reported Reaction


Past Psychological History: No Psychological Hx Reported


Smoking Status: Former smoker


Past Alcohol Use History: None Reported


Past Drug Use History: None Reported





- Past Family History


  ** Father


Family Medical History: No Reported History





  ** Mother


Family Medical History: No Reported History





Medications and Allergies


                                Home Medications











 Medication  Instructions  Recorded  Confirmed  Type


 


Simvastatin [Zocor] 80 mg PO HS 09/22/14 06/25/20 History


 


predniSONE 10 mg PO DAILY 12/30/18 06/25/20 History


 


Carvedilol [Coreg] 12.5 mg PO BID 06/05/19 06/25/20 History


 


Cholecalciferol [Vitamin D3] 400 unit PO DAILY 09/24/19 06/25/20 History


 


Furosemide [Lasix] 40 mg PO BID 09/24/19 06/25/20 History


 


Dulaglutide [Trulicity] 1.5 mg SQ TH 06/25/20 06/25/20 History


 


Gabapentin [Neurontin] 400 mg PO TID 06/25/20 06/25/20 History


 


Pioglitazone [Actos] 15 mg PO DAILY 06/25/20 06/25/20 History


 


Repaglinide [Prandin] 1 mg PO AC-TID 06/25/20 06/25/20 History








                                    Allergies











Allergy/AdvReac Type Severity Reaction Status Date / Time


 


No Known Allergies Allergy   Verified 06/25/20 22:33














Physical Exam


Vitals: 


                                   Vital Signs











  Temp Pulse Pulse Resp BP Pulse Ox


 


 06/29/20 07:56  97.8 F   80  18  151/71  96


 


 06/29/20 04:00   60   16  


 


 06/29/20 00:00  97.5 F L  53 L  53 L  18  163/73  94 L


 


 06/28/20 20:00  97.6 F  62  62  17  165/72  97


 


 06/28/20 16:51     16  


 


 06/28/20 16:32  96.8 F L  61   16  157/67  90 L


 


 06/28/20 12:57  97 F L     


 


 06/28/20 12:54   59 L   14  123/77  95








                                Intake and Output











 06/28/20 06/29/20 06/29/20





 22:59 06:59 14:59


 


Intake Total 930 300 480


 


Balance 930 300 480


 


Intake:   


 


  Oral 930 300 480


 


Other:   


 


  Voiding Method Toilet Toilet Toilet


 


  # Voids 2 1 1














- Constitutional


General appearance: obese





- Neurologic


Neuro exam of the lower extremities showed normal and symmetrical muscle streng

th bilaterally.  Straight leg raising test mildly positive on the left side.  

The patient has very mild tenderness in the lumbar paravertebral area.





Neurologic: CNII-XII intact





- Psychiatric


Psychiatric: A&O x's 3, appropriate affect, intact judgment & insight





Results


CBC & Chem 7: 


                                 06/27/20 05:59





                                 06/27/20 05:59


Labs: 


                  Abnormal Lab Results - Last 24 Hours (Table)











  06/28/20 06/28/20 06/28/20 Range/Units





  11:54 17:10 21:22 


 


POC Glucose (mg/dL)  244 H  267 H  285 H  (75-99)  mg/dL














  06/29/20 06/29/20 Range/Units





  07:05 11:32 


 


POC Glucose (mg/dL)  226 H  299 H  (75-99)  mg/dL














Assessment and Plan


Plan: 


This is a 79-year-old lady with severe lumbar stenosis and spondylolisthesis at 

the L4 5 level and left lumbar radiculopathy.  The patient may benefit from 

getting lumbar epidural steroid injection in the left paramedian approach under 

fluoroscopic guidance.  The procedure was explained to the patient and her 

questions were answered.  We will make the patient nothing by mouth after 

midnight.  The patient denies being on any anticoagulant treatment but she does 

have history of diabetes mellitus.





I thank you for the consultation

## 2020-06-29 NOTE — PN
PROGRESS NOTE



This patient is a 79-year-old white female who is on IV Solu-Medrol 40 q.8 hours.

Waiting for epidural shot tomorrow [facility].  Once she has epidural,

she will possibly be able to be discharged home. She has severe stenosis at L4-L5,

degenerative disc disease.  Numbness and tingling in that leg, chronic, which is worse

than normal; unable to have any improvement or able to walk.



ASSESSMENT:

1. Preoperative lumbar epidural steroid injection in the left paramedian approach

    under fluoroscopy tomorrow.

2. Diabetes mellitus, controlled.

3. Hypertension.

4. Chronic obstructive pulmonary disease. _____ treatment epidural then go home.





MMODL / IJN: [id number] / Job#: [id number]

## 2020-06-30 VITALS
TEMPERATURE: 98.4 F | HEART RATE: 53 BPM | SYSTOLIC BLOOD PRESSURE: 148 MMHG | DIASTOLIC BLOOD PRESSURE: 66 MMHG | RESPIRATION RATE: 12 BRPM

## 2020-06-30 LAB
GLUCOSE BLD-MCNC: 116 MG/DL (ref 75–99)
GLUCOSE BLD-MCNC: 153 MG/DL (ref 75–99)
GLUCOSE BLD-MCNC: 159 MG/DL (ref 75–99)

## 2020-06-30 PROCEDURE — 3E0R3BZ INTRODUCTION OF ANESTHETIC AGENT INTO SPINAL CANAL, PERCUTANEOUS APPROACH: ICD-10-PCS

## 2020-06-30 PROCEDURE — 3E0R33Z INTRODUCTION OF ANTI-INFLAMMATORY INTO SPINAL CANAL, PERCUTANEOUS APPROACH: ICD-10-PCS

## 2020-06-30 RX ADMIN — CEFAZOLIN SCH: 330 INJECTION, POWDER, FOR SOLUTION INTRAMUSCULAR; INTRAVENOUS at 10:42

## 2020-06-30 RX ADMIN — PIOGLITAZONE SCH MG: 15 TABLET ORAL at 10:42

## 2020-06-30 RX ADMIN — CARVEDILOL SCH MG: 12.5 TABLET, FILM COATED ORAL at 07:52

## 2020-06-30 RX ADMIN — METHYLPREDNISOLONE SODIUM SUCCINATE SCH MG: 40 INJECTION, POWDER, FOR SOLUTION INTRAMUSCULAR; INTRAVENOUS at 07:52

## 2020-06-30 RX ADMIN — REPAGLINIDE SCH MG: 1 TABLET ORAL at 10:42

## 2020-06-30 RX ADMIN — INSULIN ASPART SCH UNIT: 100 INJECTION, SOLUTION INTRAVENOUS; SUBCUTANEOUS at 12:37

## 2020-06-30 RX ADMIN — PANTOPRAZOLE SODIUM SCH MG: 40 TABLET, DELAYED RELEASE ORAL at 07:52

## 2020-06-30 RX ADMIN — PIOGLITAZONE SCH: 15 TABLET ORAL at 07:53

## 2020-06-30 RX ADMIN — GABAPENTIN SCH MG: 400 CAPSULE ORAL at 07:52

## 2020-06-30 RX ADMIN — GABAPENTIN SCH MG: 400 CAPSULE ORAL at 15:38

## 2020-06-30 RX ADMIN — CHOLECALCIFEROL TAB 10 MCG (400 UNIT) SCH: 10 TAB at 07:52

## 2020-06-30 RX ADMIN — INSULIN ASPART SCH: 100 INJECTION, SOLUTION INTRAVENOUS; SUBCUTANEOUS at 07:44

## 2020-06-30 RX ADMIN — REPAGLINIDE SCH: 1 TABLET ORAL at 07:52

## 2020-06-30 RX ADMIN — METHYLPREDNISOLONE SODIUM SUCCINATE SCH MG: 40 INJECTION, POWDER, FOR SOLUTION INTRAMUSCULAR; INTRAVENOUS at 00:27

## 2020-06-30 NOTE — P.PCN
Date of Procedure: 06/30/20


Procedure(s) Performed: 


PREOPERATIVE DIAGNOSIS: 


1- Lumbar radiculopathy, Lumbar Degenerative Disc Diseases


2-Lumbar spondylosis with  Facet arthropathy without myelopathy


POSTOPERATIVE DIAGNOSIS: 


1-Lumber Degenerative Disc Diseases


2-Lumbar spondylosis with  Facet arthropathy without myelopathy


PROCEDURE


1. Lumbar epidural steroid injection under fluoroscopic guidance at the L4-5 

level using a left paramedian approach


2. Lumbar epidurogram. 


ANESTHESIA: Local with 1% lidocaine 3 ml, no IV sedation was used





Fluoroscopy was used for the procedure and images were saved in the radiology 

portion of the chart.








EBL: Minimal


PROCEDURE INDICATION: The patient with low back pain and radiculitis symptoms 

unresponsive to conservative treatment. Fluoroscopy was used to optimize 

visualization of the needle placement and to maximize safety. 


PROCEDURE DESCRIPTION / TECHNIQUE: 


  The patient was seen and identified in the preoperative area. Risks, benefits,

complications including but not limited to infections ,bleeding ,allergic 

reaction to the medications ,nerve damage and incomplete pain releif , and 

alternatives were discussed with the patient. The patient agreed to proceed with

the procedure and signed the consent. IV was started, and vital signs were 

stable.


  Patient was taken to the OR and time out was completed. The patient was placed

 in the prone position on procedure table and a pillow was placed under the 

abdomen to reduce lumbar lordosis. The  lumbosacral area was prepped  and draped

in the usual sterile fashion. Vitals were closely monitored during the 

procedure.  


Using anterior-posterior fluoroscopy, the L4-5 interlaminar space was identified

and the skin over this site was marked and then infiltrated with 1% lidocaine 

subcutaneously. Subsequently, a 20-gauge 3.5" Tuohy epidural needle was inserted

and advanced toward the epidural space using the loss of resistance technique 

and guided by AP and lateral/ oblique fluoroscopy. The correct needle position 

in the epidural space was verified with the injection of 2 mL of the water 

soluble contrast dye Isovue 200  contrast under live fluoroscopy, observing an 

excellent epidurogram. Then, after negative aspiration for blood and CSF and in 

the absence of paresthesias, a 5  ml mixture containing 40 mg of Depo-medrol , 3

 ml of preservative free Normal Saline, and 1 ml of preservative free lidocaine 

1% solution was injected and a washout epidurogram was seen. Needle was 

withdrawn intact, skin was cleansed, and bandages were applied. 





COMPLICATIONS: None


DISPOSITION / PLANS: The patient was placed in a supine position and transferred

to the recovery area in a stable condition for observation. There was no 

evidence of lower extremity motor or sensory deficit after the procedure.  

Patient was discharged from the recovery room after meeting discharge criteria. 

Home discharge instructions were given to the patient by the staff. The patient 

will schedule a follow up in the clinic in 2-4 weeks.

## 2020-06-30 NOTE — P.DS
Providers


Date of admission: 


06/27/20 12:40





Expected date of discharge: 06/30/20


Attending physician: 


Poli Pate





[facility] care [facility]: 


[facility]





[facility] Course: 


Final diagnoses


Lumbar radiculopathy, lumbar degenerative disc disease in a patient with history

of stenosis greatest at L4 to L5, market facet arthropathy, degenerative disc 

disease, multilevel foraminal encroachment at L3-L4, L4-L5 and L5-S1 as per 

prior CT.  Current MRI findings as below.


-Peripheral neuropathy,  lumbar radiculopathy, lumbar spondylosis, lumbar 

degenerative disc disease


-History of nicotine dependence


-Diabetes mellitus, uncontrolled, hyperglycemic, now controlled


-Hypertension


-Hyperlipidemia


-History of CVA 


-Gait dysfunction, uses a cane


-Pulmonary hypertension


-Moderate aortic valve sclerosis


























Hospital course this a 79-year-old female admitted with acute lumbar disc 

disease, intractable lumbar pain.  Hip and pelvis x-ray reported no acute 

abnormality, no fracture, no change.  Lumbar spine MRI reported degenerative 

first-degree L4-5 spinal lithiasis with severe L4 to L5 bony spinal stenosis 

related to facet arthropathy and subluxation deformity, no fracture Evaluated by

pain management services, underwent lumbar epidural steroid injection, lumbar 

epiduprogram.  Tolerated procedure well.  Patient will be discharged home today 

in stable condition pending follow-up blood pressure. 














The impression and plan of care has been dictated as directed.





:


I performed a history and examination of this patient,  discussed the same with 

the dictator.  I agree with the dictator's note ,documented as a scribe.  Any 

additional findings or plans will be noted.


Patient Condition at Discharge: Stable





Plan - Discharge Summary


Discharge Rx Participant: No


New Discharge Prescriptions: 


Continue


   Simvastatin [Zocor] 80 mg PO HS


   predniSONE 10 mg PO DAILY


   Carvedilol [Coreg] 12.5 mg PO BID


   Cholecalciferol [Vitamin D3] 400 unit PO DAILY


   Pioglitazone [Actos] 15 mg PO DAILY


   Repaglinide [Prandin] 1 mg PO AC-TID


   Dulaglutide [Trulicity] 1.5 mg SQ TH


   Gabapentin [Neurontin] 400 mg PO TID





Changed


   Furosemide [Lasix] 40 mg PO DAILY #0


Discharge Medication List





Simvastatin [Zocor] 80 mg PO HS 09/22/14 [History]


predniSONE 10 mg PO DAILY 12/30/18 [History]


Carvedilol [Coreg] 12.5 mg PO BID 06/05/19 [History]


Cholecalciferol [Vitamin D3] 400 unit PO DAILY 09/24/19 [History]


Dulaglutide [Trulicity] 1.5 mg SQ TH 06/25/20 [History]


Gabapentin [Neurontin] 400 mg PO TID 06/25/20 [History]


Pioglitazone [Actos] 15 mg PO DAILY 06/25/20 [History]


Repaglinide [Prandin] 1 mg PO AC-TID 06/25/20 [History]


Furosemide [Lasix] 40 mg PO DAILY #0 06/30/20 [Rx]








Follow up Appointment(s)/Referral(s): 


[facility], [NON-STAFF] - 


VICTORINO Ellison DO [Doctor of Osteopathic Medicine] - 1-2 days


Poli Pate MD [Primary Care Provider] - 3 Days


Ambulatory/Diagnostic Orders: 


Basic Metabolic Panel [LAB.AMB] Time Frame: 3 Days, Location: None Selected


Patient Instructions/Handouts:  Leg Pain (ED)


Activity/Diet/Wound Care/Special Instructions: 


Please follow up with primary care in 1-2 days.  Please Tylenol 3 for pain but 

be careful not to fall while taking this.  Follow up with orthopedics as well.  

Return to the emergency room for any worsening symptoms.


Follow up with the Pain Clinic in 2 weeks per their recommendations. They will 

call to schedule a follow up appointment.














Renal function improving, decreased home dose Lasix.

## 2020-07-28 ENCOUNTER — HOSPITAL ENCOUNTER (OUTPATIENT)
Dept: HOSPITAL 47 - PNWHC3 | Age: 80
Discharge: HOME | End: 2020-07-28
Attending: ANESTHESIOLOGY
Payer: MEDICARE

## 2020-07-28 VITALS — RESPIRATION RATE: 18 BRPM | SYSTOLIC BLOOD PRESSURE: 121 MMHG | HEART RATE: 106 BPM | DIASTOLIC BLOOD PRESSURE: 76 MMHG

## 2020-07-28 DIAGNOSIS — Z79.899: ICD-10-CM

## 2020-07-28 DIAGNOSIS — M51.16: ICD-10-CM

## 2020-07-28 DIAGNOSIS — Z87.891: ICD-10-CM

## 2020-07-28 DIAGNOSIS — Z79.891: ICD-10-CM

## 2020-07-28 DIAGNOSIS — Z79.52: ICD-10-CM

## 2020-07-28 DIAGNOSIS — Z79.84: ICD-10-CM

## 2020-07-28 DIAGNOSIS — M47.26: ICD-10-CM

## 2020-07-28 DIAGNOSIS — M53.3: ICD-10-CM

## 2020-07-28 DIAGNOSIS — M48.061: Primary | ICD-10-CM

## 2020-07-28 PROCEDURE — 99211 OFF/OP EST MAY X REQ PHY/QHP: CPT

## 2020-07-28 NOTE — P.PAINPG
Subjective


Progress Note Date: 07/28/20


This is a follow-up visit for this 79-year-old female patient who presents with 

a chief complaint of left low back pain radiating to left knee which began after

a fall.  She has been diagnosed with lumbar degenerative disc disease, lumbar 

radicular pain, lumbar spinal canal stenosis, lumbar spondylosis.  She was seen 

while an inpatient on 06/29/2020.  She underwent lumbar epidural steroid 

injection at L4-5 left paramedian approach on 06/30/2020.  She reports today for

follow-up.  She states the procedure helped her pain by 30% or so.  Her pain 

complaint today is located in the left low back, rated as 8/10.  Pain is worse 

with activity, standing, walking and better with medications.  Pain is described

as constant.


Review of systems is negative for chest pain, shortness of breath, new onset 

weakness, numbness/tingling, abdominal pain, malaise, fever, night sweats, 

chills, homicidal or suicidal ideation, or bowel or bladder incontinence.








Physical exam:


Vitals: Reviewed in EMR


GENERAL: Well appearing, in no acute distress, cane by her side


PSYCH: Mood and affect is appropriate. Awake, alert, and oriented


SKIN: Skin color, texture, turgor normal, no rashes or lesions


HEENT: Normocephalic, atraumatic. EOM intact


CV: No pedal edema


RESP: Respirations are unlabored, no audible wheezing


GI: Abdomen non-distended


MUSCULOSKELETAL: Bilateral lower extremity strength is normal and symmetric. No 

atrophy or tone abnormalities are noted.


Lumbar spine: Straight leg raising in the sitting position is positive on the 

left side for radicular pain.  Mild pain to palpation over the lumbar spine and 

paraspinous muscles, left greater than right.


Buttocks: Tenderness to palpation over the left PSIS, left Camryn test positive, 

left sacral thrust positive, left Gaenslen's test positive


Extremities: Peripheral joint ROM is full and pain free without obvious 

instability or laxity in all four extremities. No edema or skin discolorations 

noted.


Gait: Gait is slow, antalgic, walks with a cane


NEUR: Bilateral lower extremity coordination and muscle stretch


reflexes are physiologic and symmetric.  Negative clonus bilaterally. No loss of

sensation is noted.





 


Imaging: MRI lumbar spine done at [facility] in 6/2020shows stage 1 

spondylolisthesis at L4-5 and severe spinal canal stenosis due to facet 

hypertrophy and subluxation deformity at L4-5 





Assessment:


1.  Lumbar degenerative disc disease, lumbar spondylosis, lumbar radicular pain


2.  Lumbar spinal canal stenosis


3.  Left SI joint dysfunction





Plan:


1.  We will schedule left SI joint injection


2.  Medications: Managed per primary care physician.  Of note, she is on 

prednisone 10 mg daily.  In the future, if she were to have further steroid 

injections, we will have to have a discussion with Dr. Pate regarding 

prednisone


3.  Follow up: For above-mentioned procedure





PQRS measures:





1-Patient's medications are documented in the chart.


2-Tobacco use is negative, counseling given


3-Patient has had a pneumococcal vaccine.


4-Advanced care planning discussed, patient not eligible.


5-Opioid contract not signed with the patient.


6-Pain positive, follow-up visit or procedure scheduled


7-patient blood pressure measured and documented, and is within normal limits


8-Patient's weight was measured, and body mass index ABOVE the normal limits, 

and counseling was done.  Patient instructed to follow up with PCP.


9-Patient WAS NOT identified as an unhealthy alcohol user.





Objective





- Vital Signs


Vital signs: 


                                   Vital Signs











Temp      


 


Pulse  106 H  07/28/20 12:25


 


Resp  18   07/28/20 12:25


 


BP  121/76   07/28/20 12:25


 


Pulse Ox  96   07/28/20 12:25














PQRS Measure Charge Sheet


PQRS Narrative: 


                                        





Smoking Status                   Former smoker


Blood Pressure                   121/76


Pain Intensity [Left Hip]        8


Scale Used                       Numeric (1 - 10)


Hx Alcohol Use (MH)              No








Home Medications: 


Ambulatory Orders





Simvastatin [Zocor] 80 mg PO HS 09/22/14 


predniSONE 10 mg PO DAILY 12/30/18 


Carvedilol [Coreg] 12.5 mg PO BID 06/05/19 


Cholecalciferol [Vitamin D3] 400 unit PO DAILY 09/24/19 


Dulaglutide [Trulicity] 1.5 mg SQ TH 06/25/20 


Gabapentin [Neurontin] 400 mg PO TID 06/25/20 


Pioglitazone [Actos] 15 mg PO DAILY 06/25/20 


Repaglinide [Prandin] 1 mg PO AC-TID 06/25/20 


Furosemide [Lasix] 40 mg PO DAILY #0 06/30/20 


traMADol HCL [Ultram] 50 mg PO TID 3 Days #9 tab 06/30/20 











Controlled Substance Measures





- Controlled Substance Measures


Is patient prescribed a controlled substance at discharge?: No

## 2020-08-04 ENCOUNTER — HOSPITAL ENCOUNTER (OUTPATIENT)
Dept: HOSPITAL 47 - 1SOBS | Age: 80
Setting detail: OBSERVATION
LOS: 3 days | Discharge: HOME HEALTH | End: 2020-08-07
Attending: FAMILY MEDICINE | Admitting: FAMILY MEDICINE
Payer: MEDICARE

## 2020-08-04 DIAGNOSIS — L03.031: Primary | ICD-10-CM

## 2020-08-04 DIAGNOSIS — R41.3: ICD-10-CM

## 2020-08-04 DIAGNOSIS — E78.5: ICD-10-CM

## 2020-08-04 DIAGNOSIS — K80.10: ICD-10-CM

## 2020-08-04 DIAGNOSIS — L03.115: ICD-10-CM

## 2020-08-04 DIAGNOSIS — R10.32: ICD-10-CM

## 2020-08-04 DIAGNOSIS — R10.11: ICD-10-CM

## 2020-08-04 DIAGNOSIS — Z20.828: ICD-10-CM

## 2020-08-04 DIAGNOSIS — Z87.2: ICD-10-CM

## 2020-08-04 DIAGNOSIS — M10.9: ICD-10-CM

## 2020-08-04 DIAGNOSIS — Z79.84: ICD-10-CM

## 2020-08-04 DIAGNOSIS — I10: ICD-10-CM

## 2020-08-04 DIAGNOSIS — Z79.52: ICD-10-CM

## 2020-08-04 DIAGNOSIS — Z87.01: ICD-10-CM

## 2020-08-04 DIAGNOSIS — E11.628: ICD-10-CM

## 2020-08-04 DIAGNOSIS — Z90.710: ICD-10-CM

## 2020-08-04 DIAGNOSIS — Z87.891: ICD-10-CM

## 2020-08-04 DIAGNOSIS — Z90.89: ICD-10-CM

## 2020-08-04 DIAGNOSIS — Z98.890: ICD-10-CM

## 2020-08-04 DIAGNOSIS — Z79.899: ICD-10-CM

## 2020-08-04 LAB
ALBUMIN SERPL-MCNC: 3.6 G/DL (ref 3.5–5)
ALP SERPL-CCNC: 64 U/L (ref 38–126)
ALT SERPL-CCNC: 18 U/L (ref 4–34)
ANION GAP SERPL CALC-SCNC: 9 MMOL/L
AST SERPL-CCNC: 36 U/L (ref 14–36)
BASOPHILS # BLD AUTO: 0 K/UL (ref 0–0.2)
BASOPHILS NFR BLD AUTO: 1 %
BUN SERPL-SCNC: 32 MG/DL (ref 7–17)
CALCIUM SPEC-MCNC: 9.7 MG/DL (ref 8.4–10.2)
CHLORIDE SERPL-SCNC: 103 MMOL/L (ref 98–107)
CO2 SERPL-SCNC: 27 MMOL/L (ref 22–30)
EOSINOPHIL # BLD AUTO: 0.3 K/UL (ref 0–0.7)
EOSINOPHIL NFR BLD AUTO: 4 %
ERYTHROCYTE [DISTWIDTH] IN BLOOD BY AUTOMATED COUNT: 3.88 M/UL (ref 3.8–5.4)
ERYTHROCYTE [DISTWIDTH] IN BLOOD: 13.1 % (ref 11.5–15.5)
GLUCOSE BLD-MCNC: 115 MG/DL (ref 75–99)
GLUCOSE SERPL-MCNC: 174 MG/DL (ref 74–99)
HCT VFR BLD AUTO: 36.1 % (ref 34–46)
HGB BLD-MCNC: 11.6 GM/DL (ref 11.4–16)
LYMPHOCYTES # SPEC AUTO: 1.1 K/UL (ref 1–4.8)
LYMPHOCYTES NFR SPEC AUTO: 13 %
MCH RBC QN AUTO: 29.8 PG (ref 25–35)
MCHC RBC AUTO-ENTMCNC: 32 G/DL (ref 31–37)
MCV RBC AUTO: 93.1 FL (ref 80–100)
MONOCYTES # BLD AUTO: 0.6 K/UL (ref 0–1)
MONOCYTES NFR BLD AUTO: 7 %
NEUTROPHILS # BLD AUTO: 6.3 K/UL (ref 1.3–7.7)
NEUTROPHILS NFR BLD AUTO: 74 %
PH UR: 7 [PH] (ref 5–8)
PLATELET # BLD AUTO: 274 K/UL (ref 150–450)
POTASSIUM SERPL-SCNC: 4.1 MMOL/L (ref 3.5–5.1)
PROT SERPL-MCNC: 6.5 G/DL (ref 6.3–8.2)
RBC UR QL: 1 /HPF (ref 0–5)
SODIUM SERPL-SCNC: 139 MMOL/L (ref 137–145)
SP GR UR: 1.01 (ref 1–1.03)
SQUAMOUS UR QL AUTO: 1 /HPF (ref 0–4)
UROBILINOGEN UR QL STRIP: <2 MG/DL (ref ?–2)
WBC # BLD AUTO: 8.4 K/UL (ref 3.8–10.6)
WBC # UR AUTO: 22 /HPF (ref 0–5)

## 2020-08-04 PROCEDURE — 87040 BLOOD CULTURE FOR BACTERIA: CPT

## 2020-08-04 PROCEDURE — 85025 COMPLETE CBC W/AUTO DIFF WBC: CPT

## 2020-08-04 PROCEDURE — 80053 COMPREHEN METABOLIC PANEL: CPT

## 2020-08-04 PROCEDURE — 96367 TX/PROPH/DG ADDL SEQ IV INF: CPT

## 2020-08-04 PROCEDURE — 74176 CT ABD & PELVIS W/O CONTRAST: CPT

## 2020-08-04 PROCEDURE — 84484 ASSAY OF TROPONIN QUANT: CPT

## 2020-08-04 PROCEDURE — 81001 URINALYSIS AUTO W/SCOPE: CPT

## 2020-08-04 PROCEDURE — 93005 ELECTROCARDIOGRAM TRACING: CPT

## 2020-08-04 PROCEDURE — 84550 ASSAY OF BLOOD/URIC ACID: CPT

## 2020-08-04 PROCEDURE — 96365 THER/PROPH/DIAG IV INF INIT: CPT

## 2020-08-04 PROCEDURE — 87086 URINE CULTURE/COLONY COUNT: CPT

## 2020-08-04 PROCEDURE — 96366 THER/PROPH/DIAG IV INF ADDON: CPT

## 2020-08-04 PROCEDURE — 86140 C-REACTIVE PROTEIN: CPT

## 2020-08-04 PROCEDURE — 85652 RBC SED RATE AUTOMATED: CPT

## 2020-08-04 PROCEDURE — 73630 X-RAY EXAM OF FOOT: CPT

## 2020-08-04 RX ADMIN — GABAPENTIN SCH MG: 400 CAPSULE ORAL at 16:46

## 2020-08-04 RX ADMIN — GABAPENTIN SCH MG: 400 CAPSULE ORAL at 22:02

## 2020-08-04 RX ADMIN — REPAGLINIDE SCH MG: 1 TABLET ORAL at 16:46

## 2020-08-04 RX ADMIN — ATORVASTATIN CALCIUM SCH MG: 40 TABLET, FILM COATED ORAL at 22:02

## 2020-08-04 RX ADMIN — CEFAZOLIN SCH MLS/HR: 330 INJECTION, POWDER, FOR SOLUTION INTRAMUSCULAR; INTRAVENOUS at 16:47

## 2020-08-04 RX ADMIN — FUROSEMIDE SCH MG: 40 TABLET ORAL at 16:47

## 2020-08-04 NOTE — HP
HISTORY AND PHYSICAL



This patient is a 79-year-old white female with cellulitis of the right great toe after

toenail extraction in the office. The toe is actually better than yesterday, but she

still continues to have redness around the toe.  X-ray shows no signs of osteomyelitis.

She is admitted for some IV antibiotics and she is having abdominal pain in the left

lower quadrant, right upper quadrant.  She has missed her medicines for 2 days.  She

feels weak and fatigued, lethargic; unclear etiology.  She has CRP of 137. A consult

with Dr. Garcia is pending as well as a CT scan of the abdomen and pelvis and a urine

culture. We will restart her home medications and rehydrate her.  She has elevated BUN

and creatinine _____ some dehydration.



HOME MEDICATIONS:

Home medications include:

1. Prednisone 10 mg daily.

2. Zocor 80 daily.

3. Prandin 1 mg before meals t.i.d.

4. Actos 15 daily.

5. Norco 5/325 q.8.

6. Neurontin 400 t.i.d.

7. Lasix 40 b.i.d.

8. Trulicity 1.5 mg subcutaneously once a week.

9. Vitamin D3 400 units daily.

10.Coreg 12.5 b.i.d.

11.She has been on Augmentin 875 mg one b.i.d. for the past one day.



REVIEW OF SYSTEMS:

Fourteen-point review of systems negative except for mentioned in HPI.  Abdominal pain,

weakness, fatigue.  Her toe, as mentioned, still hurts to ambulate, but she is

ambulating better.  She is able to move it.  The toe looks less red than yesterday.



PHYSICAL EXAMINATION:

Temperature is 98.8, pulse 85 to 92, respiratory rate 18 to 16, blood pressure 150s

over 70s to 80s. Oxygenation is 95% to 98% on room air.

CARDIOVASCULAR: S1, S2.

LUNGS:  Clear.

GI: Some tenderness to palpation, right upper quadrant.  Mild guarding in left lower

quadrant.

Abdomen is distended.

INTEGUMENT: Right great toe is red, black. Silver nitrate over the great toe base. No

pus or discharge. Redness is decreasing since yesterday. She is able to move it a

little bit better than yesterday.





CT abdomen and pelvis is pending.  Urine culture is pending.



ASSESSMENT:

Cellulitis of the right great toe, diabetic cellulitis of the foot, acute abdominal

pain.  Do CT scan of the abdomen and pelvis.  Urine culture.  Infectious Disease

consult.  Continue home medications. Do baseline EKG and troponin.  Please see further

orders.





MMODL / IJN: [id number] / Job#: [id number]

## 2020-08-04 NOTE — CT
EXAMINATION TYPE: CT abdomen pelvis wo con

 

DATE OF EXAM: 8/4/2020

 

COMPARISON: 12/30/2018

 

HISTORY: Abdominal pain

 

CT DLP: 662.8 mGycm

Automated exposure control for dose reduction was used.

 

Multiple axial sections were obtained from the diaphragm to the floor the pelvis without contrast.

 

There is some mild atelectasis at the lung bases. Heart is enlarged. There is coronary artery calcifi
cation. There is calcification of the mitral annulus. Thoracic aorta is atheromatous. There is no ple
ural effusion.

 

Liver spleen stomach appear normal. Pancreas appears normal. There are multiple calcified gallstones.
 Bile ducts are not dilated.

 

There is no adrenal mass. Kidneys have fairly normal size and contour. There is no hydronephrosis. Ur
eters are not dilated. There is no retroperitoneal adenopathy. Bladder distends smoothly. There is no
 inguinal hernia. Appendix is posterior and appears normal. There is atherosclerotic vascular calcifi
cation in the abdominal aorta and its branches. There is no mesenteric edema. There is no ascites or 
free air. There is no sign of a bowel obstruction.

 

There is a degenerative first-degree L4-5 spondylolisthesis. There is no lumbar compression fracture.
 There is multilevel degenerative disc space narrowing. There is spurring of the endplates. The bony 
pelvis appears intact.

 

IMPRESSION:

Mild cardiomegaly. Atherosclerotic vascular disease. Mild scarring and atelectasis at the lung bases.


 

Cholelithiasis. Normal appendix. No acute abnormality within the abdomen and pelvis. There is overall
 no adverse change compared to old exam.

## 2020-08-04 NOTE — XR
EXAMINATION TYPE: XR foot complete LT

 

DATE OF EXAM: 8/4/2020

 

COMPARISON: 7/20/2017

 

HISTORY: Big toe cellulitis

 

TECHNIQUE: 3 views

 

FINDINGS: There is narrowing of the first MP joint with hallux valgus and hypertrophic spurring. Ther
e are plantar and Achilles calcaneal spurs. There is soft tissue swelling of the forefoot. The metata
rsals appear intact. There is some calcification at the nailbed of the big toe. I see no focal bone d
estruction.

 

IMPRESSION: Soft tissue swelling. This would be consistent with cellulitis. Hallux valgus and osteoar
thritis. No evidence of inflammatory arthritis. No specific sign of osteomyelitis. Soft tissue swelli
ng is new compared to old exam.

## 2020-08-05 LAB
ALBUMIN SERPL-MCNC: 3.1 G/DL (ref 3.5–5)
ALP SERPL-CCNC: 69 U/L (ref 38–126)
ALT SERPL-CCNC: 14 U/L (ref 4–34)
ANION GAP SERPL CALC-SCNC: 6 MMOL/L
AST SERPL-CCNC: 22 U/L (ref 14–36)
BASOPHILS # BLD AUTO: 0.1 K/UL (ref 0–0.2)
BASOPHILS NFR BLD AUTO: 1 %
BUN SERPL-SCNC: 28 MG/DL (ref 7–17)
CALCIUM SPEC-MCNC: 8.9 MG/DL (ref 8.4–10.2)
CHLORIDE SERPL-SCNC: 106 MMOL/L (ref 98–107)
CO2 SERPL-SCNC: 31 MMOL/L (ref 22–30)
EOSINOPHIL # BLD AUTO: 0.2 K/UL (ref 0–0.7)
EOSINOPHIL NFR BLD AUTO: 3 %
ERYTHROCYTE [DISTWIDTH] IN BLOOD BY AUTOMATED COUNT: 3.65 M/UL (ref 3.8–5.4)
ERYTHROCYTE [DISTWIDTH] IN BLOOD: 13.2 % (ref 11.5–15.5)
GLUCOSE BLD-MCNC: 132 MG/DL (ref 75–99)
GLUCOSE BLD-MCNC: 241 MG/DL (ref 75–99)
GLUCOSE BLD-MCNC: 244 MG/DL (ref 75–99)
GLUCOSE BLD-MCNC: 431 MG/DL (ref 75–99)
GLUCOSE SERPL-MCNC: 128 MG/DL (ref 74–99)
HCT VFR BLD AUTO: 34.3 % (ref 34–46)
HGB BLD-MCNC: 11.1 GM/DL (ref 11.4–16)
LYMPHOCYTES # SPEC AUTO: 1 K/UL (ref 1–4.8)
LYMPHOCYTES NFR SPEC AUTO: 15 %
MCH RBC QN AUTO: 30.3 PG (ref 25–35)
MCHC RBC AUTO-ENTMCNC: 32.2 G/DL (ref 31–37)
MCV RBC AUTO: 94 FL (ref 80–100)
MONOCYTES # BLD AUTO: 0.6 K/UL (ref 0–1)
MONOCYTES NFR BLD AUTO: 8 %
NEUTROPHILS # BLD AUTO: 4.6 K/UL (ref 1.3–7.7)
NEUTROPHILS NFR BLD AUTO: 70 %
PLATELET # BLD AUTO: 278 K/UL (ref 150–450)
POTASSIUM SERPL-SCNC: 3.5 MMOL/L (ref 3.5–5.1)
PROT SERPL-MCNC: 5.6 G/DL (ref 6.3–8.2)
SODIUM SERPL-SCNC: 143 MMOL/L (ref 137–145)
WBC # BLD AUTO: 6.7 K/UL (ref 3.8–10.6)

## 2020-08-05 RX ADMIN — CEFAZOLIN SCH: 330 INJECTION, POWDER, FOR SOLUTION INTRAMUSCULAR; INTRAVENOUS at 12:15

## 2020-08-05 RX ADMIN — GABAPENTIN SCH MG: 400 CAPSULE ORAL at 15:48

## 2020-08-05 RX ADMIN — INSULIN ASPART SCH UNIT: 100 INJECTION, SOLUTION INTRAVENOUS; SUBCUTANEOUS at 17:14

## 2020-08-05 RX ADMIN — PIPERACILLIN AND TAZOBACTAM SCH MLS/HR: 3; .375 INJECTION, POWDER, FOR SOLUTION INTRAVENOUS at 00:01

## 2020-08-05 RX ADMIN — GABAPENTIN SCH MG: 400 CAPSULE ORAL at 08:46

## 2020-08-05 RX ADMIN — ATORVASTATIN CALCIUM SCH MG: 40 TABLET, FILM COATED ORAL at 21:04

## 2020-08-05 RX ADMIN — FUROSEMIDE SCH MG: 40 TABLET ORAL at 08:46

## 2020-08-05 RX ADMIN — REPAGLINIDE SCH MG: 1 TABLET ORAL at 17:13

## 2020-08-05 RX ADMIN — REPAGLINIDE SCH MG: 1 TABLET ORAL at 08:48

## 2020-08-05 RX ADMIN — GABAPENTIN SCH MG: 400 CAPSULE ORAL at 21:04

## 2020-08-05 RX ADMIN — INSULIN ASPART SCH UNIT: 100 INJECTION, SOLUTION INTRAVENOUS; SUBCUTANEOUS at 21:04

## 2020-08-05 RX ADMIN — FUROSEMIDE SCH MG: 40 TABLET ORAL at 15:48

## 2020-08-05 RX ADMIN — PIOGLITAZONE SCH MG: 15 TABLET ORAL at 08:48

## 2020-08-05 RX ADMIN — PIPERACILLIN AND TAZOBACTAM SCH MLS/HR: 3; .375 INJECTION, POWDER, FOR SOLUTION INTRAVENOUS at 08:46

## 2020-08-05 RX ADMIN — REPAGLINIDE SCH: 1 TABLET ORAL at 12:17

## 2020-08-05 RX ADMIN — AMPICILLIN SODIUM AND SULBACTAM SODIUM SCH MLS/HR: 2; 1 INJECTION, POWDER, FOR SOLUTION INTRAMUSCULAR; INTRAVENOUS at 17:13

## 2020-08-05 RX ADMIN — HYDROCODONE BITARTRATE AND ACETAMINOPHEN PRN EACH: 5; 325 TABLET ORAL at 00:01

## 2020-08-05 NOTE — PN
PROGRESS NOTE



This patient is a white female who was admitted with cellulitis of the great toe.

Improving with IV antibiotics. She is able to move her toe more. Less redness.  No

fevers, no chills.  X-ray of the foot showed no cellulitis.  She just had some

postoperative cellulitis around the foot.  She has gallstones, right upper quadrant

abdominal pain and a possible UTI.  She is feeling better than when she was admitted.

CARDIOVASCULAR: S1, S2.

Lungs clear.

GI soft.

Hematology: Negative Homans.

Integument: Right great toe swelling, redness, minimal drainage.  Not foul-smelling.



ASSESSMENT:

1. Right toe cellulitis.

2. Possible urinary tract infection.

Continue with Unasyn.  Possible discharge home tomorrow, as she is greatly improved.

Urine culture -- waiting for that report. She is greatly improved. She will probably be

able to be discharged home tomorrow if cleared by surgeon as far as gallbladder issues.





MMODL / IJN: [id number] / Job#: [id number]

## 2020-08-05 NOTE — P.CONS
History of Present Illness





- Reason for Consult


Consult date: 08/05/20


Right big toe cellulitis


Requesting physician: Poli Pate





- Chief Complaint


Right big toe pain swelling redness x few days





- History of Present Illness


Patient is a 79-year-old  female presented in outpatient setting for 

ingrowing toenail and the patient did have a extraction of the right big 

toenail, patient subsequently developing pain swelling and redness to the right 

big toe that has been going on for the last few days.  Complaining of pain right

big toe to be throbbing intensity 6-7 out of 10 and no radiation with associated

swelling redness no significant purulent drainage patient did have some chills 

and did spike a fever of 101F in the hospital, patient did have x-rays of the 

right foot which did not show any bony sclerosis of ostial myelitis also 

complaining of some intermittent upper abdominal pain for the patient did have 

CT of abdominal pelvis did not show any acute abnormality patient was started on

Zosyn she was admitted to the hospital for IV antibiotic therapy and infectious 

disease was consulted for further management of antibiotics








Review of Systems


Positive point has been  mentioned in the HPI rest of the systems are negative








Past Medical History


Past Medical History: Diabetes Mellitus, Hyperlipidemia, Hypertension, Memory 

Impairment, Pneumonia


History of Any Multi-Drug Resistant Organisms: None Reported


Past Surgical History: Hysterectomy, Tonsillectomy


Additional Past Surgical History / Comment(s): right leg vein stripping


Past Anesthesia/Blood Transfusion Reactions: No Reported Reaction


Past Psychological History: No Psychological Hx Reported


Smoking Status: Former smoker


Past Alcohol Use History: None Reported


Additional Past Alcohol Use History / Comment(s): Quit smoking 7-8 yrs ago.


Past Drug Use History: None Reported





- Past Family History


  ** Father


Family Medical History: No Reported History





  ** Mother


Family Medical History: No Reported History





Medications and Allergies


                                Home Medications











 Medication  Instructions  Recorded  Confirmed  Type


 


Simvastatin [Zocor] 80 mg PO HS 09/22/14 08/04/20 History


 


predniSONE 10 mg PO DAILY 12/30/18 08/04/20 History


 


Carvedilol [Coreg] 12.5 mg PO BID 06/05/19 08/04/20 History


 


Cholecalciferol [Vitamin D3] 400 unit PO DAILY 09/24/19 08/04/20 History


 


Dulaglutide [Trulicity] 1.5 mg SQ TH 06/25/20 08/04/20 History


 


Gabapentin [Neurontin] 400 mg PO TID 06/25/20 08/04/20 History


 


Pioglitazone [Actos] 15 mg PO DAILY 06/25/20 08/04/20 History


 


Repaglinide [Prandin] 1 mg PO AC-TID 06/25/20 08/04/20 History


 


Amoxic-Pot Clav 875-125Mg 1 tab PO Q12HR 08/04/20 08/04/20 History





[Augmentin 875-125]    


 


Furosemide [Lasix] 40 mg PO BID 08/04/20 08/04/20 History


 


HYDROcodone/APAP 5-325MG [Norco 1 tab PO Q8H PRN 08/04/20 08/04/20 History





5-325]    








                                    Allergies











Allergy/AdvReac Type Severity Reaction Status Date / Time


 


No Known Allergies Allergy   Verified 07/28/20 12:23














Physical Exam


Vitals: 


                                   Vital Signs











  Temp Pulse Pulse Resp BP Pulse Ox


 


 08/05/20 08:59  98.7 F  64   14  180/80  90 L


 


 08/05/20 03:00  98.2 F   87  18  141/56  93 L


 


 08/04/20 21:00  98.2 F   100  20  156/78  94 L


 


 08/04/20 15:00  98.0 F   85  18  152/86  98


 


 08/04/20 14:45  98.8 F   88  18  151/78  95








                                Intake and Output











 08/04/20 08/05/20 08/05/20





 22:59 06:59 14:59


 


Other:   


 


  Voiding Method Bedside Commode Bedside Commode 





 Diaper Diaper 


 


  # Voids 1 2 











GENERAL DESCRIPTION: An elderly female lying in bed, no distress. No tachypnea 

or accessory muscle of respiration use.


HEENT: Shows Pallor , no scleral icterus. Oral mucous membrane is dry. No 

pharyngeal erythema or thrush


NECK: Trachea central, no thyromegaly.


LUNGS: Unlabored breathing. Clear to auscultation anteriorly. No wheeze or 

crackle.


HEART: S1, S2, regular rate and rhythm. No loud murmur


ABDOMEN: Soft, no tenderness , guarding or rigidity, no organomegaly


EXTREMITIES: No edema of feet.  Right big toe diffuse swelling and redness 

minimal drainage on the dressing no foul-smelling


SKIN: No rash, no masses palpable.


NEUROLOGICAL: The patient is awake, alert, oriented x3, mood and affect normal.

















Results


CBC & Chem 7: 


                                 08/05/20 06:31





                                 08/05/20 06:28


Labs: 


                  Abnormal Lab Results - Last 24 Hours (Table)











  08/04/20 08/04/20 08/04/20 Range/Units





  15:56 15:56 17:23 


 


RBC     (3.80-5.40)  m/uL


 


Hgb     (11.4-16.0)  gm/dL


 


ESR  101 H    (0-20)  mm/hr


 


Carbon Dioxide     (22-30)  mmol/L


 


BUN   32 H   (7-17)  mg/dL


 


Creatinine   1.10 H   (0.52-1.04)  mg/dL


 


Glucose   174 H   (74-99)  mg/dL


 


POC Glucose (mg/dL)     (75-99)  mg/dL


 


C-Reactive Protein   145.6 H   (<10.0)  mg/L


 


Total Protein     (6.3-8.2)  g/dL


 


Albumin     (3.5-5.0)  g/dL


 


Urine Glucose (UA)    Trace H  (Negative)  


 


Ur Leukocyte Esterase    Moderate H  (Negative)  


 


Urine WBC    22 H  (0-5)  /hpf


 


Urine Bacteria    Rare H  (None)  /hpf


 


Urine Mucus    Rare H  (None)  /hpf














  08/04/20 08/05/20 08/05/20 Range/Units





  21:03 06:23 06:28 


 


RBC     (3.80-5.40)  m/uL


 


Hgb     (11.4-16.0)  gm/dL


 


ESR     (0-20)  mm/hr


 


Carbon Dioxide    31 H  (22-30)  mmol/L


 


BUN    28 H  (7-17)  mg/dL


 


Creatinine    1.45 H  (0.52-1.04)  mg/dL


 


Glucose    128 H  (74-99)  mg/dL


 


POC Glucose (mg/dL)  115 H  132 H   (75-99)  mg/dL


 


C-Reactive Protein     (<10.0)  mg/L


 


Total Protein    5.6 L  (6.3-8.2)  g/dL


 


Albumin    3.1 L  (3.5-5.0)  g/dL


 


Urine Glucose (UA)     (Negative)  


 


Ur Leukocyte Esterase     (Negative)  


 


Urine WBC     (0-5)  /hpf


 


Urine Bacteria     (None)  /hpf


 


Urine Mucus     (None)  /hpf














  08/05/20 08/05/20 Range/Units





  06:31 11:34 


 


RBC  3.65 L   (3.80-5.40)  m/uL


 


Hgb  11.1 L   (11.4-16.0)  gm/dL


 


ESR    (0-20)  mm/hr


 


Carbon Dioxide    (22-30)  mmol/L


 


BUN    (7-17)  mg/dL


 


Creatinine    (0.52-1.04)  mg/dL


 


Glucose    (74-99)  mg/dL


 


POC Glucose (mg/dL)   244 H  (75-99)  mg/dL


 


C-Reactive Protein    (<10.0)  mg/L


 


Total Protein    (6.3-8.2)  g/dL


 


Albumin    (3.5-5.0)  g/dL


 


Urine Glucose (UA)    (Negative)  


 


Ur Leukocyte Esterase    (Negative)  


 


Urine WBC    (0-5)  /hpf


 


Urine Bacteria    (None)  /hpf


 


Urine Mucus    (None)  /hpf








                      Microbiology - Last 24 Hours (Table)











 08/04/20 17:23 Urine Culture - Preliminary





 Urine,Voided 














Assessment and Plan


Assessment: 


1- patient with right big toe cellulitis in this patient who recently did have a

right big toe nail extraction now with open wound and secondary cellulitis 

likely from gram-positive skin brandin underlying pulmonary infection less likely 

would not entirely excluded





(1) Cellulitis of right toe


Current Visit: Yes   Status: Acute   Code(s): L03.031 - CELLULITIS OF RIGHT TOE 

 SNOMED Code(s): 30498982


   


Plan: 


1- discontinue Zosyn


2- start the patient Unasyn 3 g every 6 hours


3- local wound care to the right big toe wound with a dry Aquacel silver 

dressing followed by Kerlix changed every 48 hour


We will follow on clinical condition and cultures to further adjust medication 

if needed


Thank you for this consultation will follow this patient with you





Time with Patient: Greater than 30

## 2020-08-05 NOTE — P.GSCN
History of Present Illness


Consult date: 08/05/20


Reason for Consult: 





Dr. Pate


History of present illness: 





CHIEF COMPLAINT: Right upper quadrant pain





HISTORY OF PRESENT ILLNESS: This is a 79-year-old female with a known history of

diabetes, hyperlipidemia and hypertension.  Patient has evidence of a right 

great toe cellulitis after toenail extraction in her PCPs office.  And was 

brought into the hospital for IV antibiotics for her right great toe cellulitis.

 Yesterday she was complaining also of intermittent right upper quadrant 

abdominal pain.  The pain is worse after eating.  She denies any nausea or 

vomiting or change in bowel movements.  She denies any fevers.  Patient is no 

longer having any abdominal pain. 





PAST MEDICAL HISTORY: 


See list.





PAST SURGICAL HISTORY: 


See list.





MEDICATIONS: 


See list.





ALLERGIES: 


See list.





SOCIAL HISTORY: No illicit drug use.  





REVIEW OF SYSTEMS: 


CONSTITUTIONAL: Denies fever or chills.


HEENT: Denies blurred vision, vision changes, or eye pain. Denies hemoptysis 


ENDOCRINE: Denies heat or cold intolerance.


CARDIOVASCULAR: Denies chest pain or pressure.


RESPIRATORY: No shortness of breath. 


GASTROINTESTINAL: Denies abdominal pain. Denies nausea or vomiting.


NEURO: Denies history of seizures.


PSYCH: No depression or suicidal ideation


HEMATOLOGIC: Denies bleeding disorders.


GENITOURINARY:  Denies any blood in urine or increased urinary frequency.  


SKIN: Denies pruitis. Denies rash.





PHYSICAL EXAM: 


VITAL SIGNS: Reviewed


GENERAL: Well-developed in no acute distress. 


HEENT:  No sclera icterus. Extraocular movements grossly intact.  Moist buccal 

mucosa. 


Head is atraumatic, normocephalic. Hears conversational speech. No nasal 

drainage.


ABDOMEN:  Soft.  Nondistended.  Tenderness in right upper quadrant


NEUROLOGIC:  No focal or lateralizing signs.  Cranial nerves II through XII 

grossly intact. 





LABORATORY DATA:





LFTs within normal limits








IMAGING:





Computed tomography scan the abdomen and pelvis without contrast showing 

cholelithiasis.  Normal appendix.  No acute abnormality within the abdomen and 

pelvis.  There is no overall adverse change compared to old exam





ASSESSMENT: 


1.  Abdominal pain likely secondary to chronic cholecystitis.  Pain has now 

improved.  Patient is tolerating diet








PLAN: 


No surgical intervention planned during this hospitalization.  Patient will 

follow-up with Dr. Dr. Heller for outpatient cholecystectomy once patient's 

toe cellulitis has improved.





Thank you for this consultation.  We will continue to follow along during 

patient's hospitalization.





Physician Assistant note has been reviewed by physician. Signing provider agrees

with the documented findings, assessment, and plan of care. 





Past Medical History


Past Medical History: Diabetes Mellitus, Hyperlipidemia, Hypertension, Memory 

Impairment, Pneumonia


History of Any Multi-Drug Resistant Organisms: None Reported


Past Surgical History: Hysterectomy, Tonsillectomy


Additional Past Surgical History / Comment(s): right leg vein stripping


Past Anesthesia/Blood Transfusion Reactions: No Reported Reaction


Past Psychological History: No Psychological Hx Reported


Smoking Status: Former smoker


Past Alcohol Use History: None Reported


Additional Past Alcohol Use History / Comment(s): Quit smoking 7-8 yrs ago.


Past Drug Use History: None Reported





- Past Family History


  ** Father


Family Medical History: No Reported History





  ** Mother


Family Medical History: No Reported History





Medications and Allergies


                                Home Medications











 Medication  Instructions  Recorded  Confirmed  Type


 


Simvastatin [Zocor] 80 mg PO HS 09/22/14 08/04/20 History


 


predniSONE 10 mg PO DAILY 12/30/18 08/04/20 History


 


Carvedilol [Coreg] 12.5 mg PO BID 06/05/19 08/04/20 History


 


Cholecalciferol [Vitamin D3] 400 unit PO DAILY 09/24/19 08/04/20 History


 


Dulaglutide [Trulicity] 1.5 mg SQ TH 06/25/20 08/04/20 History


 


Gabapentin [Neurontin] 400 mg PO TID 06/25/20 08/04/20 History


 


Pioglitazone [Actos] 15 mg PO DAILY 06/25/20 08/04/20 History


 


Repaglinide [Prandin] 1 mg PO AC-TID 06/25/20 08/04/20 History


 


Amoxic-Pot Clav 875-125Mg 1 tab PO Q12HR 08/04/20 08/04/20 History





[Augmentin 875-125]    


 


Furosemide [Lasix] 40 mg PO BID 08/04/20 08/04/20 History


 


HYDROcodone/APAP 5-325MG [Norco 1 tab PO Q8H PRN 08/04/20 08/04/20 History





5-325]    








                                    Allergies











Allergy/AdvReac Type Severity Reaction Status Date / Time


 


No Known Allergies Allergy   Verified 07/28/20 12:23














Surgical - Exam


                                   Vital Signs











Temp Pulse Resp BP Pulse Ox


 


 98.8 F   88   18   151/78   95 


 


 08/04/20 14:45  08/04/20 14:45  08/04/20 14:45  08/04/20 14:45  08/04/20 14:45














Results





- Labs





                                 08/05/20 06:31





                                 08/05/20 06:28


                  Abnormal Lab Results - Last 24 Hours (Table)











  08/04/20 08/04/20 08/04/20 Range/Units





  15:56 15:56 17:23 


 


RBC     (3.80-5.40)  m/uL


 


Hgb     (11.4-16.0)  gm/dL


 


ESR  101 H    (0-20)  mm/hr


 


Carbon Dioxide     (22-30)  mmol/L


 


BUN   32 H   (7-17)  mg/dL


 


Creatinine   1.10 H   (0.52-1.04)  mg/dL


 


Glucose   174 H   (74-99)  mg/dL


 


POC Glucose (mg/dL)     (75-99)  mg/dL


 


C-Reactive Protein   145.6 H   (<10.0)  mg/L


 


Total Protein     (6.3-8.2)  g/dL


 


Albumin     (3.5-5.0)  g/dL


 


Urine Glucose (UA)    Trace H  (Negative)  


 


Ur Leukocyte Esterase    Moderate H  (Negative)  


 


Urine WBC    22 H  (0-5)  /hpf


 


Urine Bacteria    Rare H  (None)  /hpf


 


Urine Mucus    Rare H  (None)  /hpf














  08/04/20 08/05/20 08/05/20 Range/Units





  21:03 06:23 06:28 


 


RBC     (3.80-5.40)  m/uL


 


Hgb     (11.4-16.0)  gm/dL


 


ESR     (0-20)  mm/hr


 


Carbon Dioxide    31 H  (22-30)  mmol/L


 


BUN    28 H  (7-17)  mg/dL


 


Creatinine    1.45 H  (0.52-1.04)  mg/dL


 


Glucose    128 H  (74-99)  mg/dL


 


POC Glucose (mg/dL)  115 H  132 H   (75-99)  mg/dL


 


C-Reactive Protein     (<10.0)  mg/L


 


Total Protein    5.6 L  (6.3-8.2)  g/dL


 


Albumin    3.1 L  (3.5-5.0)  g/dL


 


Urine Glucose (UA)     (Negative)  


 


Ur Leukocyte Esterase     (Negative)  


 


Urine WBC     (0-5)  /hpf


 


Urine Bacteria     (None)  /hpf


 


Urine Mucus     (None)  /hpf














  08/05/20 08/05/20 Range/Units





  06:31 11:34 


 


RBC  3.65 L   (3.80-5.40)  m/uL


 


Hgb  11.1 L   (11.4-16.0)  gm/dL


 


ESR    (0-20)  mm/hr


 


Carbon Dioxide    (22-30)  mmol/L


 


BUN    (7-17)  mg/dL


 


Creatinine    (0.52-1.04)  mg/dL


 


Glucose    (74-99)  mg/dL


 


POC Glucose (mg/dL)   244 H  (75-99)  mg/dL


 


C-Reactive Protein    (<10.0)  mg/L


 


Total Protein    (6.3-8.2)  g/dL


 


Albumin    (3.5-5.0)  g/dL


 


Urine Glucose (UA)    (Negative)  


 


Ur Leukocyte Esterase    (Negative)  


 


Urine WBC    (0-5)  /hpf


 


Urine Bacteria    (None)  /hpf


 


Urine Mucus    (None)  /hpf








                      Microbiology - Last 24 Hours (Table)











 08/04/20 17:23 Urine Culture - Preliminary





 Urine,Voided 








                                 Diabetes panel











  08/04/20 08/05/20 Range/Units





  15:56 06:28 


 


Sodium  139  143  (137-145)  mmol/L


 


Potassium  4.1  3.5  (3.5-5.1)  mmol/L


 


Chloride  103  106  ()  mmol/L


 


Carbon Dioxide  27  31 H  (22-30)  mmol/L


 


BUN  32 H  28 H  (7-17)  mg/dL


 


Creatinine  1.10 H  1.45 H  (0.52-1.04)  mg/dL


 


Glucose  174 H  128 H  (74-99)  mg/dL


 


Calcium  9.7  8.9  (8.4-10.2)  mg/dL


 


AST  36  22  (14-36)  U/L


 


ALT  18  14  (4-34)  U/L


 


Alkaline Phosphatase  64  69  ()  U/L


 


Total Protein  6.5  5.6 L  (6.3-8.2)  g/dL


 


Albumin  3.6  3.1 L  (3.5-5.0)  g/dL








                                  Calcium panel











  08/04/20 08/05/20 Range/Units





  15:56 06:28 


 


Calcium  9.7  8.9  (8.4-10.2)  mg/dL


 


Albumin  3.6  3.1 L  (3.5-5.0)  g/dL








                                 Pituitary panel











  08/04/20 08/05/20 Range/Units





  15:56 06:28 


 


Sodium  139  143  (137-145)  mmol/L


 


Potassium  4.1  3.5  (3.5-5.1)  mmol/L


 


Chloride  103  106  ()  mmol/L


 


Carbon Dioxide  27  31 H  (22-30)  mmol/L


 


BUN  32 H  28 H  (7-17)  mg/dL


 


Creatinine  1.10 H  1.45 H  (0.52-1.04)  mg/dL


 


Glucose  174 H  128 H  (74-99)  mg/dL


 


Calcium  9.7  8.9  (8.4-10.2)  mg/dL








                                  Adrenal panel











  08/04/20 08/05/20 Range/Units





  15:56 06:28 


 


Sodium  139  143  (137-145)  mmol/L


 


Potassium  4.1  3.5  (3.5-5.1)  mmol/L


 


Chloride  103  106  ()  mmol/L


 


Carbon Dioxide  27  31 H  (22-30)  mmol/L


 


BUN  32 H  28 H  (7-17)  mg/dL


 


Creatinine  1.10 H  1.45 H  (0.52-1.04)  mg/dL


 


Glucose  174 H  128 H  (74-99)  mg/dL


 


Calcium  9.7  8.9  (8.4-10.2)  mg/dL


 


Total Bilirubin  1.0  1.0  (0.2-1.3)  mg/dL


 


AST  36  22  (14-36)  U/L


 


ALT  18  14  (4-34)  U/L


 


Alkaline Phosphatase  64  69  ()  U/L


 


Total Protein  6.5  5.6 L  (6.3-8.2)  g/dL


 


Albumin  3.6  3.1 L  (3.5-5.0)  g/dL

## 2020-08-06 LAB
ALBUMIN SERPL-MCNC: 2.7 G/DL (ref 3.5–5)
ALP SERPL-CCNC: 57 U/L (ref 38–126)
ALT SERPL-CCNC: 17 U/L (ref 4–34)
ANION GAP SERPL CALC-SCNC: 6 MMOL/L
AST SERPL-CCNC: 27 U/L (ref 14–36)
BASOPHILS # BLD AUTO: 0 K/UL (ref 0–0.2)
BASOPHILS NFR BLD AUTO: 0 %
BUN SERPL-SCNC: 28 MG/DL (ref 7–17)
CALCIUM SPEC-MCNC: 8.3 MG/DL (ref 8.4–10.2)
CHLORIDE SERPL-SCNC: 107 MMOL/L (ref 98–107)
CO2 SERPL-SCNC: 30 MMOL/L (ref 22–30)
EOSINOPHIL # BLD AUTO: 0.2 K/UL (ref 0–0.7)
EOSINOPHIL NFR BLD AUTO: 2 %
ERYTHROCYTE [DISTWIDTH] IN BLOOD BY AUTOMATED COUNT: 3.29 M/UL (ref 3.8–5.4)
ERYTHROCYTE [DISTWIDTH] IN BLOOD: 13.2 % (ref 11.5–15.5)
GLUCOSE BLD-MCNC: 111 MG/DL (ref 75–99)
GLUCOSE BLD-MCNC: 192 MG/DL (ref 75–99)
GLUCOSE BLD-MCNC: 246 MG/DL (ref 75–99)
GLUCOSE BLD-MCNC: 269 MG/DL (ref 75–99)
GLUCOSE SERPL-MCNC: 98 MG/DL (ref 74–99)
HCT VFR BLD AUTO: 31.3 % (ref 34–46)
HGB BLD-MCNC: 9.9 GM/DL (ref 11.4–16)
LYMPHOCYTES # SPEC AUTO: 1.2 K/UL (ref 1–4.8)
LYMPHOCYTES NFR SPEC AUTO: 16 %
MCH RBC QN AUTO: 30 PG (ref 25–35)
MCHC RBC AUTO-ENTMCNC: 31.6 G/DL (ref 31–37)
MCV RBC AUTO: 95 FL (ref 80–100)
MONOCYTES # BLD AUTO: 0.6 K/UL (ref 0–1)
MONOCYTES NFR BLD AUTO: 7 %
NEUTROPHILS # BLD AUTO: 5.7 K/UL (ref 1.3–7.7)
NEUTROPHILS NFR BLD AUTO: 73 %
PLATELET # BLD AUTO: 251 K/UL (ref 150–450)
POTASSIUM SERPL-SCNC: 3.2 MMOL/L (ref 3.5–5.1)
PROT SERPL-MCNC: 5 G/DL (ref 6.3–8.2)
SODIUM SERPL-SCNC: 143 MMOL/L (ref 137–145)
WBC # BLD AUTO: 7.8 K/UL (ref 3.8–10.6)

## 2020-08-06 RX ADMIN — HYDROCODONE BITARTRATE AND ACETAMINOPHEN PRN EACH: 5; 325 TABLET ORAL at 03:37

## 2020-08-06 RX ADMIN — POTASSIUM CHLORIDE SCH MEQ: 20 TABLET, EXTENDED RELEASE ORAL at 11:05

## 2020-08-06 RX ADMIN — AMPICILLIN SODIUM AND SULBACTAM SODIUM SCH MLS/HR: 2; 1 INJECTION, POWDER, FOR SOLUTION INTRAMUSCULAR; INTRAVENOUS at 05:56

## 2020-08-06 RX ADMIN — REPAGLINIDE SCH MG: 1 TABLET ORAL at 17:11

## 2020-08-06 RX ADMIN — GABAPENTIN SCH MG: 400 CAPSULE ORAL at 21:19

## 2020-08-06 RX ADMIN — FUROSEMIDE SCH MG: 40 TABLET ORAL at 17:11

## 2020-08-06 RX ADMIN — GABAPENTIN SCH MG: 400 CAPSULE ORAL at 17:11

## 2020-08-06 RX ADMIN — COLCHICINE SCH MG: 0.6 TABLET, FILM COATED ORAL at 21:18

## 2020-08-06 RX ADMIN — REPAGLINIDE SCH MG: 1 TABLET ORAL at 12:28

## 2020-08-06 RX ADMIN — INSULIN ASPART SCH: 100 INJECTION, SOLUTION INTRAVENOUS; SUBCUTANEOUS at 08:56

## 2020-08-06 RX ADMIN — HYDROCODONE BITARTRATE AND ACETAMINOPHEN PRN EACH: 5; 325 TABLET ORAL at 21:18

## 2020-08-06 RX ADMIN — AMPICILLIN SODIUM AND SULBACTAM SODIUM SCH MLS/HR: 2; 1 INJECTION, POWDER, FOR SOLUTION INTRAMUSCULAR; INTRAVENOUS at 17:10

## 2020-08-06 RX ADMIN — GABAPENTIN SCH MG: 400 CAPSULE ORAL at 08:59

## 2020-08-06 RX ADMIN — REPAGLINIDE SCH MG: 1 TABLET ORAL at 08:58

## 2020-08-06 RX ADMIN — FUROSEMIDE SCH MG: 40 TABLET ORAL at 08:59

## 2020-08-06 RX ADMIN — CEFAZOLIN SCH MLS/HR: 330 INJECTION, POWDER, FOR SOLUTION INTRAMUSCULAR; INTRAVENOUS at 08:59

## 2020-08-06 RX ADMIN — INSULIN ASPART SCH UNIT: 100 INJECTION, SOLUTION INTRAVENOUS; SUBCUTANEOUS at 17:10

## 2020-08-06 RX ADMIN — INSULIN ASPART SCH UNIT: 100 INJECTION, SOLUTION INTRAVENOUS; SUBCUTANEOUS at 12:26

## 2020-08-06 RX ADMIN — PIOGLITAZONE SCH MG: 15 TABLET ORAL at 09:00

## 2020-08-06 RX ADMIN — ATORVASTATIN CALCIUM SCH MG: 40 TABLET, FILM COATED ORAL at 21:19

## 2020-08-06 RX ADMIN — INSULIN ASPART SCH UNIT: 100 INJECTION, SOLUTION INTRAVENOUS; SUBCUTANEOUS at 22:07

## 2020-08-06 RX ADMIN — POTASSIUM CHLORIDE SCH MEQ: 20 TABLET, EXTENDED RELEASE ORAL at 12:26

## 2020-08-06 NOTE — PN
PROGRESS NOTE



She has been kept another 24 hours for IV Unasyn and antibiotics.  We treated for gout

in the right toe after she has swelling at the MTP joint of the great toe on the left

foot.  She has a lot amount of swelling in her legs for which she will need some Lasix

to get that off.  She is told to elevate her legs.  Continue with broad-spectrum

antibiotics.  We will do a dressing change.



Sodium 143, potassium 3.2, BUN is 28, creatinine 1.50, glucose mid 100s to 200s, uric

acid high at 8.



ASSESSMENT:

1. Acute gout right MTP joint status post toe removal.

2. Some mild cellulitis of the foot.

All Colcrys, start tonight and continue with IV antibiotics.  Possible discharge home

tomorrow.  She has acute gout attack of the great toe of the left foot.





MMODL / IJN: [id number] / Job#: [id number]

## 2020-08-06 NOTE — PN
PROGRESS NOTE



DATE OF SERVICE:

08/06/2020



REASON FOR FOLLOWUP:

Left big toe cellulitis.



INTERVAL HISTORY:

Patient is currently afebrile, she is breathing comfortably. Overall pain and

discomfort to the left big toe has decreased. Denies any chest pain or cough.  No

abdominal pain, no diarrhea.



PHYSICAL EXAMINATION:

Blood pressure 135/68 with a pulse of 66, temperature 99, she is 95% on 2 L nasal

cannula.

General description is an elderly female, lying in bed in no distress.

RESPIRATORY SYSTEM: Unlabored breathing.

Examination of the left big toe swelling, redness has decreased.  No drainage was

noticed.



LABS:

Hemoglobin 9.8, white count 7.2, BUN of 28, creatinine 1.50.



DIAGNOSTIC IMPRESSION AND PLAN:

Patient with left big toe cellulitis, most recent removal of the toenail.  The patient

has clinically responded Unasyn.  She had been kept on IV Zosyn for another 24 before

transition to oral.  However, the patient has been switched to p.o. by the attending

physician.  Local care to continue with Aquacel Silver dressing.  No further

recommendation.





MMODL / IJN: [id number] / Job#: [id number]

## 2020-08-06 NOTE — P.PN
Subjective


Progress Note Date: 08/06/20





CHIEF COMPLAINT: Right upper quadrant abdominal pain





HISTORY OF PRESENT ILLNESS: Patient seen and examined with Dr. Heller.  She 

denies any abdominal pain.  Denies any nausea or vomiting.  Tolerating diet.  

She is afebrile.  White count 7.8





PHYSICAL EXAM: 


VITAL SIGNS: Reviewed.


GENERAL: Well-developed in no acute distress. 


HEENT:  No sclera icterus. Extraocular movements grossly intact.  Moist buccal 

mucosa. Head is atraumatic, normocephalic. 


ABDOMEN:  Soft.  Nondistended. Nontender. 


NEUROLOGIC: Alert and oriented. Cranial nerves II through XII grossly intact.





ASSESSMENT: 


1.  Abdominal pain likely secondary to chronic cholecystitis.  Pain has now 

improved.  Patient is tolerating diet











PLAN: 


No surgical intervention planned during this hospitalization.  Patient will 

follow-up with Dr. Heller for outpatient cholecystectomy once patient's toe 

cellulitis has improved.


We'll have patient follow-up with Dr. Heller in 1 week











Physician Assistant note has been reviewed by physician. Signing provider agrees

with the documented findings, assessment, and plan of care. 





Objective





- Vital Signs


Vital signs: 


                                   Vital Signs











Temp  99.0 F   08/06/20 09:00


 


Pulse  66   08/06/20 09:00


 


Resp  14   08/06/20 09:00


 


BP  135/68   08/06/20 09:00


 


Pulse Ox  95   08/06/20 09:00








                                 Intake & Output











 08/05/20 08/06/20 08/06/20





 18:59 06:59 18:59


 


Intake Total 220  480


 


Balance 220  480


 


Intake:   


 


  Oral 120  480


 


  Other 100  


 


Other:   


 


  Voiding Method  Bedside Commode 





  Diaper 





  Incontinent 


 


  # Voids  1 


 


  # Bowel Movements  1 














- Labs


CBC & Chem 7: 


                                 08/06/20 06:28





                                 08/06/20 06:28


Labs: 


                  Abnormal Lab Results - Last 24 Hours (Table)











  08/05/20 08/05/20 08/05/20 Range/Units





  11:34 16:43 20:46 


 


RBC     (3.80-5.40)  m/uL


 


Hgb     (11.4-16.0)  gm/dL


 


Hct     (34.0-46.0)  %


 


Potassium     (3.5-5.1)  mmol/L


 


BUN     (7-17)  mg/dL


 


Creatinine     (0.52-1.04)  mg/dL


 


POC Glucose (mg/dL)  244 H  431 H  241 H  (75-99)  mg/dL


 


Calcium     (8.4-10.2)  mg/dL


 


Total Protein     (6.3-8.2)  g/dL


 


Albumin     (3.5-5.0)  g/dL














  08/06/20 08/06/20 08/06/20 Range/Units





  06:21 06:28 06:28 


 


RBC   3.29 L   (3.80-5.40)  m/uL


 


Hgb   9.9 L   (11.4-16.0)  gm/dL


 


Hct   31.3 L   (34.0-46.0)  %


 


Potassium    3.2 L  (3.5-5.1)  mmol/L


 


BUN    28 H  (7-17)  mg/dL


 


Creatinine    1.50 H  (0.52-1.04)  mg/dL


 


POC Glucose (mg/dL)  111 H    (75-99)  mg/dL


 


Calcium    8.3 L  (8.4-10.2)  mg/dL


 


Total Protein    5.0 L  (6.3-8.2)  g/dL


 


Albumin    2.7 L  (3.5-5.0)  g/dL








                      Microbiology - Last 24 Hours (Table)











 08/04/20 17:52 Blood Culture - Preliminary





 Blood    No Growth after 24 hours


 


 08/04/20 17:23 Urine Culture - Final





 Urine,Voided

## 2020-08-07 VITALS
TEMPERATURE: 97.4 F | RESPIRATION RATE: 16 BRPM | HEART RATE: 64 BPM | SYSTOLIC BLOOD PRESSURE: 117 MMHG | DIASTOLIC BLOOD PRESSURE: 68 MMHG

## 2020-08-07 LAB
ALBUMIN SERPL-MCNC: 3.6 G/DL (ref 3.5–5)
ALP SERPL-CCNC: 74 U/L (ref 38–126)
ALT SERPL-CCNC: 20 U/L (ref 4–34)
ANION GAP SERPL CALC-SCNC: 6 MMOL/L
AST SERPL-CCNC: 30 U/L (ref 14–36)
BASOPHILS # BLD AUTO: 0 K/UL (ref 0–0.2)
BASOPHILS NFR BLD AUTO: 0 %
BUN SERPL-SCNC: 29 MG/DL (ref 7–17)
CALCIUM SPEC-MCNC: 8.9 MG/DL (ref 8.4–10.2)
CHLORIDE SERPL-SCNC: 104 MMOL/L (ref 98–107)
CO2 SERPL-SCNC: 31 MMOL/L (ref 22–30)
EOSINOPHIL # BLD AUTO: 0.3 K/UL (ref 0–0.7)
EOSINOPHIL NFR BLD AUTO: 3 %
ERYTHROCYTE [DISTWIDTH] IN BLOOD BY AUTOMATED COUNT: 3.91 M/UL (ref 3.8–5.4)
ERYTHROCYTE [DISTWIDTH] IN BLOOD: 12.8 % (ref 11.5–15.5)
GLUCOSE BLD-MCNC: 117 MG/DL (ref 75–99)
GLUCOSE BLD-MCNC: 77 MG/DL (ref 75–99)
GLUCOSE SERPL-MCNC: 72 MG/DL (ref 74–99)
HCT VFR BLD AUTO: 37.3 % (ref 34–46)
HGB BLD-MCNC: 11.8 GM/DL (ref 11.4–16)
LYMPHOCYTES # SPEC AUTO: 1.5 K/UL (ref 1–4.8)
LYMPHOCYTES NFR SPEC AUTO: 17 %
MCH RBC QN AUTO: 30.3 PG (ref 25–35)
MCHC RBC AUTO-ENTMCNC: 31.7 G/DL (ref 31–37)
MCV RBC AUTO: 95.4 FL (ref 80–100)
MONOCYTES # BLD AUTO: 0.5 K/UL (ref 0–1)
MONOCYTES NFR BLD AUTO: 6 %
NEUTROPHILS # BLD AUTO: 6.3 K/UL (ref 1.3–7.7)
NEUTROPHILS NFR BLD AUTO: 72 %
PLATELET # BLD AUTO: 321 K/UL (ref 150–450)
POTASSIUM SERPL-SCNC: 3.8 MMOL/L (ref 3.5–5.1)
PROT SERPL-MCNC: 6.4 G/DL (ref 6.3–8.2)
SODIUM SERPL-SCNC: 141 MMOL/L (ref 137–145)
WBC # BLD AUTO: 8.7 K/UL (ref 3.8–10.6)

## 2020-08-07 RX ADMIN — GABAPENTIN SCH MG: 400 CAPSULE ORAL at 09:14

## 2020-08-07 RX ADMIN — INSULIN ASPART SCH: 100 INJECTION, SOLUTION INTRAVENOUS; SUBCUTANEOUS at 07:53

## 2020-08-07 RX ADMIN — POTASSIUM CHLORIDE SCH MEQ: 20 TABLET, EXTENDED RELEASE ORAL at 09:14

## 2020-08-07 RX ADMIN — REPAGLINIDE SCH MG: 1 TABLET ORAL at 08:10

## 2020-08-07 RX ADMIN — COLCHICINE SCH MG: 0.6 TABLET, FILM COATED ORAL at 09:15

## 2020-08-07 RX ADMIN — PIOGLITAZONE SCH MG: 15 TABLET ORAL at 09:14

## 2020-08-07 RX ADMIN — FUROSEMIDE SCH MG: 40 TABLET ORAL at 09:14

## 2020-08-07 RX ADMIN — CEFAZOLIN SCH: 330 INJECTION, POWDER, FOR SOLUTION INTRAMUSCULAR; INTRAVENOUS at 04:40

## 2020-08-07 RX ADMIN — POTASSIUM CHLORIDE SCH MEQ: 20 TABLET, EXTENDED RELEASE ORAL at 00:04

## 2020-08-07 RX ADMIN — AMPICILLIN SODIUM AND SULBACTAM SODIUM SCH MLS/HR: 2; 1 INJECTION, POWDER, FOR SOLUTION INTRAMUSCULAR; INTRAVENOUS at 06:19

## 2020-08-07 NOTE — P.PN
Subjective


Progress Note Date: 08/07/20





CHIEF COMPLAINT: Right upper quadrant abdominal pain





HISTORY OF PRESENT ILLNESS: Patient being followed for chronic cholecystitis.  

Patient denies any abdominal pain. Denies any nausea or vomiting.  Tolerating 

regular diet.  She is afebrile.  White count 8.7





PHYSICAL EXAM: 


VITAL SIGNS: Reviewed.


GENERAL: Well-developed in no acute distress. 


HEENT:  No sclera icterus. Extraocular movements grossly intact.  Moist buccal 

mucosa. Head is atraumatic, normocephalic. 


ABDOMEN:  Soft.  Nondistended. Nontender. 


NEUROLOGIC: Alert and oriented. Cranial nerves II through XII grossly intact.





ASSESSMENT: 


1.  Abdominal pain likely secondary to chronic cholecystitis.  Pain has now 

improved.  Patient is tolerating diet











PLAN: 


No surgical intervention planned during this hospitalization.  Patient will 

follow-up with Dr. Heller for outpatient cholecystectomy once patient's toe 

cellulitis has improved.


We'll have patient follow-up with Dr. Heller in 1 week











Physician Assistant note has been reviewed by physician. Signing provider agrees

with the documented findings, assessment, and plan of care. 





Objective





- Vital Signs


Vital signs: 


                                   Vital Signs











Temp  97.4 F L  08/07/20 09:00


 


Pulse  64   08/07/20 09:00


 


Resp  16   08/07/20 09:00


 


BP  117/68   08/07/20 09:00


 


Pulse Ox  91 L  08/07/20 09:00








                                 Intake & Output











 08/06/20 08/07/20 08/07/20





 18:59 06:59 18:59


 


Intake Total 1320  


 


Balance 1320  


 


Intake:   


 


  Intake, IV Titration 600  





  Amount   


 


    Sodium Chloride 0.9% 1, 600  





    000 ml @ 50 mls/hr IV .   





    Q20H ALYSE Rx#:[id number]   


 


  Oral 720  


 


Other:   


 


  Voiding Method  Bedside Commode Bedside Commode





  Diaper Diaper





  Incontinent Incontinent


 


  # Voids  2 














- Labs


CBC & Chem 7: 


                                 08/07/20 06:31





                                 08/07/20 06:31


Labs: 


                  Abnormal Lab Results - Last 24 Hours (Table)











  08/06/20 08/06/20 08/06/20 Range/Units





  06:28 11:28 16:25 


 


Carbon Dioxide     (22-30)  mmol/L


 


BUN     (7-17)  mg/dL


 


Creatinine     (0.52-1.04)  mg/dL


 


Glucose     (74-99)  mg/dL


 


POC Glucose (mg/dL)   192 H  269 H  (75-99)  mg/dL


 


Uric Acid  8.0 H    (3.7-7.4)  mg/dL














  08/06/20 08/07/20 08/07/20 Range/Units





  21:17 06:31 07:41 


 


Carbon Dioxide   31 H   (22-30)  mmol/L


 


BUN   29 H   (7-17)  mg/dL


 


Creatinine   1.42 H   (0.52-1.04)  mg/dL


 


Glucose   72 L   (74-99)  mg/dL


 


POC Glucose (mg/dL)  246 H   117 H  (75-99)  mg/dL


 


Uric Acid     (3.7-7.4)  mg/dL








                      Microbiology - Last 24 Hours (Table)











 08/04/20 17:52 Blood Culture - Preliminary





 Blood    No Growth after 48 hours

## 2020-08-12 ENCOUNTER — HOSPITAL ENCOUNTER (OUTPATIENT)
Dept: HOSPITAL 47 - 4SSUR | Age: 80
Setting detail: OBSERVATION
LOS: 2 days | Discharge: HOME | End: 2020-08-14
Attending: FAMILY MEDICINE | Admitting: FAMILY MEDICINE
Payer: MEDICARE

## 2020-08-12 DIAGNOSIS — Z91.14: ICD-10-CM

## 2020-08-12 DIAGNOSIS — L03.031: ICD-10-CM

## 2020-08-12 DIAGNOSIS — J44.1: ICD-10-CM

## 2020-08-12 DIAGNOSIS — I50.33: ICD-10-CM

## 2020-08-12 DIAGNOSIS — L03.032: ICD-10-CM

## 2020-08-12 DIAGNOSIS — M10.9: ICD-10-CM

## 2020-08-12 DIAGNOSIS — I11.0: Primary | ICD-10-CM

## 2020-08-12 DIAGNOSIS — L03.115: ICD-10-CM

## 2020-08-12 DIAGNOSIS — R41.3: ICD-10-CM

## 2020-08-12 DIAGNOSIS — L03.116: ICD-10-CM

## 2020-08-12 DIAGNOSIS — I08.3: ICD-10-CM

## 2020-08-12 LAB
ALBUMIN SERPL-MCNC: 3.4 G/DL (ref 3.5–5)
ALP SERPL-CCNC: 75 U/L (ref 38–126)
ALT SERPL-CCNC: 18 U/L (ref 4–34)
ANION GAP SERPL CALC-SCNC: 8 MMOL/L
AST SERPL-CCNC: 25 U/L (ref 14–36)
BASOPHILS # BLD AUTO: 0.1 K/UL (ref 0–0.2)
BASOPHILS NFR BLD AUTO: 1 %
BUN SERPL-SCNC: 30 MG/DL (ref 7–17)
CALCIUM SPEC-MCNC: 9 MG/DL (ref 8.4–10.2)
CHLORIDE SERPL-SCNC: 100 MMOL/L (ref 98–107)
CO2 SERPL-SCNC: 30 MMOL/L (ref 22–30)
EOSINOPHIL # BLD AUTO: 0.3 K/UL (ref 0–0.7)
EOSINOPHIL NFR BLD AUTO: 3 %
ERYTHROCYTE [DISTWIDTH] IN BLOOD BY AUTOMATED COUNT: 3.71 M/UL (ref 3.8–5.4)
ERYTHROCYTE [DISTWIDTH] IN BLOOD: 13.3 % (ref 11.5–15.5)
GLUCOSE BLD-MCNC: 115 MG/DL (ref 75–99)
GLUCOSE BLD-MCNC: 128 MG/DL (ref 75–99)
GLUCOSE BLD-MCNC: 177 MG/DL (ref 75–99)
GLUCOSE SERPL-MCNC: 134 MG/DL (ref 74–99)
HCT VFR BLD AUTO: 34.8 % (ref 34–46)
HGB BLD-MCNC: 11.2 GM/DL (ref 11.4–16)
LYMPHOCYTES # SPEC AUTO: 1.4 K/UL (ref 1–4.8)
LYMPHOCYTES NFR SPEC AUTO: 16 %
MCH RBC QN AUTO: 30.2 PG (ref 25–35)
MCHC RBC AUTO-ENTMCNC: 32.2 G/DL (ref 31–37)
MCV RBC AUTO: 93.8 FL (ref 80–100)
MONOCYTES # BLD AUTO: 0.7 K/UL (ref 0–1)
MONOCYTES NFR BLD AUTO: 8 %
NEUTROPHILS # BLD AUTO: 6.5 K/UL (ref 1.3–7.7)
NEUTROPHILS NFR BLD AUTO: 71 %
PLATELET # BLD AUTO: 324 K/UL (ref 150–450)
POTASSIUM SERPL-SCNC: 3.3 MMOL/L (ref 3.5–5.1)
PROT SERPL-MCNC: 5.9 G/DL (ref 6.3–8.2)
SODIUM SERPL-SCNC: 138 MMOL/L (ref 137–145)
WBC # BLD AUTO: 9.1 K/UL (ref 3.8–10.6)

## 2020-08-12 PROCEDURE — 83605 ASSAY OF LACTIC ACID: CPT

## 2020-08-12 PROCEDURE — 96376 TX/PRO/DX INJ SAME DRUG ADON: CPT

## 2020-08-12 PROCEDURE — 96366 THER/PROPH/DIAG IV INF ADDON: CPT

## 2020-08-12 PROCEDURE — 85025 COMPLETE CBC W/AUTO DIFF WBC: CPT

## 2020-08-12 PROCEDURE — 80053 COMPREHEN METABOLIC PANEL: CPT

## 2020-08-12 PROCEDURE — 83036 HEMOGLOBIN GLYCOSYLATED A1C: CPT

## 2020-08-12 PROCEDURE — 93306 TTE W/DOPPLER COMPLETE: CPT

## 2020-08-12 PROCEDURE — 71045 X-RAY EXAM CHEST 1 VIEW: CPT

## 2020-08-12 PROCEDURE — 96365 THER/PROPH/DIAG IV INF INIT: CPT

## 2020-08-12 PROCEDURE — 83880 ASSAY OF NATRIURETIC PEPTIDE: CPT

## 2020-08-12 PROCEDURE — 96375 TX/PRO/DX INJ NEW DRUG ADDON: CPT

## 2020-08-12 PROCEDURE — 84132 ASSAY OF SERUM POTASSIUM: CPT

## 2020-08-12 RX ADMIN — PIPERACILLIN AND TAZOBACTAM SCH MLS/HR: 3; .375 INJECTION, POWDER, FOR SOLUTION INTRAVENOUS at 21:17

## 2020-08-12 RX ADMIN — FUROSEMIDE SCH MG: 10 INJECTION, SOLUTION INTRAMUSCULAR; INTRAVENOUS at 13:44

## 2020-08-12 RX ADMIN — COLCHICINE SCH MG: 0.6 TABLET, FILM COATED ORAL at 14:42

## 2020-08-12 RX ADMIN — GABAPENTIN SCH MG: 400 CAPSULE ORAL at 21:16

## 2020-08-12 RX ADMIN — PIPERACILLIN AND TAZOBACTAM SCH MLS/HR: 3; .375 INJECTION, POWDER, FOR SOLUTION INTRAVENOUS at 13:47

## 2020-08-12 RX ADMIN — INSULIN ASPART SCH UNIT: 100 INJECTION, SOLUTION INTRAVENOUS; SUBCUTANEOUS at 21:17

## 2020-08-12 RX ADMIN — POTASSIUM CHLORIDE SCH MEQ: 20 TABLET, EXTENDED RELEASE ORAL at 22:35

## 2020-08-12 RX ADMIN — HYDROCODONE BITARTRATE AND ACETAMINOPHEN PRN EACH: 5; 325 TABLET ORAL at 22:35

## 2020-08-12 RX ADMIN — FUROSEMIDE SCH MG: 10 INJECTION, SOLUTION INTRAMUSCULAR; INTRAVENOUS at 21:17

## 2020-08-12 RX ADMIN — ATORVASTATIN CALCIUM SCH MG: 40 TABLET, FILM COATED ORAL at 21:16

## 2020-08-12 RX ADMIN — POTASSIUM CHLORIDE SCH MEQ: 20 TABLET, EXTENDED RELEASE ORAL at 21:16

## 2020-08-12 NOTE — XR
EXAMINATION TYPE: XR chest 1V portable

 

DATE OF EXAM: 8/12/2020

 

Comparison: 9/24/2019

 

Clinical History: 79 year-old female COPD

 

Findings:

Heart mildly enlarged. Atherosclerotic arch calcifications. Mild patchy right basilar opacity. Upper 
and mid lungs appear clear. No sizable effusion. Mild hyperinflation. Chronic full-thickness rotator 
cuff tear left shoulder.

 

 

Impression:

Mild cardiomegaly and suspected underlying COPD. There is mild patchy atelectasis versus early infilt
rate at the peripheral right base.

## 2020-08-13 LAB
ALBUMIN SERPL-MCNC: 3.4 G/DL (ref 3.5–5)
ALP SERPL-CCNC: 73 U/L (ref 38–126)
ALT SERPL-CCNC: 18 U/L (ref 4–34)
ANION GAP SERPL CALC-SCNC: 7 MMOL/L
AST SERPL-CCNC: 28 U/L (ref 14–36)
BASOPHILS # BLD AUTO: 0.1 K/UL (ref 0–0.2)
BASOPHILS NFR BLD AUTO: 1 %
BUN SERPL-SCNC: 28 MG/DL (ref 7–17)
CALCIUM SPEC-MCNC: 8.7 MG/DL (ref 8.4–10.2)
CHLORIDE SERPL-SCNC: 104 MMOL/L (ref 98–107)
CO2 SERPL-SCNC: 33 MMOL/L (ref 22–30)
EOSINOPHIL # BLD AUTO: 0.4 K/UL (ref 0–0.7)
EOSINOPHIL NFR BLD AUTO: 5 %
ERYTHROCYTE [DISTWIDTH] IN BLOOD BY AUTOMATED COUNT: 3.88 M/UL (ref 3.8–5.4)
ERYTHROCYTE [DISTWIDTH] IN BLOOD: 13.3 % (ref 11.5–15.5)
GLUCOSE BLD-MCNC: 109 MG/DL (ref 75–99)
GLUCOSE BLD-MCNC: 114 MG/DL (ref 75–99)
GLUCOSE BLD-MCNC: 114 MG/DL (ref 75–99)
GLUCOSE BLD-MCNC: 159 MG/DL (ref 75–99)
GLUCOSE SERPL-MCNC: 137 MG/DL (ref 74–99)
HBA1C MFR BLD: 8.4 % (ref 4–6)
HCT VFR BLD AUTO: 37 % (ref 34–46)
HGB BLD-MCNC: 11.4 GM/DL (ref 11.4–16)
LYMPHOCYTES # SPEC AUTO: 1.5 K/UL (ref 1–4.8)
LYMPHOCYTES NFR SPEC AUTO: 22 %
MCH RBC QN AUTO: 29.3 PG (ref 25–35)
MCHC RBC AUTO-ENTMCNC: 30.8 G/DL (ref 31–37)
MCV RBC AUTO: 95.3 FL (ref 80–100)
MONOCYTES # BLD AUTO: 0.6 K/UL (ref 0–1)
MONOCYTES NFR BLD AUTO: 9 %
NEUTROPHILS # BLD AUTO: 4.2 K/UL (ref 1.3–7.7)
NEUTROPHILS NFR BLD AUTO: 61 %
PLATELET # BLD AUTO: 328 K/UL (ref 150–450)
POTASSIUM SERPL-SCNC: 4.4 MMOL/L (ref 3.5–5.1)
PROT SERPL-MCNC: 6.1 G/DL (ref 6.3–8.2)
SODIUM SERPL-SCNC: 144 MMOL/L (ref 137–145)
WBC # BLD AUTO: 7 K/UL (ref 3.8–10.6)

## 2020-08-13 RX ADMIN — FUROSEMIDE SCH MG: 10 INJECTION, SOLUTION INTRAMUSCULAR; INTRAVENOUS at 07:17

## 2020-08-13 RX ADMIN — GABAPENTIN SCH MG: 400 CAPSULE ORAL at 14:50

## 2020-08-13 RX ADMIN — REPAGLINIDE SCH MG: 1 TABLET ORAL at 17:02

## 2020-08-13 RX ADMIN — ATORVASTATIN CALCIUM SCH MG: 40 TABLET, FILM COATED ORAL at 20:32

## 2020-08-13 RX ADMIN — PIOGLITAZONE SCH MG: 15 TABLET ORAL at 07:17

## 2020-08-13 RX ADMIN — INSULIN ASPART SCH: 100 INJECTION, SOLUTION INTRAVENOUS; SUBCUTANEOUS at 11:40

## 2020-08-13 RX ADMIN — GABAPENTIN SCH MG: 400 CAPSULE ORAL at 07:15

## 2020-08-13 RX ADMIN — HYDROCODONE BITARTRATE AND ACETAMINOPHEN PRN EACH: 5; 325 TABLET ORAL at 07:19

## 2020-08-13 RX ADMIN — POTASSIUM CHLORIDE SCH MEQ: 20 TABLET, EXTENDED RELEASE ORAL at 05:57

## 2020-08-13 RX ADMIN — INSULIN ASPART SCH UNIT: 100 INJECTION, SOLUTION INTRAVENOUS; SUBCUTANEOUS at 17:02

## 2020-08-13 RX ADMIN — HYDROCODONE BITARTRATE AND ACETAMINOPHEN PRN EACH: 5; 325 TABLET ORAL at 17:04

## 2020-08-13 RX ADMIN — GABAPENTIN SCH MG: 400 CAPSULE ORAL at 23:16

## 2020-08-13 RX ADMIN — CHOLECALCIFEROL TAB 10 MCG (400 UNIT) SCH UNIT: 10 TAB at 07:16

## 2020-08-13 RX ADMIN — PIPERACILLIN AND TAZOBACTAM SCH MLS/HR: 3; .375 INJECTION, POWDER, FOR SOLUTION INTRAVENOUS at 20:32

## 2020-08-13 RX ADMIN — INSULIN ASPART SCH: 100 INJECTION, SOLUTION INTRAVENOUS; SUBCUTANEOUS at 20:32

## 2020-08-13 RX ADMIN — REPAGLINIDE SCH MG: 1 TABLET ORAL at 11:42

## 2020-08-13 RX ADMIN — FUROSEMIDE SCH MG: 10 INJECTION, SOLUTION INTRAMUSCULAR; INTRAVENOUS at 20:33

## 2020-08-13 RX ADMIN — PIPERACILLIN AND TAZOBACTAM SCH MLS/HR: 3; .375 INJECTION, POWDER, FOR SOLUTION INTRAVENOUS at 07:18

## 2020-08-13 RX ADMIN — POTASSIUM CHLORIDE SCH MEQ: 20 TABLET, EXTENDED RELEASE ORAL at 04:30

## 2020-08-13 RX ADMIN — INSULIN ASPART SCH: 100 INJECTION, SOLUTION INTRAVENOUS; SUBCUTANEOUS at 07:10

## 2020-08-13 RX ADMIN — COLCHICINE SCH MG: 0.6 TABLET, FILM COATED ORAL at 07:17

## 2020-08-13 RX ADMIN — REPAGLINIDE SCH MG: 1 TABLET ORAL at 07:16

## 2020-08-13 NOTE — HP
HISTORY AND PHYSICAL



A 79-year-old white female who was admitted with severe diastolic CHF with worsening

swelling in her legs as well as erythema in her legs and cellulitis of her legs.  She

is not compliant with her antibiotics that she was sent home last week, did not take

her antibiotics, did not take her colchicine for her gout, did not take oral

antibiotics for cellulitis of her leg.  Her legs are swollen like balloons.  She says

she went home.  Daughter does not think she has taken any of her medicines.



Chest x-ray shows COPD, possible infiltrate in the peripheral right base, started on IV

antibiotics, IV Lasix.  White count is normal. BUN is 28, creatinine is 1.49, CO2 is

33, glucose is mid 100s.  Albumin 3.4.



14 POINT REVIEW OF SYSTEMS:

Negative except for as mentioned in HPI.



PAST MEDICAL HISTORY:

Significant for hypertension, COPD, CHF, diastolic CHF, cellulitis of the legs, gout.



PHYSICAL EXAM:

Blood pressure is 116 to 130s/50s to 60s, O2 is 96 on 2 L, temperature 97.8, pulse is

mid 80s, respiratory is 16 to 20.

CARDIOVASCULAR:  S1, S2.

LUNGS:  Rales at the bases.

HEMATOLOGY:  Shows 2 to 3+ pedal edema, bilaterally, cellulitis of the knee down

bilateral legs, tenderness to palpation metatarsophalangeal joint of the great toes.

Daughter says she has been noncompliant with the medications.



ASSESSMENT:

Acute diastolic CHF, acute on chronic diastolic CHF, COPD exacerbation, cellulitis of

the legs down to the great toes, diabetes mellitus, hypertension, possible dementia

starting with memory loss.  Please see further orders.



Continue with IV Lasix, IV antibiotics, and monitor for 24 hours.





MMODL / IJN: [id number] / Job#: [id number]

## 2020-08-14 VITALS
TEMPERATURE: 97.7 F | DIASTOLIC BLOOD PRESSURE: 60 MMHG | HEART RATE: 85 BPM | RESPIRATION RATE: 18 BRPM | SYSTOLIC BLOOD PRESSURE: 127 MMHG

## 2020-08-14 LAB
GLUCOSE BLD-MCNC: 110 MG/DL (ref 75–99)
GLUCOSE BLD-MCNC: 200 MG/DL (ref 75–99)

## 2020-08-14 RX ADMIN — INSULIN ASPART SCH UNIT: 100 INJECTION, SOLUTION INTRAVENOUS; SUBCUTANEOUS at 11:47

## 2020-08-14 RX ADMIN — PIPERACILLIN AND TAZOBACTAM SCH MLS/HR: 3; .375 INJECTION, POWDER, FOR SOLUTION INTRAVENOUS at 07:15

## 2020-08-14 RX ADMIN — CHOLECALCIFEROL TAB 10 MCG (400 UNIT) SCH UNIT: 10 TAB at 07:14

## 2020-08-14 RX ADMIN — GABAPENTIN SCH MG: 400 CAPSULE ORAL at 07:13

## 2020-08-14 RX ADMIN — REPAGLINIDE SCH MG: 1 TABLET ORAL at 07:14

## 2020-08-14 RX ADMIN — REPAGLINIDE SCH MG: 1 TABLET ORAL at 11:47

## 2020-08-14 RX ADMIN — HYDROCODONE BITARTRATE AND ACETAMINOPHEN PRN EACH: 5; 325 TABLET ORAL at 05:37

## 2020-08-14 RX ADMIN — PIOGLITAZONE SCH MG: 15 TABLET ORAL at 07:13

## 2020-08-14 RX ADMIN — FUROSEMIDE SCH MG: 10 INJECTION, SOLUTION INTRAMUSCULAR; INTRAVENOUS at 07:15

## 2020-08-14 RX ADMIN — INSULIN ASPART SCH: 100 INJECTION, SOLUTION INTRAVENOUS; SUBCUTANEOUS at 07:09

## 2020-08-14 RX ADMIN — COLCHICINE SCH MG: 0.6 TABLET, FILM COATED ORAL at 07:14

## 2020-08-14 NOTE — ECHOF
Referral Reason:chf



MEASUREMENTS

--------

HEIGHT: 157.5 cm

WEIGHT: 72.1 kg

BP: 113/59

RVIDd:   2.8 cm     (< 3.3)

IVSd:   1.5 cm     (0.6 - 1.1)

LVIDd:   3.9 cm     (3.9 - 5.3)

LVPWd:   1.3 cm     (0.6 - 1.1)

IVSs:   1.7 cm

LVIDs:   3.2 cm

LVPWs:   1.6 cm

LA Diam:   3.4 cm     (2.7 - 3.8)

LAESV Index (A-L):   21.27 ml/m

Ao Diam:   3.0 cm     (2.0 - 3.7)

AV Cusp:   1.6 cm     (1.5 - 2.6)

MV EXCURSION:   16.144 mm     (> 18.000)

MV EF SLOPE:   22 mm/s     (70 - 150)

EPSS:   0.7 cm

MV E Rishi:   1.05 m/s

MV DecT:   290 ms

MV A Rishi:   1.31 m/s

MV E/A Ratio:   0.81 

AR PHT:   773 ms

RAP:   5.00 mmHg

RVSP:   30.09 mmHg







FINDINGS

--------

Sinus rhythm.

This was a technically good study.

The left ventricular size is normal.   There is moderate concentric left ventricular hypertrophy.   O
verall left ventricular systolic function is normal with, an EF between 60 - 65 %.

The right ventricle is normal in size.

Normal LA  size by volume 22+/-6 ml/m2.

The right atrium is normal in size.

Interatrial and interventricular septum intact.

There is mild aortic valve sclerosis.   There is mild aortic regurgitation.

Mild mitral regurgitation is present.

Mild tricuspid regurgitation present.   Right ventricular systolic pressure is normal at < 35 mmHg.

Trace/mild (physiologic)  pulmonic regurgitation.

The aortic root size is normal.

Normal inferior vena cava with normal inspiratory collapse consistent with estimated right atrial pre
ssure of  5 mmHg.

There is a trivial pericardial effusion present.



CONCLUSIONS

--------

1. The left ventricular size is normal.

2. There is moderate concentric left ventricular hypertrophy.

3. Overall left ventricular systolic function is normal with, an EF between 60 - 65 %.

4. There is mild aortic valve sclerosis.

5. There is mild aortic regurgitation.

6. Mild mitral regurgitation is present.

7. Mild tricuspid regurgitation present.

8. Trace/mild (physiologic)  pulmonic regurgitation.

9. There is a trivial pericardial effusion present.





SONOGRAPHER: YARITZA Hassan

## 2020-09-08 ENCOUNTER — HOSPITAL ENCOUNTER (OUTPATIENT)
Dept: HOSPITAL 47 - ORPAIN | Age: 80
Discharge: HOME | End: 2020-09-08
Attending: ANESTHESIOLOGY
Payer: MEDICARE

## 2020-09-08 VITALS — BODY MASS INDEX: 29.2 KG/M2

## 2020-09-08 VITALS — TEMPERATURE: 97.5 F

## 2020-09-08 VITALS — DIASTOLIC BLOOD PRESSURE: 63 MMHG | SYSTOLIC BLOOD PRESSURE: 139 MMHG | HEART RATE: 54 BPM

## 2020-09-08 VITALS — RESPIRATION RATE: 16 BRPM

## 2020-09-08 DIAGNOSIS — M51.36: ICD-10-CM

## 2020-09-08 DIAGNOSIS — M53.3: Primary | ICD-10-CM

## 2020-09-08 DIAGNOSIS — M46.1: ICD-10-CM

## 2020-09-08 DIAGNOSIS — E11.9: ICD-10-CM

## 2020-09-08 LAB
GLUCOSE BLD-MCNC: 83 MG/DL (ref 75–99)
GLUCOSE BLD-MCNC: 90 MG/DL (ref 75–99)

## 2020-09-08 PROCEDURE — 27096 INJECT SACROILIAC JOINT: CPT

## 2020-09-08 NOTE — FL
EXAMINATION TYPE: FL guided pain mgmt statistic

 

DATE OF EXAM: 9/8/2020

 

CLINICAL HISTORY: Left sacroiliac joint pain.

 

TECHNIQUE: Fluoroscopy.

 

COMPARISON:  None.

 

FINDINGS:  Fluoroscopic guidance was provided during pain relief procedure performed by Dr. Demarco 
.  A total of 6 seconds of fluoroscopic time was utilized during the procedure and 1 spot image is ac
quired. Single image acquired shows needle localization  at level of the left sacroiliac joint inferi
samara.

 

IMPRESSION:  As Above.

## 2020-09-08 NOTE — DS
DISCHARGE SUMMARY



DATE OF ADMISSION:

08/12/2020



DATE OF DISCHARGE:

08/14/2020



DISCHARGE MEDICATIONS:

1. Zocor 80 mg daily.

2. Prednisone 10 mg daily.

3. Coreg 12.5 b.i.d.

4. Vitamin D 400 units daily.

5. Actos 15 mg daily.

6. Prandin 1 mg a.c. t.i.d.

7. Neurontin 400 mg t.i.d.

8. Augmentin 875 q.12 hours.

9. Norco 5/325 q.8.

10.Lasix 40 mg b.i.d.

11.Tylenol 500 q.6h p.r.n.

12.Trulicity 0.75 mg subcu weekly.

13.Colchicine 0.6 mg daily.

The patient was admitted with diabetic wound infection of the leg.  Given IV

antibiotics, Zosyn, switched over to Augmentin on discharge.  She was treated for gout

with swelling and redness of the great toe, which improved her redness and swelling.

She was stabilized from medical standpoint.  Cleared by Infectious Disease at which

time she was sent home in stable condition.  Diabetic diet.  Follow up as tolerated in

a week.



CONDITION:

Stable.



PROGNOSIS:

Guarded.





MMODL / IJN: [id number] / Job#: [id number]

## 2020-09-22 ENCOUNTER — HOSPITAL ENCOUNTER (OUTPATIENT)
Dept: HOSPITAL 47 - ORPAIN | Age: 80
Discharge: HOME | End: 2020-09-22
Attending: STUDENT IN AN ORGANIZED HEALTH CARE EDUCATION/TRAINING PROGRAM
Payer: MEDICARE

## 2020-09-22 VITALS — HEART RATE: 65 BPM | DIASTOLIC BLOOD PRESSURE: 70 MMHG | SYSTOLIC BLOOD PRESSURE: 148 MMHG

## 2020-09-22 VITALS — RESPIRATION RATE: 16 BRPM | TEMPERATURE: 97.5 F

## 2020-09-22 DIAGNOSIS — E11.9: ICD-10-CM

## 2020-09-22 DIAGNOSIS — M46.1: ICD-10-CM

## 2020-09-22 DIAGNOSIS — M51.36: ICD-10-CM

## 2020-09-22 DIAGNOSIS — M53.3: Primary | ICD-10-CM

## 2020-09-22 LAB
GLUCOSE BLD-MCNC: 100 MG/DL (ref 75–99)
GLUCOSE BLD-MCNC: 95 MG/DL (ref 75–99)

## 2020-09-22 PROCEDURE — 27096 INJECT SACROILIAC JOINT: CPT

## 2020-09-22 PROCEDURE — 99152 MOD SED SAME PHYS/QHP 5/>YRS: CPT

## 2020-09-22 NOTE — FL
Fluoroscopy

 

INDICATION: Pain

 

FINDINGS:

 

Fluoroscopy time: 0.2 minutes

 

Images obtained: 1.

 

IMPRESSIONS:

1. Documentation of fluoroscopy.

## 2020-09-22 NOTE — P.PCN
Date of Procedure: 09/22/20


Description of Procedure: 


Procedure= left sacroiliac joints steroid injection under fluoroscopy guidance  

(fluoroscopy image stored on file in the radiology Department )


Preoperative diagnosis=  1-left sacroiliac joint dysfunction 2-lumbar 

degenerative disc disease . 


Postoperative diagnosis=Same as preop Diagnosis . 


Complication = none  


Condition= stable


Anesthesia= moderate sedation with intravenous Versed and fentanyl.  sedation 

time 7 min


Indication for the procedure= patient complaining of low back pain , examination

was positive for severe tenderness over the sacroiliac joints bilaterally and 

patient diagnosed with sacroiliitis, for this reason, she was good candidate for

sacroiliac joint steroid injection.  


Description of the procedure= procedure risk and benefits discussed with the 

patient, including but not limited, risk of infection and bleeding, and ALLERGIC

reaction to the medication and not complete pain relief and patient agreed with 

the preceding patient taken to the operating room, placed in prone position or 

standard monitors applied to the patient then after induction of anesthesia back

prepped with chlorhexidine 3 times ,


Then the left sacroiliac joint steroid injection done under strict sterile 

technique local infiltration of the skin and subcu interstitial at the location 

of the left sacroiliac joint then a 22-gauge Quincke Needle advanced slowly 

under fluoroscopy time placed in the left sacroiliac joint, needle placement 

confirmed with AP and oblique and lateral view then after appropriate needle 

placement confirmed and after negative aspiration 0.5% ropivacaine 1 mL and 40 

mg of Kenalog injected in the left sacroiliac joint after negative aspiration 

patient tolerated the procedure well that any complications and she will follow 

up in clinic 3 weeks

## 2020-10-08 ENCOUNTER — HOSPITAL ENCOUNTER (OUTPATIENT)
Dept: HOSPITAL 47 - RADXRMAIN | Age: 80
Discharge: HOME | End: 2020-10-08
Attending: FAMILY MEDICINE
Payer: MEDICARE

## 2020-10-08 DIAGNOSIS — M16.0: Primary | ICD-10-CM

## 2020-10-08 DIAGNOSIS — M85.88: ICD-10-CM

## 2020-10-08 PROCEDURE — 73521 X-RAY EXAM HIPS BI 2 VIEWS: CPT

## 2020-10-08 NOTE — XR
EXAMINATION TYPE: XR Hip Bilateral Complete

 

DATE OF EXAM: 10/8/2020

 

COMPARISON: None

 

HISTORY: Bursitis

 

TECHNIQUE:

 

FINDINGS: Femoral head articulates with the acetabulum. There is some hyperostosis superior to the ri
ght greater trochanter. Vascular calcification is present.

 

There is mild narrowing of the bilateral joint spaces. No acute fractures or dislocations are evident
.

 

IMPRESSION:

1.  Mild degenerative changes bilateral hips.

2. Some hyperostosis superior to the right greater trochanter.

## 2020-10-12 ENCOUNTER — HOSPITAL ENCOUNTER (OUTPATIENT)
Dept: HOSPITAL 47 - PNWHC3 | Age: 80
Discharge: HOME | End: 2020-10-12
Attending: ANESTHESIOLOGY
Payer: MEDICARE

## 2020-10-12 VITALS
TEMPERATURE: 97.8 F | SYSTOLIC BLOOD PRESSURE: 134 MMHG | RESPIRATION RATE: 20 BRPM | HEART RATE: 82 BPM | DIASTOLIC BLOOD PRESSURE: 67 MMHG

## 2020-10-12 DIAGNOSIS — M70.62: ICD-10-CM

## 2020-10-12 DIAGNOSIS — M47.26: Primary | ICD-10-CM

## 2020-10-12 DIAGNOSIS — Z79.52: ICD-10-CM

## 2020-10-12 PROCEDURE — 99211 OFF/OP EST MAY X REQ PHY/QHP: CPT

## 2020-10-12 NOTE — P.PN
Subjective


Progress Note Date: 10/12/20


This is a 79-year-old lady with history of chronic lower back pain with 

radiation to the left lower extremity down to the left foot with numbness and 

tingling.  The patient had lumbar epidural steroid injection which did not help 

her pain much however she did have a sacroiliac injection on the left side which

helped her pain more.  She does feel pain in both hips also and she had 

bilateral hip plain x-ray which showed only mild degenerative changes.  The 

patient takes prednisone orally on a daily basis for history of arthritis and 

she does have history of diabetes.


  Patient denies new-onset weakness, bowel/bladder incontinence, or any other 

signs or symptoms of cauda equina syndrome. There are no signs of acute 

intoxication, and no indications of medication diversion or overuse.





In addition to above, 13-point review of systems is also negative for chest p

ain, shortness of breath, changes in vision, changes in hearing, new onset 

weakness, abdominal pain, diarrhea, extreme fatigue, malaise, fever, skin 

changes, homicidal or suicidal ideation, or bowel or bladder incontinence.





Vital Signs:  Reviewed in EMR


Gen:  AAOx3, NAD


HEENT:  PERRLA,hearing grossly normal


Pulm:  resp unlabored


Neck:  supple, trachea midline


Neuro exam of the lower extremities: Decreased muscle strength to 4 out of 5 

bilaterally in the lower extremities


Positive tenderness around the left greater trochanter





Neuro:  CN II-XII grossly intact,








Imaging:  Reviewed in EMR/chart





Assessment:


Left lumbar radiculopathy


Lumbar spondylosis without myelopathy


Left greater trochanter bursitis


Diabetes


Chronic use of steroids











Plan:





1. Explanation:  Opioid and psychological risk scores were reviewed.  Diagnoses,

prognoses, and multiple treatment options including but not limited to physical 

therapy, interventional therapies, adjuvant medical therapies, narcotic 

medication therapies, and surgery were discussed with the patient and all 

questions were answered to the patient's satisfaction.





2. Opioid agreement: Signed with the patient and the patient is warned not to 

use opioids while driving or before driving and not to combine opioids with 

benzodiazepines or alcohol.





3. Counseling:  The patient was counseled extensively on SMOKING CESSATION, BODY

MASS INDEX, EXERCISE.  Specifically, the patient was instructed regarding the 

importance of smoking cessation, obesity, and exercise in the context of both 

chronic pain and overall health.





4. Procedures:  The patient may benefit from getting left greater trochanter 

bursa steroid injection however because of her history of diabetes and her 

recent steroid injections plus the daily dose of prednisone that she takes I 

prefer to hold off at this point on doing steroid injection.





5. Consultations:  None





6. Investigations:  None





7. Medications:  None prescribed from our clinic





8. Disposition: Follow up in 4 weeks  





9.  Maps were reviewed and were appropriate.

















Objective





- Vital Signs


Vital signs: 


                                   Vital Signs











Temp  97.8 F   10/12/20 10:54


 


Pulse  82   10/12/20 10:54


 


Resp  20   10/12/20 10:54


 


BP  134/67   10/12/20 10:54


 


Pulse Ox  96   10/12/20 10:54

## 2020-12-10 ENCOUNTER — HOSPITAL ENCOUNTER (INPATIENT)
Dept: HOSPITAL 47 - EC | Age: 80
LOS: 3 days | Discharge: HOME HEALTH | DRG: 871 | End: 2020-12-13
Attending: FAMILY MEDICINE | Admitting: FAMILY MEDICINE
Payer: MEDICARE

## 2020-12-10 VITALS — BODY MASS INDEX: 29.2 KG/M2

## 2020-12-10 DIAGNOSIS — Z86.73: ICD-10-CM

## 2020-12-10 DIAGNOSIS — Z90.89: ICD-10-CM

## 2020-12-10 DIAGNOSIS — Z86.19: ICD-10-CM

## 2020-12-10 DIAGNOSIS — I71.4: ICD-10-CM

## 2020-12-10 DIAGNOSIS — E86.0: ICD-10-CM

## 2020-12-10 DIAGNOSIS — Z79.51: ICD-10-CM

## 2020-12-10 DIAGNOSIS — E11.40: ICD-10-CM

## 2020-12-10 DIAGNOSIS — Z98.818: ICD-10-CM

## 2020-12-10 DIAGNOSIS — Z87.01: ICD-10-CM

## 2020-12-10 DIAGNOSIS — M10.9: ICD-10-CM

## 2020-12-10 DIAGNOSIS — Z86.79: ICD-10-CM

## 2020-12-10 DIAGNOSIS — Z71.3: ICD-10-CM

## 2020-12-10 DIAGNOSIS — I50.32: ICD-10-CM

## 2020-12-10 DIAGNOSIS — R41.0: ICD-10-CM

## 2020-12-10 DIAGNOSIS — J43.9: ICD-10-CM

## 2020-12-10 DIAGNOSIS — E44.0: ICD-10-CM

## 2020-12-10 DIAGNOSIS — Z98.890: ICD-10-CM

## 2020-12-10 DIAGNOSIS — N39.0: ICD-10-CM

## 2020-12-10 DIAGNOSIS — Z98.41: ICD-10-CM

## 2020-12-10 DIAGNOSIS — Z90.710: ICD-10-CM

## 2020-12-10 DIAGNOSIS — K80.20: ICD-10-CM

## 2020-12-10 DIAGNOSIS — Z79.84: ICD-10-CM

## 2020-12-10 DIAGNOSIS — Z79.891: ICD-10-CM

## 2020-12-10 DIAGNOSIS — I11.0: ICD-10-CM

## 2020-12-10 DIAGNOSIS — Z91.81: ICD-10-CM

## 2020-12-10 DIAGNOSIS — N17.0: ICD-10-CM

## 2020-12-10 DIAGNOSIS — R09.02: ICD-10-CM

## 2020-12-10 DIAGNOSIS — M19.90: ICD-10-CM

## 2020-12-10 DIAGNOSIS — J18.9: ICD-10-CM

## 2020-12-10 DIAGNOSIS — I25.10: ICD-10-CM

## 2020-12-10 DIAGNOSIS — Z98.42: ICD-10-CM

## 2020-12-10 DIAGNOSIS — G89.29: ICD-10-CM

## 2020-12-10 DIAGNOSIS — Z87.42: ICD-10-CM

## 2020-12-10 DIAGNOSIS — Z87.891: ICD-10-CM

## 2020-12-10 DIAGNOSIS — Z83.3: ICD-10-CM

## 2020-12-10 DIAGNOSIS — A41.51: Primary | ICD-10-CM

## 2020-12-10 DIAGNOSIS — E78.5: ICD-10-CM

## 2020-12-10 DIAGNOSIS — Z79.899: ICD-10-CM

## 2020-12-10 DIAGNOSIS — I44.0: ICD-10-CM

## 2020-12-10 DIAGNOSIS — Z20.828: ICD-10-CM

## 2020-12-10 DIAGNOSIS — M54.5: ICD-10-CM

## 2020-12-10 LAB
ALBUMIN SERPL-MCNC: 3.4 G/DL (ref 3.5–5)
ALP SERPL-CCNC: 58 U/L (ref 38–126)
ALT SERPL-CCNC: 19 U/L (ref 4–34)
ANION GAP SERPL CALC-SCNC: 6 MMOL/L
APTT BLD: 22.1 SEC (ref 22–30)
AST SERPL-CCNC: 24 U/L (ref 14–36)
BASOPHILS # BLD AUTO: 0 K/UL (ref 0–0.2)
BASOPHILS NFR BLD AUTO: 1 %
BUN SERPL-SCNC: 24 MG/DL (ref 7–17)
CALCIUM SPEC-MCNC: 9 MG/DL (ref 8.4–10.2)
CHLORIDE SERPL-SCNC: 105 MMOL/L (ref 98–107)
CK SERPL-CCNC: 107 U/L (ref 30–135)
CO2 SERPL-SCNC: 29 MMOL/L (ref 22–30)
EOSINOPHIL # BLD AUTO: 0.1 K/UL (ref 0–0.7)
EOSINOPHIL NFR BLD AUTO: 2 %
ERYTHROCYTE [DISTWIDTH] IN BLOOD BY AUTOMATED COUNT: 4.24 M/UL (ref 3.8–5.4)
ERYTHROCYTE [DISTWIDTH] IN BLOOD: 13.2 % (ref 11.5–15.5)
GLUCOSE BLD-MCNC: 174 MG/DL (ref 75–99)
GLUCOSE BLD-MCNC: 202 MG/DL (ref 75–99)
GLUCOSE SERPL-MCNC: 195 MG/DL (ref 74–99)
HCT VFR BLD AUTO: 38.7 % (ref 34–46)
HGB BLD-MCNC: 13.2 GM/DL (ref 11.4–16)
INR PPP: 1 (ref ?–1.2)
LYMPHOCYTES # SPEC AUTO: 0.8 K/UL (ref 1–4.8)
LYMPHOCYTES NFR SPEC AUTO: 10 %
MAGNESIUM SPEC-SCNC: 2.1 MG/DL (ref 1.6–2.3)
MCH RBC QN AUTO: 31.1 PG (ref 25–35)
MCHC RBC AUTO-ENTMCNC: 34.1 G/DL (ref 31–37)
MCV RBC AUTO: 91.2 FL (ref 80–100)
MONOCYTES # BLD AUTO: 0.5 K/UL (ref 0–1)
MONOCYTES NFR BLD AUTO: 6 %
NEUTROPHILS # BLD AUTO: 6.2 K/UL (ref 1.3–7.7)
NEUTROPHILS NFR BLD AUTO: 81 %
PH UR: 6.5 [PH] (ref 5–8)
PLATELET # BLD AUTO: 192 K/UL (ref 150–450)
POTASSIUM SERPL-SCNC: 3.5 MMOL/L (ref 3.5–5.1)
PROT SERPL-MCNC: 5.9 G/DL (ref 6.3–8.2)
PT BLD: 10.2 SEC (ref 9–12)
RBC UR QL: 2 /HPF (ref 0–5)
SODIUM SERPL-SCNC: 140 MMOL/L (ref 137–145)
SP GR UR: 1.01 (ref 1–1.03)
UROBILINOGEN UR QL STRIP: <2 MG/DL (ref ?–2)
WBC # BLD AUTO: 7.7 K/UL (ref 3.8–10.6)
WBC # UR AUTO: 116 /HPF (ref 0–5)

## 2020-12-10 PROCEDURE — 87635 SARS-COV-2 COVID-19 AMP PRB: CPT

## 2020-12-10 PROCEDURE — 71250 CT THORAX DX C-: CPT

## 2020-12-10 PROCEDURE — 71046 X-RAY EXAM CHEST 2 VIEWS: CPT

## 2020-12-10 PROCEDURE — 93005 ELECTROCARDIOGRAM TRACING: CPT

## 2020-12-10 PROCEDURE — 36415 COLL VENOUS BLD VENIPUNCTURE: CPT

## 2020-12-10 PROCEDURE — 87186 SC STD MICRODIL/AGAR DIL: CPT

## 2020-12-10 PROCEDURE — 94760 N-INVAS EAR/PLS OXIMETRY 1: CPT

## 2020-12-10 PROCEDURE — 96375 TX/PRO/DX INJ NEW DRUG ADDON: CPT

## 2020-12-10 PROCEDURE — 70450 CT HEAD/BRAIN W/O DYE: CPT

## 2020-12-10 PROCEDURE — 80306 DRUG TEST PRSMV INSTRMNT: CPT

## 2020-12-10 PROCEDURE — 82140 ASSAY OF AMMONIA: CPT

## 2020-12-10 PROCEDURE — 96365 THER/PROPH/DIAG IV INF INIT: CPT

## 2020-12-10 PROCEDURE — 85730 THROMBOPLASTIN TIME PARTIAL: CPT

## 2020-12-10 PROCEDURE — 99285 EMERGENCY DEPT VISIT HI MDM: CPT

## 2020-12-10 PROCEDURE — 87077 CULTURE AEROBIC IDENTIFY: CPT

## 2020-12-10 PROCEDURE — 80053 COMPREHEN METABOLIC PANEL: CPT

## 2020-12-10 PROCEDURE — 87086 URINE CULTURE/COLONY COUNT: CPT

## 2020-12-10 PROCEDURE — 85025 COMPLETE CBC W/AUTO DIFF WBC: CPT

## 2020-12-10 PROCEDURE — 82550 ASSAY OF CK (CPK): CPT

## 2020-12-10 PROCEDURE — 84484 ASSAY OF TROPONIN QUANT: CPT

## 2020-12-10 PROCEDURE — 96361 HYDRATE IV INFUSION ADD-ON: CPT

## 2020-12-10 PROCEDURE — 93880 EXTRACRANIAL BILAT STUDY: CPT

## 2020-12-10 PROCEDURE — 85610 PROTHROMBIN TIME: CPT

## 2020-12-10 PROCEDURE — 83735 ASSAY OF MAGNESIUM: CPT

## 2020-12-10 PROCEDURE — 87040 BLOOD CULTURE FOR BACTERIA: CPT

## 2020-12-10 PROCEDURE — 71045 X-RAY EXAM CHEST 1 VIEW: CPT

## 2020-12-10 PROCEDURE — 81001 URINALYSIS AUTO W/SCOPE: CPT

## 2020-12-10 RX ADMIN — GABAPENTIN SCH MG: 300 CAPSULE ORAL at 16:21

## 2020-12-10 RX ADMIN — REPAGLINIDE SCH MG: 1 TABLET ORAL at 18:04

## 2020-12-10 RX ADMIN — FUROSEMIDE SCH MG: 40 TABLET ORAL at 22:39

## 2020-12-10 RX ADMIN — FUROSEMIDE SCH MG: 40 TABLET ORAL at 16:21

## 2020-12-10 RX ADMIN — CEFAZOLIN SCH MLS/HR: 330 INJECTION, POWDER, FOR SOLUTION INTRAMUSCULAR; INTRAVENOUS at 14:47

## 2020-12-10 RX ADMIN — ATORVASTATIN CALCIUM SCH MG: 40 TABLET, FILM COATED ORAL at 20:57

## 2020-12-10 RX ADMIN — INSULIN ASPART SCH UNIT: 100 INJECTION, SOLUTION INTRAVENOUS; SUBCUTANEOUS at 18:03

## 2020-12-10 RX ADMIN — INSULIN ASPART SCH UNIT: 100 INJECTION, SOLUTION INTRAVENOUS; SUBCUTANEOUS at 20:57

## 2020-12-10 RX ADMIN — GABAPENTIN SCH MG: 300 CAPSULE ORAL at 22:39

## 2020-12-10 RX ADMIN — HYDROCODONE BITARTRATE AND ACETAMINOPHEN SCH EACH: 7.5; 325 TABLET ORAL at 20:54

## 2020-12-10 NOTE — ED
Medical Decision Making





- Lab Data


Result diagrams: 


                                 12/10/20 10:19





                                 12/10/20 10:19





                                   Lab Results











  12/10/20 12/10/20 12/10/20 Range/Units





  10:19 10:19 10:19 


 


WBC  7.7    (3.8-10.6)  k/uL


 


RBC  4.24    (3.80-5.40)  m/uL


 


Hgb  13.2    (11.4-16.0)  gm/dL


 


Hct  38.7    (34.0-46.0)  %


 


MCV  91.2    (80.0-100.0)  fL


 


MCH  31.1    (25.0-35.0)  pg


 


MCHC  34.1    (31.0-37.0)  g/dL


 


RDW  13.2    (11.5-15.5)  %


 


Plt Count  192    (150-450)  k/uL


 


MPV  8.2    


 


Neutrophils %  81    %


 


Lymphocytes %  10    %


 


Monocytes %  6    %


 


Eosinophils %  2    %


 


Basophils %  1    %


 


Neutrophils #  6.2    (1.3-7.7)  k/uL


 


Lymphocytes #  0.8 L    (1.0-4.8)  k/uL


 


Monocytes #  0.5    (0-1.0)  k/uL


 


Eosinophils #  0.1    (0-0.7)  k/uL


 


Basophils #  0.0    (0-0.2)  k/uL


 


PT   10.2   (9.0-12.0)  sec


 


INR   1.0   (<1.2)  


 


APTT   22.1   (22.0-30.0)  sec


 


Sodium    140  (137-145)  mmol/L


 


Potassium    3.5  (3.5-5.1)  mmol/L


 


Chloride    105  ()  mmol/L


 


Carbon Dioxide    29  (22-30)  mmol/L


 


Anion Gap    6  mmol/L


 


BUN    24 H  (7-17)  mg/dL


 


Creatinine    1.22 H  (0.52-1.04)  mg/dL


 


Est GFR (CKD-EPI)AfAm    49  (>60 ml/min/1.73 sqM)  


 


Est GFR (CKD-EPI)NonAf    42  (>60 ml/min/1.73 sqM)  


 


Glucose    195 H  (74-99)  mg/dL


 


Calcium    9.0  (8.4-10.2)  mg/dL


 


Magnesium    2.1  (1.6-2.3)  mg/dL


 


Total Bilirubin    0.6  (0.2-1.3)  mg/dL


 


AST    24  (14-36)  U/L


 


ALT    19  (4-34)  U/L


 


Alkaline Phosphatase    58  ()  U/L


 


Ammonia     (<30)  umol/L


 


Creatine Kinase    107  ()  U/L


 


Troponin I     (0.000-0.034)  ng/mL


 


Total Protein    5.9 L  (6.3-8.2)  g/dL


 


Albumin    3.4 L  (3.5-5.0)  g/dL


 


Urine Color     


 


Urine Appearance     (Clear)  


 


Urine pH     (5.0-8.0)  


 


Ur Specific Gravity     (1.001-1.035)  


 


Urine Protein     (Negative)  


 


Urine Glucose (UA)     (Negative)  


 


Urine Ketones     (Negative)  


 


Urine Blood     (Negative)  


 


Urine Nitrite     (Negative)  


 


Urine Bilirubin     (Negative)  


 


Urine Urobilinogen     (<2.0)  mg/dL


 


Ur Leukocyte Esterase     (Negative)  


 


Urine RBC     (0-5)  /hpf


 


Urine WBC     (0-5)  /hpf


 


Urine Bacteria     (None)  /hpf


 


Urine Mucus     (None)  /hpf


 


Urine Opiates Screen     (NotDetected)  


 


Ur Oxycodone Screen     (NotDetected)  


 


Urine Methadone Screen     (NotDetected)  


 


Ur Propoxyphene Screen     (NotDetected)  


 


Ur Barbiturates Screen     (NotDetected)  


 


U Tricyclic Antidepress     (NotDetected)  


 


Ur Phencyclidine Scrn     (NotDetected)  


 


Ur Amphetamines Screen     (NotDetected)  


 


U Methamphetamines Scrn     (NotDetected)  


 


U Benzodiazepines Scrn     (NotDetected)  


 


Urine Cocaine Screen     (NotDetected)  


 


U Marijuana (THC) Screen     (NotDetected)  














  12/10/20 12/10/20 12/10/20 Range/Units





  10:19 10:19 11:38 


 


WBC     (3.8-10.6)  k/uL


 


RBC     (3.80-5.40)  m/uL


 


Hgb     (11.4-16.0)  gm/dL


 


Hct     (34.0-46.0)  %


 


MCV     (80.0-100.0)  fL


 


MCH     (25.0-35.0)  pg


 


MCHC     (31.0-37.0)  g/dL


 


RDW     (11.5-15.5)  %


 


Plt Count     (150-450)  k/uL


 


MPV     


 


Neutrophils %     %


 


Lymphocytes %     %


 


Monocytes %     %


 


Eosinophils %     %


 


Basophils %     %


 


Neutrophils #     (1.3-7.7)  k/uL


 


Lymphocytes #     (1.0-4.8)  k/uL


 


Monocytes #     (0-1.0)  k/uL


 


Eosinophils #     (0-0.7)  k/uL


 


Basophils #     (0-0.2)  k/uL


 


PT     (9.0-12.0)  sec


 


INR     (<1.2)  


 


APTT     (22.0-30.0)  sec


 


Sodium     (137-145)  mmol/L


 


Potassium     (3.5-5.1)  mmol/L


 


Chloride     ()  mmol/L


 


Carbon Dioxide     (22-30)  mmol/L


 


Anion Gap     mmol/L


 


BUN     (7-17)  mg/dL


 


Creatinine     (0.52-1.04)  mg/dL


 


Est GFR (CKD-EPI)AfAm     (>60 ml/min/1.73 sqM)  


 


Est GFR (CKD-EPI)NonAf     (>60 ml/min/1.73 sqM)  


 


Glucose     (74-99)  mg/dL


 


Calcium     (8.4-10.2)  mg/dL


 


Magnesium     (1.6-2.3)  mg/dL


 


Total Bilirubin     (0.2-1.3)  mg/dL


 


AST     (14-36)  U/L


 


ALT     (4-34)  U/L


 


Alkaline Phosphatase     ()  U/L


 


Ammonia   <9   (<30)  umol/L


 


Creatine Kinase     ()  U/L


 


Troponin I  <0.012    (0.000-0.034)  ng/mL


 


Total Protein     (6.3-8.2)  g/dL


 


Albumin     (3.5-5.0)  g/dL


 


Urine Color    Light Yellow  


 


Urine Appearance    Cloudy H  (Clear)  


 


Urine pH    6.5  (5.0-8.0)  


 


Ur Specific Gravity    1.009  (1.001-1.035)  


 


Urine Protein    Negative  (Negative)  


 


Urine Glucose (UA)    Negative  (Negative)  


 


Urine Ketones    Negative  (Negative)  


 


Urine Blood    Negative  (Negative)  


 


Urine Nitrite    Negative  (Negative)  


 


Urine Bilirubin    Negative  (Negative)  


 


Urine Urobilinogen    <2.0  (<2.0)  mg/dL


 


Ur Leukocyte Esterase    Large H  (Negative)  


 


Urine RBC    2  (0-5)  /hpf


 


Urine WBC    116 H  (0-5)  /hpf


 


Urine Bacteria    Occasional H  (None)  /hpf


 


Urine Mucus    Rare H  (None)  /hpf


 


Urine Opiates Screen    Detected H  (NotDetected)  


 


Ur Oxycodone Screen    Not Detected  (NotDetected)  


 


Urine Methadone Screen    Not Detected  (NotDetected)  


 


Ur Propoxyphene Screen    Not Detected  (NotDetected)  


 


Ur Barbiturates Screen    Not Detected  (NotDetected)  


 


U Tricyclic Antidepress    Not Detected  (NotDetected)  


 


Ur Phencyclidine Scrn    Not Detected  (NotDetected)  


 


Ur Amphetamines Screen    Not Detected  (NotDetected)  


 


U Methamphetamines Scrn    Not Detected  (NotDetected)  


 


U Benzodiazepines Scrn    Not Detected  (NotDetected)  


 


Urine Cocaine Screen    Not Detected  (NotDetected)  


 


U Marijuana (THC) Screen    Not Detected  (NotDetected)  














Critical Care Time


Critical Care Time: Yes


Total Critical Care Time: 31


Critical Care Time: 





31 minutes included initial presentation with history physical labs x-rays 

multiple reevaluation the patient to responsive therapy discussion with patient 

and family members discuss with the admitting physician admission orders 

documentation the above





Disposition


Clinical Impression: 


 Right lower lobe pneumonia, Syncope and collapse, Dehydration, Acute 

confusional state





Disposition: ADMITTED AS IP TO THIS [facility]


Condition: Fair


Referrals: 


Poli Pate MD [Primary Care Provider] - 1-2 days

## 2020-12-10 NOTE — CT
EXAMINATION TYPE: CT brain wo con

 

DATE OF EXAM: 12/10/2020

 

HISTORY: Altered mental status.

 

CT DLP: 1113.4 mGycm.  Automated Exposure Control for Dose Reduction was Utilized.

 

TECHNIQUE: CT scan of the head is performed without contrast.

 

COMPARISON: CT brain June 5, 2019.

 

FINDINGS:   There is no acute intracranial hemorrhage or midline shift identified. There is diffuse v
entricular and sulcal prominence consistent with diffuse age-related cerebral atrophy.  There is low-
attenuation in the periventricular white matter consistent with chronic small vessel ischemic change.
 Low attenuation left parietal periventricular region axial image 37 similar to prior study. Low atte
nuation higher right parietal region near image 43 redemonstrated may be slightly more prominent from
 prior study. The globes are intact and the visualized sinuses are clear.  Vascular calcification dis
jessica internal carotid arteries bilaterally. 

 

IMPRESSION:  No acute intracranial hemorrhage or midline shift.  There is mild to moderate diffuse ag
e-related cerebral atrophy and moderate to severe chronic small vessel ischemic change redemonstrated
.  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 There is similar old infarct ri
ght parietal lobe redemonstrated. A subacute on chronic component at this level cannot be excluded.

## 2020-12-10 NOTE — XR
EXAMINATION TYPE: XR chest 2V

 

DATE OF EXAM: 12/10/2020

 

COMPARISON: 8/12/2020

 

INDICATION: Altered mental status

 

TECHNIQUE:  Frontal and lateral views of the chest are obtained.

 

FINDINGS:  

The heart size is likely prominent.  

The pulmonary vasculature is normal.

Mild infiltrate may be at the right lung base. Correlate for subsegmental atelectasis. Posterior cost
ophrenic angle blunting is present.  

Spondylosis within the thoracic spine.

 

IMPRESSION:  

1. Mild subsegmental atelectasis or infiltrate at the right lung base.

## 2020-12-10 NOTE — ED
General Adult HPI





- General


Chief complaint: Syncope


Stated complaint: Syncope


Time Seen by Provider: 12/10/20 10:13


Source: patient, EMS, RN notes reviewed


Mode of arrival: EMS


Limitations: no limitations





- History of Present Illness


Initial comments: 





79-year-old female who was out feeding her ribs stay which he had a syncopal 

episode.  She was brought in by EMS for evaluation she was altered initially 

upon arrival arrival and did respond he stated to fluids.  No evidence of trauma

no fevers chills nausea vomiting sweats no other history available





- Related Data


                                Home Medications











 Medication  Instructions  Recorded  Confirmed


 


Simvastatin [Zocor] 80 mg PO HS 09/22/14 12/10/20


 


predniSONE 10 mg PO DAILY 12/30/18 12/10/20


 


Carvedilol [Coreg] 12.5 mg PO BID 06/05/19 12/10/20


 


Pioglitazone [Actos] 15 mg PO DAILY 06/25/20 12/10/20


 


Repaglinide [Prandin] 1 mg PO AC-TID 06/25/20 12/10/20


 


Furosemide [Lasix] 40 mg PO TID 08/04/20 12/10/20


 


Colchicine 0.6 mg PO DAILY 08/12/20 12/10/20


 


Dulaglutide [Trulicity] 0.75 mg SQ TH 08/12/20 12/10/20


 


Gabapentin [Neurontin] 300 mg PO TID 12/10/20 12/10/20


 


HYDROcodone/APAP 7.5-325MG [Norco 1 tab PO BID 12/10/20 12/10/20





7.5-325]   











                                    Allergies











Allergy/AdvReac Type Severity Reaction Status Date / Time


 


No Known Allergies Allergy   Verified 12/10/20 13:52














Review of Systems


ROS Statement: 


Those systems with pertinent positive or pertinent negative responses have been 

documented in the HPI.





ROS Other: All systems not noted in ROS Statement are negative.


Limitations: ROS unobtainable due to patients medical condition





Past Medical History


Past Medical History: Heart Failure, Diabetes Mellitus, Hyperlipidemia, 

Hypertension, Memory Impairment, Pneumonia


Additional Past Medical History / Comment(s): hx cellulitis of gladys legs, gladys leg

edema,  gout, states recent "shot" in left knee for pain


History of Any Multi-Drug Resistant Organisms: None Reported


Past Surgical History: Hysterectomy, Tonsillectomy


Additional Past Surgical History / Comment(s): right leg vein stripping, pain 

clinic procedure


Past Anesthesia/Blood Transfusion Reactions: No Reported Reaction


Past Psychological History: No Psychological Hx Reported


Smoking Status: Former smoker


Past Alcohol Use History: None Reported


Past Drug Use History: None Reported





- Past Family History


  ** Father


Family Medical History: No Reported History





  ** Mother


Family Medical History: No Reported History





General Exam





- General Exam Comments


Initial Comments: 





Is a well-developed well-nourished awake though somewhat lethargic female


Limitations: no limitations


General appearance: alert, in no apparent distress


Head exam: Present: atraumatic, normocephalic, normal inspection


Eye exam: Present: normal appearance, PERRL, EOMI.  Absent: scleral icterus, 

conjunctival injection, periorbital swelling


ENT exam: Present: mucous membranes dry


Neck exam: Present: normal inspection.  Absent: tenderness, meningismus, lymph

adenopathy


Respiratory exam: Present: normal lung sounds bilaterally.  Absent: respiratory 

distress, wheezes, rales, rhonchi, stridor


Cardiovascular Exam: Present: normal rhythm, bradycardia, normal heart sounds.  

Absent: systolic murmur, diastolic murmur, rubs, gallop, clicks


GI/Abdominal exam: Present: soft, normal bowel sounds.  Absent: distended, 

tenderness, guarding, rebound, rigid


Extremities exam: Present: normal inspection, full ROM, normal capillary refill.

 Absent: tenderness, pedal edema, joint swelling, calf tenderness


Back exam: Present: normal inspection


Neurological exam: Present: alert, oriented X3, CN II-XII intact


Psychiatric exam: Present: normal affect, normal mood


Skin exam: Present: warm, dry, intact, normal color.  Absent: rash





Course


                                   Vital Signs











  12/10/20 12/10/20 12/10/20





  10:19 10:21 11:17


 


Temperature  97.8 F 


 


Pulse Rate 49 L 54 L 79


 


Respiratory 18 16 18





Rate   


 


Blood Pressure 106/48 138/60 138/55


 


O2 Sat by Pulse 97 96 95





Oximetry   














- Reevaluation(s)


Reevaluation #1: 





12/10/20 14:24


I did reevaluate patient several occasions she is much more alert after IV 

fluids.





EKG Findings





- EKG Results:


EKG: interpreted by SILVIA, sinus rhythm (Sinus rhythm a 61.  Interval to 16 QRS 

duration 84 QT//442 that exodeviation first-degree AV block no acute ST-T

wave changes)





Medical Decision Making





- Medical Decision Making





I did discuss findings with the patient and family member patient will be 

admitted she is much more responsive and awake and she was earlier today was 

negative for acute findings I discuss case with Dr. Pate the presentation 

appears to be consistent with dehydration pneumonia possible UTI





- Lab Data


Result diagrams: 


                                 12/10/20 10:19





                                 12/10/20 10:19


                                   Lab Results











  12/10/20 12/10/20 12/10/20 Range/Units





  10:19 10:19 10:19 


 


WBC  7.7    (3.8-10.6)  k/uL


 


RBC  4.24    (3.80-5.40)  m/uL


 


Hgb  13.2    (11.4-16.0)  gm/dL


 


Hct  38.7    (34.0-46.0)  %


 


MCV  91.2    (80.0-100.0)  fL


 


MCH  31.1    (25.0-35.0)  pg


 


MCHC  34.1    (31.0-37.0)  g/dL


 


RDW  13.2    (11.5-15.5)  %


 


Plt Count  192    (150-450)  k/uL


 


MPV  8.2    


 


Neutrophils %  81    %


 


Lymphocytes %  10    %


 


Monocytes %  6    %


 


Eosinophils %  2    %


 


Basophils %  1    %


 


Neutrophils #  6.2    (1.3-7.7)  k/uL


 


Lymphocytes #  0.8 L    (1.0-4.8)  k/uL


 


Monocytes #  0.5    (0-1.0)  k/uL


 


Eosinophils #  0.1    (0-0.7)  k/uL


 


Basophils #  0.0    (0-0.2)  k/uL


 


PT   10.2   (9.0-12.0)  sec


 


INR   1.0   (<1.2)  


 


APTT   22.1   (22.0-30.0)  sec


 


Sodium    140  (137-145)  mmol/L


 


Potassium    3.5  (3.5-5.1)  mmol/L


 


Chloride    105  ()  mmol/L


 


Carbon Dioxide    29  (22-30)  mmol/L


 


Anion Gap    6  mmol/L


 


BUN    24 H  (7-17)  mg/dL


 


Creatinine    1.22 H  (0.52-1.04)  mg/dL


 


Est GFR (CKD-EPI)AfAm    49  (>60 ml/min/1.73 sqM)  


 


Est GFR (CKD-EPI)NonAf    42  (>60 ml/min/1.73 sqM)  


 


Glucose    195 H  (74-99)  mg/dL


 


Calcium    9.0  (8.4-10.2)  mg/dL


 


Magnesium    2.1  (1.6-2.3)  mg/dL


 


Total Bilirubin    0.6  (0.2-1.3)  mg/dL


 


AST    24  (14-36)  U/L


 


ALT    19  (4-34)  U/L


 


Alkaline Phosphatase    58  ()  U/L


 


Ammonia     (<30)  umol/L


 


Creatine Kinase    107  ()  U/L


 


Troponin I     (0.000-0.034)  ng/mL


 


Total Protein    5.9 L  (6.3-8.2)  g/dL


 


Albumin    3.4 L  (3.5-5.0)  g/dL


 


Urine Color     


 


Urine Appearance     (Clear)  


 


Urine pH     (5.0-8.0)  


 


Ur Specific Gravity     (1.001-1.035)  


 


Urine Protein     (Negative)  


 


Urine Glucose (UA)     (Negative)  


 


Urine Ketones     (Negative)  


 


Urine Blood     (Negative)  


 


Urine Nitrite     (Negative)  


 


Urine Bilirubin     (Negative)  


 


Urine Urobilinogen     (<2.0)  mg/dL


 


Ur Leukocyte Esterase     (Negative)  


 


Urine RBC     (0-5)  /hpf


 


Urine WBC     (0-5)  /hpf


 


Urine Bacteria     (None)  /hpf


 


Urine Mucus     (None)  /hpf


 


Urine Opiates Screen     (NotDetected)  


 


Ur Oxycodone Screen     (NotDetected)  


 


Urine Methadone Screen     (NotDetected)  


 


Ur Propoxyphene Screen     (NotDetected)  


 


Ur Barbiturates Screen     (NotDetected)  


 


U Tricyclic Antidepress     (NotDetected)  


 


Ur Phencyclidine Scrn     (NotDetected)  


 


Ur Amphetamines Screen     (NotDetected)  


 


U Methamphetamines Scrn     (NotDetected)  


 


U Benzodiazepines Scrn     (NotDetected)  


 


Urine Cocaine Screen     (NotDetected)  


 


U Marijuana (THC) Screen     (NotDetected)  














  12/10/20 12/10/20 12/10/20 Range/Units





  10:19 10:19 11:38 


 


WBC     (3.8-10.6)  k/uL


 


RBC     (3.80-5.40)  m/uL


 


Hgb     (11.4-16.0)  gm/dL


 


Hct     (34.0-46.0)  %


 


MCV     (80.0-100.0)  fL


 


MCH     (25.0-35.0)  pg


 


MCHC     (31.0-37.0)  g/dL


 


RDW     (11.5-15.5)  %


 


Plt Count     (150-450)  k/uL


 


MPV     


 


Neutrophils %     %


 


Lymphocytes %     %


 


Monocytes %     %


 


Eosinophils %     %


 


Basophils %     %


 


Neutrophils #     (1.3-7.7)  k/uL


 


Lymphocytes #     (1.0-4.8)  k/uL


 


Monocytes #     (0-1.0)  k/uL


 


Eosinophils #     (0-0.7)  k/uL


 


Basophils #     (0-0.2)  k/uL


 


PT     (9.0-12.0)  sec


 


INR     (<1.2)  


 


APTT     (22.0-30.0)  sec


 


Sodium     (137-145)  mmol/L


 


Potassium     (3.5-5.1)  mmol/L


 


Chloride     ()  mmol/L


 


Carbon Dioxide     (22-30)  mmol/L


 


Anion Gap     mmol/L


 


BUN     (7-17)  mg/dL


 


Creatinine     (0.52-1.04)  mg/dL


 


Est GFR (CKD-EPI)AfAm     (>60 ml/min/1.73 sqM)  


 


Est GFR (CKD-EPI)NonAf     (>60 ml/min/1.73 sqM)  


 


Glucose     (74-99)  mg/dL


 


Calcium     (8.4-10.2)  mg/dL


 


Magnesium     (1.6-2.3)  mg/dL


 


Total Bilirubin     (0.2-1.3)  mg/dL


 


AST     (14-36)  U/L


 


ALT     (4-34)  U/L


 


Alkaline Phosphatase     ()  U/L


 


Ammonia   <9   (<30)  umol/L


 


Creatine Kinase     ()  U/L


 


Troponin I  <0.012    (0.000-0.034)  ng/mL


 


Total Protein     (6.3-8.2)  g/dL


 


Albumin     (3.5-5.0)  g/dL


 


Urine Color    Light Yellow  


 


Urine Appearance    Cloudy H  (Clear)  


 


Urine pH    6.5  (5.0-8.0)  


 


Ur Specific Gravity    1.009  (1.001-1.035)  


 


Urine Protein    Negative  (Negative)  


 


Urine Glucose (UA)    Negative  (Negative)  


 


Urine Ketones    Negative  (Negative)  


 


Urine Blood    Negative  (Negative)  


 


Urine Nitrite    Negative  (Negative)  


 


Urine Bilirubin    Negative  (Negative)  


 


Urine Urobilinogen    <2.0  (<2.0)  mg/dL


 


Ur Leukocyte Esterase    Large H  (Negative)  


 


Urine RBC    2  (0-5)  /hpf


 


Urine WBC    116 H  (0-5)  /hpf


 


Urine Bacteria    Occasional H  (None)  /hpf


 


Urine Mucus    Rare H  (None)  /hpf


 


Urine Opiates Screen    Detected H  (NotDetected)  


 


Ur Oxycodone Screen    Not Detected  (NotDetected)  


 


Urine Methadone Screen    Not Detected  (NotDetected)  


 


Ur Propoxyphene Screen    Not Detected  (NotDetected)  


 


Ur Barbiturates Screen    Not Detected  (NotDetected)  


 


U Tricyclic Antidepress    Not Detected  (NotDetected)  


 


Ur Phencyclidine Scrn    Not Detected  (NotDetected)  


 


Ur Amphetamines Screen    Not Detected  (NotDetected)  


 


U Methamphetamines Scrn    Not Detected  (NotDetected)  


 


U Benzodiazepines Scrn    Not Detected  (NotDetected)  


 


Urine Cocaine Screen    Not Detected  (NotDetected)  


 


U Marijuana (THC) Screen    Not Detected  (NotDetected)  














- Radiology Data


Radiology results: report reviewed (I did review the imaging there is evidence 

of right lower lobe infiltrate.), image reviewed





Disposition


Clinical Impression: 


 Right lower lobe pneumonia, Syncope and collapse, Dehydration, Acute con

fusional state





Disposition: ADMITTED AS IP TO THIS [facility]


Condition: Fair


Referrals: 


Poli Pate MD [Primary Care Provider] - 1-2 days

## 2020-12-11 LAB
GLUCOSE BLD-MCNC: 125 MG/DL (ref 75–99)
GLUCOSE BLD-MCNC: 192 MG/DL (ref 75–99)
GLUCOSE BLD-MCNC: 210 MG/DL (ref 75–99)
GLUCOSE BLD-MCNC: 285 MG/DL (ref 75–99)

## 2020-12-11 RX ADMIN — GABAPENTIN SCH MG: 300 CAPSULE ORAL at 16:10

## 2020-12-11 RX ADMIN — REPAGLINIDE SCH MG: 1 TABLET ORAL at 08:45

## 2020-12-11 RX ADMIN — INSULIN ASPART SCH: 100 INJECTION, SOLUTION INTRAVENOUS; SUBCUTANEOUS at 08:11

## 2020-12-11 RX ADMIN — HYDROCODONE BITARTRATE AND ACETAMINOPHEN SCH EACH: 7.5; 325 TABLET ORAL at 21:12

## 2020-12-11 RX ADMIN — CEFAZOLIN SCH MLS/HR: 330 INJECTION, POWDER, FOR SOLUTION INTRAMUSCULAR; INTRAVENOUS at 00:43

## 2020-12-11 RX ADMIN — GABAPENTIN SCH MG: 300 CAPSULE ORAL at 21:12

## 2020-12-11 RX ADMIN — FUROSEMIDE SCH MG: 40 TABLET ORAL at 16:10

## 2020-12-11 RX ADMIN — PIOGLITAZONE SCH MG: 15 TABLET ORAL at 08:46

## 2020-12-11 RX ADMIN — GABAPENTIN SCH MG: 300 CAPSULE ORAL at 08:44

## 2020-12-11 RX ADMIN — REPAGLINIDE SCH MG: 1 TABLET ORAL at 17:51

## 2020-12-11 RX ADMIN — REPAGLINIDE SCH MG: 1 TABLET ORAL at 12:50

## 2020-12-11 RX ADMIN — ATORVASTATIN CALCIUM SCH MG: 40 TABLET, FILM COATED ORAL at 21:12

## 2020-12-11 RX ADMIN — COLCHICINE SCH MG: 0.6 TABLET, FILM COATED ORAL at 08:45

## 2020-12-11 RX ADMIN — AZITHROMYCIN SCH MG: 500 TABLET, FILM COATED ORAL at 16:10

## 2020-12-11 RX ADMIN — CEFAZOLIN SCH: 330 INJECTION, POWDER, FOR SOLUTION INTRAMUSCULAR; INTRAVENOUS at 14:18

## 2020-12-11 RX ADMIN — FUROSEMIDE SCH MG: 40 TABLET ORAL at 08:44

## 2020-12-11 RX ADMIN — HYDROCODONE BITARTRATE AND ACETAMINOPHEN SCH EACH: 7.5; 325 TABLET ORAL at 08:44

## 2020-12-11 RX ADMIN — INSULIN ASPART SCH UNIT: 100 INJECTION, SOLUTION INTRAVENOUS; SUBCUTANEOUS at 12:50

## 2020-12-11 RX ADMIN — INSULIN ASPART SCH UNIT: 100 INJECTION, SOLUTION INTRAVENOUS; SUBCUTANEOUS at 17:51

## 2020-12-11 RX ADMIN — INSULIN ASPART SCH UNIT: 100 INJECTION, SOLUTION INTRAVENOUS; SUBCUTANEOUS at 21:12

## 2020-12-11 RX ADMIN — FUROSEMIDE SCH MG: 40 TABLET ORAL at 21:12

## 2020-12-11 NOTE — CT
EXAMINATION TYPE: CT chest wo con

 

DATE OF EXAM: 12/11/2020

 

COMPARISON: Radiograph same date

 

HISTORY: 80-year-old female RLL pneumonia

 

TECHNIQUE: Contiguous axial scanning of the chest without IV contrast. Coronal and sagittal reconstru
ctions performed.

 

CT DLP: 333 mGycm

Automated exposure control for dose reduction was used.

 

 

FINDINGS: 

Heart mildly enlarged without pericardial effusion. Three-vessel coronary artery calcifications are p
resent.

 

Ectatic ascending aorta 3.8 cm. Moderate atherosclerotic arch calcifications with conventional branch
ing anatomy.

 

Mildly enlarged 1.3 cm precarinal lymph node maintains its fatty hilum suggesting a benign etiology.

 

Prominent dependent atelectasis at both lung bases. More patchy adjacent opacity at the posterior rig
ht base. Band of atelectasis posterior left base.

 

Biapical pleural parenchymal scarring. Mild centrilobular emphysema.

 

Fusiform aneurysm of upper abdominal aorta 3.3 cm. Prominent ingested debris distending the stomach. 
Numerous calculi filling the gallbladder lumen. Most are very small, largest measures 1.8 cm. Tiny sp
lenule.

 

Bones: Degenerative changes left shoulder. DISH throughout the mid and lower thoracic spine.

 

 

 

IMPRESSION: 

 

1. COPD WITH MILD EMPHYSEMA.

2. MILD CARDIOMEGALY AND CAD WITH 3 VESSEL CORONARY ARTERY CALCIFICATIONS.

3. PATCHY POSTERIOR RIGHT BASILAR OPACITY COULD REPRESENT ATELECTASIS OR DEVELOPING PNEUMONIA. ADDITI
ONAL BAND OF ATELECTASIS POSTERIOR LEFT BASE.

4. PROXIMAL ABDOMINAL AAA AT 3.3 CM.

5. CHOLELITHIASIS.

## 2020-12-11 NOTE — XR
EXAMINATION TYPE: XR chest 1V portable

 

DATE OF EXAM: 12/11/2020

 

COMPARISON: 12/10/2020

 

INDICATION: Pneumonia

 

TECHNIQUE: Single frontal view of the chest is obtained.

 

FINDINGS:  

The heart size is enlarged.  

The pulmonary vasculature is normal.  

Minimal streak opacities at the left base are likely on the basis of atelectasis  

 

 

IMPRESSION:  

1. Small amount of left basilar atelectasis

## 2020-12-11 NOTE — HP
HISTORY AND PHYSICAL



DATE OF SERVICE:

12/10/2020



A 80-year-old white female who came to the hospital with a syncopal episode,

generalized weakness with the legs just giving out on her.  She was found to have

community-acquired pneumonia and urosepsis on admission and she looks dehydrated,

severely weak, fatigued.



HOME MEDICATIONS:

Zocor 80 daily, prednisone 10 daily, Coreg 12.5 b.i.d., Actos 15 daily, creatinine 1 mg

a.c. t.i.d., Lasix 40 mg t.i.d., colchicine 0.6 mg daily, Trulicity 0.75 mg once a

week, Neurontin 300 mg t.i.d., Norco 7.5 b.i.d.



ALLERGIES:

No known drug allergies.



14 POINT REVIEW OF SYSTEMS:

As mentioned above, otherwise negative.



PAST MEDICAL HISTORY:

Heart failure, diabetes mellitus, dyslipidemia, hypertension, memory impairment,

pneumonia, bilateral leg cellulitis.



SURGICAL HISTORY:

Hysterectomy, tonsillectomy.



FAMILY HISTORY:

Father negative.  Mother negative.



PHYSICAL EXAM:

Pulse is 49-79, temperature 97.8, respiratory rate 16 to 18, blood pressure is

130s/60s, O2 is 95 to 97.

CARDIOVASCULAR:  S1, S2,

GI:  Soft, tender to palpation left lower quadrant.

PSYCH:  Fair mood and affect.

NEUROLOGIC:  Cranial nerves are intact.

LUNGS:  Scattered rhonchi, right middle lobe.

HEMATOLOGY:  Negative Homans.

OPHTHALMOLOGIC:  Pupils equal, round, reactive to light and accommodation.



EKG sinus rhythm.



ASSESSMENT:

Community-acquired pneumonia, acute UTI with sepsis, dehydration, prerenal renal

failure, acute tubular necrosis, generalized weakness, moderate protein calorie

malnutrition.



Prognosis guarded.  Continue on Rocephin for UTI.  Azithromycin for community-acquired

pneumonia.  Pulmonary consult.  Infectious Disease consult.





MMODL / IJN: [id number] / Job#: [id number]

## 2020-12-11 NOTE — P.CNPUL
History of Present Illness


Consult date: 20


Reason for consult: dyspnea, cough, hypoxemia, pneumonia


Chief complaint: Shortness of breath/pneumonia


History of present illness: 





This is a 80-year-old female who has a syncopal episode brought into emergency 

department, her prior history significant for diabetes mellitus dyslipidemia and

diastolic heart failure, she used to smoke heavily quit several years ago, her 

urine is cloudy with large leukocyte esterase trace, Corona virus PCR is 

negative, computed tomography scan of the head negative for acute changes ho

wever old bilateral parietal lobe infarct was noted,  chest x-ray significant 

for infiltrate in the right lower lobe, computed tomography scan of the chest 

negative for pulmonary embolism however cardiomegaly coronary calcification and 

right lower lobe pneumonia was seen, patient is currently treated with Rocephin 

along with Zithromax





Review of Systems


All systems: negative





Past Medical History


Past Medical History: Asthma, Heart Failure, COPD, CVA/TIA, Diabetes Mellitus, 

Hyperlipidemia, Hypertension, Memory Impairment, Osteoarthritis (OA), Pneumonia,

Skin Disorder, Syncope


Additional Past Medical History / Comment(s): NIDDM type II, neuropathy 

bilateral hands/legs/feet, past bilateral lower leg cellulitis/wound, bilateral 

lower leg edema at times, bronchitis, TIA x 2, occasional low back pain, chronic

L leg pain since fall approximately one year ago-pt states physician has run 

tests on leg but no answer why it is painful, L foot gout.


History of Any Multi-Drug Resistant Organisms: None Reported


Past Surgical History: Bladder Surgery, Hysterectomy, Tonsillectomy


Additional Past Surgical History / Comment(s): Cystocele, R leg vein stripping, 

pain clinic procedures, bilateral cataract removals, colonoscopy, teeth 

extracted.


Past Anesthesia/Blood Transfusion Reactions: No Reported Reaction, Motion 

Sickness


Smoking Status: Former smoker





- Past Family History


  ** Father


Family Medical History: No Reported History


Additional Family Medical History / Comment(s): Father was healthy.  He lived to

be 87yrs old.





  ** Mother


Family Medical History: Diabetes Mellitus


Additional Family Medical History / Comment(s): Mother  of diabetic 

complications at the age of 61 yrs.





Medications and Allergies


                                Home Medications











 Medication  Instructions  Recorded  Confirmed  Type


 


Simvastatin [Zocor] 80 mg PO HS 09/22/14 12/10/20 History


 


predniSONE 10 mg PO DAILY 12/30/18 12/10/20 History


 


Carvedilol [Coreg] 12.5 mg PO BID 06/05/19 12/10/20 History


 


Pioglitazone [Actos] 15 mg PO DAILY 06/25/20 12/10/20 History


 


Repaglinide [Prandin] 1 mg PO AC-TID 06/25/20 12/10/20 History


 


Furosemide [Lasix] 40 mg PO TID 08/04/20 12/10/20 History


 


Colchicine 0.6 mg PO DAILY 08/12/20 12/10/20 History


 


Dulaglutide [Trulicity] 0.75 mg SQ TH 08/12/20 12/10/20 History


 


Gabapentin [Neurontin] 300 mg PO TID 12/10/20 12/10/20 History


 


HYDROcodone/APAP 7.5-325MG [Norco 1 tab PO BID 12/10/20 12/10/20 History





7.5-325]    








                                    Allergies











Allergy/AdvReac Type Severity Reaction Status Date / Time


 


No Known Allergies Allergy   Verified 12/10/20 13:52














Physical Exam


Vitals: 


                                   Vital Signs











  Temp Pulse Resp BP Pulse Ox


 


 20 11:00  97.8 F  51 L  17  133/67  94 L


 


 20 05:03  97.6 F  45 L  17  109/51  91 L


 


 20 02:55  97.9 F  60   130/65  97


 


 12/10/20 19:37  97.5 F L  64   157/67  99


 


 12/10/20 19:20   64   


 


 12/10/20 17:12  97.9 F  55 L  17  146/66  99


 


 12/10/20 16:56      95








                                Intake and Output











 20





 06:59 14:59 22:59


 


Intake Total 1450  


 


Balance 1450  


 


Intake:   


 


  IV 1100  


 


    Sodium Chloride 0.9% 1, 1100  





    000 ml @ 100 mls/hr IV .   





    Q10H [facility] Rx#:[id number]   


 


  Oral 350  


 


Other:   


 


  Voiding Method  Bedside Commode 


 


  # Voids 3  


 


  Weight  72.575 kg 














- Constitutional


General appearance: average body habitus, disheveled





- EENT


Eyes: EOMI, PERRLA


Ears: bilateral: normal





- Neck


Neck: normal ROM





- Respiratory


Respiratory: bilateral: CTA





- Cardiovascular


Rhythm: regular


Heart sounds: normal: S1, S2





- Gastrointestinal


General gastrointestinal: normal bowel sounds, soft





- Neurologic


Neurologic: CNII-XII intact





- Musculoskeletal


Musculoskeletal: gait normal, generalized weakness, strength equal bilaterally





- Psychiatric


Psychiatric: A&O x's 3, appropriate affect, intact judgment & insight





Results





- Laboratory Findings


CBC and BMP: 


                                 12/10/20 10:19





                                 12/10/20 10:19


PT/INR, D-dimer











PT  10.2 sec (9.0-12.0)   12/10/20  10:19    


 


INR  1.0  (<1.2)   12/10/20  10:19    








Abnormal lab findings: 


                                  Abnormal Labs











  12/10/20 12/10/20 12/10/20





  10:19 10:19 11:38


 


Lymphocytes #  0.8 L  


 


BUN   24 H 


 


Creatinine   1.22 H 


 


Glucose   195 H 


 


POC Glucose (mg/dL)   


 


Total Protein   5.9 L 


 


Albumin   3.4 L 


 


Urine Appearance    Cloudy H


 


Ur Leukocyte Esterase    Large H


 


Urine WBC    116 H


 


Urine Bacteria    Occasional H


 


Urine Mucus    Rare H


 


Urine Opiates Screen    Detected H














  12/10/20 12/10/20 12/11/20





  16:51 20:42 07:07


 


Lymphocytes #   


 


BUN   


 


Creatinine   


 


Glucose   


 


POC Glucose (mg/dL)  174 H  202 H  125 H


 


Total Protein   


 


Albumin   


 


Urine Appearance   


 


Ur Leukocyte Esterase   


 


Urine WBC   


 


Urine Bacteria   


 


Urine Mucus   


 


Urine Opiates Screen   














  20





  10:54


 


Lymphocytes # 


 


BUN 


 


Creatinine 


 


Glucose 


 


POC Glucose (mg/dL)  210 H


 


Total Protein 


 


Albumin 


 


Urine Appearance 


 


Ur Leukocyte Esterase 


 


Urine WBC 


 


Urine Bacteria 


 


Urine Mucus 


 


Urine Opiates Screen 














- Diagnostic Findings


Chest x-ray: report reviewed, image reviewed


CT scan - chest: report reviewed, image reviewed (Finding as noted above)





Assessment and Plan


Assessment: 





Right lower lobe pneumonia





Syncopal episode





Urinary tract infection





History of bilateral parietal lobe infarct





Hypertension hypertensive cardiovascular disease


Plan: 





Continue antibiotics





Continue to monitor clinical course closely





Further recommendations pending plan of care as per clinical response of the pa

tient


Time with Patient: Greater than 30

## 2020-12-12 VITALS — RESPIRATION RATE: 17 BRPM | TEMPERATURE: 97.4 F

## 2020-12-12 LAB
GLUCOSE BLD-MCNC: 111 MG/DL (ref 75–99)
GLUCOSE BLD-MCNC: 184 MG/DL (ref 75–99)
GLUCOSE BLD-MCNC: 271 MG/DL (ref 75–99)
GLUCOSE BLD-MCNC: 295 MG/DL (ref 75–99)

## 2020-12-12 RX ADMIN — INSULIN ASPART SCH UNIT: 100 INJECTION, SOLUTION INTRAVENOUS; SUBCUTANEOUS at 13:39

## 2020-12-12 RX ADMIN — HYDROCODONE BITARTRATE AND ACETAMINOPHEN SCH EACH: 7.5; 325 TABLET ORAL at 08:27

## 2020-12-12 RX ADMIN — COLCHICINE SCH MG: 0.6 TABLET, FILM COATED ORAL at 08:26

## 2020-12-12 RX ADMIN — INSULIN ASPART SCH: 100 INJECTION, SOLUTION INTRAVENOUS; SUBCUTANEOUS at 07:39

## 2020-12-12 RX ADMIN — INSULIN ASPART SCH UNIT: 100 INJECTION, SOLUTION INTRAVENOUS; SUBCUTANEOUS at 20:50

## 2020-12-12 RX ADMIN — AZITHROMYCIN SCH MG: 500 TABLET, FILM COATED ORAL at 15:06

## 2020-12-12 RX ADMIN — INSULIN ASPART SCH UNIT: 100 INJECTION, SOLUTION INTRAVENOUS; SUBCUTANEOUS at 18:01

## 2020-12-12 RX ADMIN — FUROSEMIDE SCH MG: 40 TABLET ORAL at 08:25

## 2020-12-12 RX ADMIN — GABAPENTIN SCH MG: 300 CAPSULE ORAL at 15:06

## 2020-12-12 RX ADMIN — CEFAZOLIN SCH MLS/HR: 330 INJECTION, POWDER, FOR SOLUTION INTRAMUSCULAR; INTRAVENOUS at 23:44

## 2020-12-12 RX ADMIN — PIOGLITAZONE SCH MG: 15 TABLET ORAL at 08:27

## 2020-12-12 RX ADMIN — FUROSEMIDE SCH MG: 40 TABLET ORAL at 20:49

## 2020-12-12 RX ADMIN — REPAGLINIDE SCH MG: 1 TABLET ORAL at 13:40

## 2020-12-12 RX ADMIN — REPAGLINIDE SCH MG: 1 TABLET ORAL at 18:01

## 2020-12-12 RX ADMIN — HYDROCODONE BITARTRATE AND ACETAMINOPHEN SCH EACH: 7.5; 325 TABLET ORAL at 20:49

## 2020-12-12 RX ADMIN — REPAGLINIDE SCH MG: 1 TABLET ORAL at 08:25

## 2020-12-12 RX ADMIN — ATORVASTATIN CALCIUM SCH MG: 40 TABLET, FILM COATED ORAL at 20:49

## 2020-12-12 RX ADMIN — FUROSEMIDE SCH MG: 40 TABLET ORAL at 15:06

## 2020-12-12 RX ADMIN — CEFAZOLIN SCH MLS/HR: 330 INJECTION, POWDER, FOR SOLUTION INTRAMUSCULAR; INTRAVENOUS at 04:34

## 2020-12-12 RX ADMIN — GABAPENTIN SCH MG: 300 CAPSULE ORAL at 08:25

## 2020-12-12 RX ADMIN — CEFAZOLIN SCH: 330 INJECTION, POWDER, FOR SOLUTION INTRAMUSCULAR; INTRAVENOUS at 15:10

## 2020-12-12 RX ADMIN — GABAPENTIN SCH MG: 300 CAPSULE ORAL at 20:49

## 2020-12-12 RX ADMIN — CEFAZOLIN SCH MLS/HR: 330 INJECTION, POWDER, FOR SOLUTION INTRAMUSCULAR; INTRAVENOUS at 08:27

## 2020-12-12 NOTE — PN
PROGRESS NOTE



30-year-old white female who was admitted with urosepsis.  She had a CT scan that

showed mildly enlarged heart.  Three vessel coronary calcifications and

atherosclerosis.  Benign lymph node in the lung near the hilum.  Mild COPD.  Aneurysm

about 3.3 cm.  Numerous calculi in the gallbladder.  She has COPD with mild emphysema

on CAT scan.  Cardiomegaly as mentioned above.  Abdominal AAA at 3.3, gallstones,

possible early pneumonia.  She has been on broad-spectrum antibiotics, being treated

for UTI with urosepsis.  She states she is slowly getting better.  Sugars have been in

the mid 200s.  Urine culture is gram-negative bacilli.  Blood cultures negative growth

for 24 hours.  Awaiting for the culture and sensitivity for her to go home.



Cardiovascular S1, S2.  Lungs show scattered rhonchi and wheeze.  Hematology: Negative

Homans.  Psych: Fair mood and affect.



ASSESSMENT:

1. Urinary tract infection with sepsis.

2. Urosepsis.

3. Dehydration.

4. Prerenal renal insufficiency.

5. Probable community-acquired pneumonia.

6. Mild chronic obstructive pulmonary disease.

7. Syncopal episode secondary to right lower lobe pneumonia and urosepsis.

8. History of bilateral parietal occipital infarct.

9. Hypertensive cardiovascular disease.

Further orders pending urine culture and clinical condition.





MMODL / IJN: [id number] / Job#: [id number]

## 2020-12-12 NOTE — US
EXAMINATION TYPE: US carotid duplex BILAT

 

DATE OF EXAM: 12/12/2020

 

COMPARISON: US 2017

 

CLINICAL HISTORY: cva x 2. Syncope

 

EXAM MEASUREMENTS: 

 

RIGHT:  Peak Systolic Velocity (PSV) cm/sec

----- Right CCA:  53.1  

----- Right ICA:  89.0     

----- Right ECA:  79.2   

ICA/CCA ratio:  1.7    

 

RIGHT:  End Diastole cm/sec

----- Right CCA:  0.0   

----- Right ICA:  18.3      

----- Right ECA:  0.0     

 

LEFT:  Peak Systolic Velocity (PSV) cm/sec

----- Left CCA:  44.5  

----- Left ICA:  99.1   

----- Left ECA:  102.5  

ICA/CCA ratio:  2.2  

 

LEFT:  End Diastole cm/sec

----- Left CCA:  7.2  

----- Left ICA:  23.8   

----- Left ECA:  0.0 

 

VERTEBRALS (direction of flow):

Right Vertebral: Antegrade

Left Vertebral: Antegrade

 

Rhythm:  Normal

 

Bilateral intimal thickening, plaque bilateral CCA, bulb and proximal ICA without elevated velocities
. The left ICA/CCA ratio 2.2 is borderline. 

 

 

 

IMPRESSION:  No evidence of hemodynamically significant stenosis of the internal carotid arteries.   


 

 

Criteria for Assigning % of Stenosis / Diameter reduction

(Estimation based on the indirect measurements of the internal carotid artery velocities (ICA PSV).

1.  Normal (no stenosis)=ICA PSV < 125 cm/s: ratio < 2.0: ICA EDV<40 cm/s.

2. Less than 50% stenosis=ICA PSV < 125 cm/s: ratio < 2.0: ICA EDV<40 cm/s.

3.  50 to 69% stenosis=ICA PSV of 125 to 230 cm/s: ratio 2.0 ? 4.0: ICA EDV  cm/s.

4.  Greater than 70% stenosis to near occlusion= ICA PSV > 230 cm/s: ratio > 4.0: ICA EDV > 100 cm/s.
 

5.  Near occlusion= ICA PSV velocities may be low or undetectable: variable ratio and ICA EDV.

6.  Total occlusion=unable to detect flow.

## 2020-12-12 NOTE — P.PN
Subjective


Progress Note Date: 12/12/20


Principal diagnosis: 


Right lower lobe pneumonia





Syncopal episode





Urinary tract infection





History of bilateral parietal lobe infarct





Hypertension hypertensive cardiovascular disease





12/12/2020, patient seen eval examined during the rounds labs reviewed 

medications reviewed care plan discussed mildly shortness of breath but however 

denies any chest pain cough congestion is improved, on room air saturation is 

96%,





This is a 80-year-old female who has a syncopal episode brought into emergency 

department, her prior history significant for diabetes mellitus dyslipidemia and

diastolic heart failure, she used to smoke heavily quit several years ago, her 

urine is cloudy with large leukocyte esterase trace, Corona virus PCR is 

negative, computed tomography scan of the head negative for acute changes 

however old bilateral parietal lobe infarct was noted,  chest x-ray significant 

for infiltrate in the right lower lobe, computed tomography scan of the chest 

negative for pulmonary embolism however cardiomegaly coronary calcification and 

right lower lobe pneumonia was seen, patient is currently treated with Rocephin 

along with Zithromax








Objective





- Vital Signs


Vital signs: 


                                   Vital Signs











Temp  97.7 F   12/12/20 05:00


 


Pulse  69   12/12/20 08:39


 


Resp  18   12/12/20 05:00


 


BP  122/58   12/12/20 05:00


 


Pulse Ox  96   12/12/20 05:00








                                 Intake & Output











 12/11/20 12/12/20 12/12/20





 18:59 06:59 18:59


 


Intake Total 1050 1200 


 


Balance 1050 1200 


 


Weight 72.575 kg  


 


Intake:   


 


  IV 1000 1200 


 


    Sodium Chloride 0.9% 1, 1000 1200 





    000 ml @ 100 mls/hr IV .   





    Q10H ALYSE Rx#:[id number]   


 


  Intake, IV Titration 50  





  Amount   


 


    cefTRIAXone 2 gm In 50  





    Sodium Chloride 0.9% 50   





    ml @ 100 mls/hr IVPB Q24H   





    ALYSE Rx#:[id number]   


 


Other:   


 


  Voiding Method Bedside Commode Bedside Commode 


 


  # Voids 3 3 














- Exam





- Constitutional


General appearance: average body habitus, disheveled





- EENT


Eyes: EOMI, PERRLA


Ears: bilateral: normal





- Neck


Neck: normal ROM





- Respiratory


Respiratory: bilateral: CTA





- Cardiovascular


Rhythm: regular


Heart sounds: normal: S1, S2





- Gastrointestinal


General gastrointestinal: normal bowel sounds, soft





- Neurologic


Neurologic: CNII-XII intact





- Musculoskeletal


Musculoskeletal: gait normal, generalized weakness, strength equal bilaterally





- Psychiatric


Psychiatric: A&O x's 3, appropriate affect, intact judgment & insight








- Labs


CBC & Chem 7: 


                                 12/10/20 10:19





                                 12/10/20 10:19


Labs: 


                  Abnormal Lab Results - Last 24 Hours (Table)











  12/11/20 12/11/20 12/11/20 Range/Units





  10:54 17:14 20:33 


 


POC Glucose (mg/dL)  210 H  285 H  192 H  (75-99)  mg/dL














  12/12/20 Range/Units





  06:56 


 


POC Glucose (mg/dL)  111 H  (75-99)  mg/dL








                      Microbiology - Last 24 Hours (Table)











 12/10/20 11:38 Urine Culture - Preliminary





 Urine,Voided    Gram Neg Bacilli


 


 12/10/20 14:30 Blood Culture - Preliminary





 Blood    No Growth after 24 hours














Assessment and Plan


Assessment: 





Right lower lobe pneumonia





Syncopal episode





Urinary tract infection





History of bilateral parietal lobe infarct





Hypertension hypertensive cardiovascular disease


Plan: 





Continue antibiotics, patient can be switched to oral 





Continue to monitor clinical course closely





Further recommendations pending plan of care as per clinical response of the 

patient





Agree with discharge planning follow-up in outpatient setting


Time with Patient: Greater than 30

## 2020-12-13 VITALS — SYSTOLIC BLOOD PRESSURE: 136 MMHG | DIASTOLIC BLOOD PRESSURE: 71 MMHG | HEART RATE: 56 BPM

## 2020-12-13 LAB
ALBUMIN SERPL-MCNC: 3.9 G/DL (ref 3.8–4.9)
ALBUMIN/GLOB SERPL: 2.29 G/DL (ref 1.6–3.17)
ALP SERPL-CCNC: 59 U/L (ref 41–126)
ALT SERPL-CCNC: 19 U/L (ref 8–44)
ANION GAP SERPL CALC-SCNC: 8.7 MMOL/L (ref 4–12)
AST SERPL-CCNC: 19 U/L (ref 13–35)
BASOPHILS # BLD AUTO: 0 K/UL (ref 0–0.2)
BASOPHILS NFR BLD AUTO: 1 %
BUN SERPL-SCNC: 22 MG/DL (ref 9–27)
BUN/CREAT SERPL: 18.33 RATIO (ref 12–20)
CALCIUM SPEC-MCNC: 9 MG/DL (ref 8.7–10.3)
CHLORIDE SERPL-SCNC: 103 MMOL/L (ref 96–109)
CO2 SERPL-SCNC: 36.3 MMOL/L (ref 21.6–31.8)
EOSINOPHIL # BLD AUTO: 0.1 K/UL (ref 0–0.7)
EOSINOPHIL NFR BLD AUTO: 1 %
ERYTHROCYTE [DISTWIDTH] IN BLOOD BY AUTOMATED COUNT: 4.72 M/UL (ref 3.8–5.4)
ERYTHROCYTE [DISTWIDTH] IN BLOOD: 13.4 % (ref 11.5–15.5)
GLOBULIN SER CALC-MCNC: 1.7 G/DL (ref 1.6–3.3)
GLUCOSE BLD-MCNC: 122 MG/DL (ref 75–99)
GLUCOSE SERPL-MCNC: 138 MG/DL (ref 70–110)
HCT VFR BLD AUTO: 44 % (ref 34–46)
HGB BLD-MCNC: 14.5 GM/DL (ref 11.4–16)
LYMPHOCYTES # SPEC AUTO: 1.5 K/UL (ref 1–4.8)
LYMPHOCYTES NFR SPEC AUTO: 17 %
MCH RBC QN AUTO: 30.7 PG (ref 25–35)
MCHC RBC AUTO-ENTMCNC: 33 G/DL (ref 31–37)
MCV RBC AUTO: 93.1 FL (ref 80–100)
MONOCYTES # BLD AUTO: 0.5 K/UL (ref 0–1)
MONOCYTES NFR BLD AUTO: 6 %
NEUTROPHILS # BLD AUTO: 6.7 K/UL (ref 1.3–7.7)
NEUTROPHILS NFR BLD AUTO: 74 %
PLATELET # BLD AUTO: 174 K/UL (ref 150–450)
POTASSIUM SERPL-SCNC: 3.2 MMOL/L (ref 3.5–5.5)
PROT SERPL-MCNC: 5.6 G/DL (ref 6.2–8.2)
SODIUM SERPL-SCNC: 148 MMOL/L (ref 135–145)
WBC # BLD AUTO: 9 K/UL (ref 3.8–10.6)

## 2020-12-13 RX ADMIN — REPAGLINIDE SCH MG: 1 TABLET ORAL at 08:25

## 2020-12-13 RX ADMIN — REPAGLINIDE SCH MG: 1 TABLET ORAL at 12:09

## 2020-12-13 RX ADMIN — INSULIN ASPART SCH: 100 INJECTION, SOLUTION INTRAVENOUS; SUBCUTANEOUS at 12:09

## 2020-12-13 RX ADMIN — FUROSEMIDE SCH MG: 40 TABLET ORAL at 08:25

## 2020-12-13 RX ADMIN — INSULIN ASPART SCH: 100 INJECTION, SOLUTION INTRAVENOUS; SUBCUTANEOUS at 07:21

## 2020-12-13 RX ADMIN — HYDROCODONE BITARTRATE AND ACETAMINOPHEN SCH EACH: 7.5; 325 TABLET ORAL at 08:25

## 2020-12-13 RX ADMIN — PIOGLITAZONE SCH MG: 15 TABLET ORAL at 08:25

## 2020-12-13 RX ADMIN — GABAPENTIN SCH MG: 300 CAPSULE ORAL at 08:25

## 2020-12-13 RX ADMIN — COLCHICINE SCH MG: 0.6 TABLET, FILM COATED ORAL at 08:25

## 2020-12-13 NOTE — DS
DISCHARGE SUMMARY



DISCHARGE DIAGNOSES:

1. Altered mental status.

2. Syncope.

3. Dehydration.

4. Right lower lobe pneumonia.

5. Urinary tract infection with sepsis.

6. Syncope and collapse.

7. Diabetes mellitus.

8. Dyslipidemia.

9. Prior cerebrovascular accident  times two.



DISCHARGE MEDICINES:

Levaquin 500 mg daily for 7 days, Zocor 80 mg daily, prednisone 10 mg daily, carvedilol

12.5 b.i.d., Actos 50 mg daily, Prandin 1 mg a.c. t.i.d., Lasix 40 mg t.i.d., Trulicity

0.75 mg once a week, Colchicine 0.6 mg daily, Norco 7.5 b.i.d., Neurontin 300 t.i.d.



Follow up in office in a week.



CONDITION:

Stable.



PROGNOSIS:

Guarded.



Ambulate as tolerated.



HOSPITAL COURSE:

Patient was admitted with right lower lobe pneumonia, UTI with sepsis, started on IV

antibiotics.  Azithromycin, Zosyn. Covid was ruled out.  Patient sensitive to multiple

antibiotics, sent home on oral Levaquin after IV antibiotics improved over the next

couple days.  She was stabilized and will be followed up as an outpatient next week.



DISCHARGE DIET:

Regular.



Ambulate as tolerated.



We will send home with nursing and PT for generalized weakness.





JESSICA / BUCK: [id number] / Job#: [id number]

## 2020-12-13 NOTE — P.PN
Subjective


Progress Note Date: 12/13/20


Principal diagnosis: 


Right lower lobe pneumonia





Syncopal episode





E. coli Urinary tract infection





History of bilateral parietal lobe infarct





Hypertension hypertensive cardiovascular disease





12/13/2020, patient seen eval examined during the rounds labs reviewed 

medications reviewed, remains on room air saturation is 97% now afebrile, 

asymptomatic bradycardia is present, she underwent carotid duplex for syncopal 

workup no significant hemodynamic stenosis identified, urine culture positive 

for E. coli sensitive to Rocephin





12/12/2020, patient seen eval examined during the rounds labs reviewed 

medications reviewed care plan discussed mildly shortness of breath but however 

denies any chest pain cough congestion is improved, on room air saturation is 

96%,





This is a 80-year-old female who has a syncopal episode brought into emergency 

department, her prior history significant for diabetes mellitus dyslipidemia and

diastolic heart failure, she used to smoke heavily quit several years ago, her 

urine is cloudy with large leukocyte esterase trace, Corona virus PCR is neg

ative, computed tomography scan of the head negative for acute changes however 

old bilateral parietal lobe infarct was noted,  chest x-ray significant for 

infiltrate in the right lower lobe, computed tomography scan of the chest 

negative for pulmonary embolism however cardiomegaly coronary calcification and 

right lower lobe pneumonia was seen, patient is currently treated with Rocephin 

along with Zithromax








Objective





- Vital Signs


Vital signs: 


                                   Vital Signs











Temp  97.4 F L  12/13/20 05:00


 


Pulse  56 L  12/13/20 05:00


 


Resp  17   12/13/20 05:00


 


BP  136/71   12/13/20 05:00


 


Pulse Ox  97   12/13/20 05:00








                                 Intake & Output











 12/12/20 12/13/20 12/13/20





 18:59 06:59 18:59


 


Intake Total 1175  


 


Balance 1175  


 


Weight  52 kg 


 


Intake:   


 


  IV 1175  


 


    Sodium Chloride 0.9% 1, 1125  





    000 ml @ 100 mls/hr IV .   





    Q10H ALYSE Rx#:[id number]   


 


    cefTRIAXone 2 gm In 50  





    Sodium Chloride 0.9% 50   





    ml @ 100 mls/hr IVPB Q24H   





    ALYSE Rx#:[id number]   


 


Other:   


 


  Voiding Method  Bedside Commode 


 


  # Voids 1 4 














- Exam





- Constitutional


General appearance: average body habitus, disheveled





- EENT


Eyes: EOMI, PERRLA


Ears: bilateral: normal





- Neck


Neck: normal ROM





- Respiratory


Respiratory: bilateral: CTA





- Cardiovascular


Rhythm: regular


Heart sounds: normal: S1, S2





- Gastrointestinal


General gastrointestinal: normal bowel sounds, soft





- Neurologic


Neurologic: CNII-XII intact





- Musculoskeletal


Musculoskeletal: gait normal, generalized weakness, strength equal bilaterally





- Psychiatric


Psychiatric: A&O x's 3, appropriate affect, intact judgment & insight








- Labs


CBC & Chem 7: 


                                 12/13/20 06:36





                                 12/13/20 06:36


Labs: 


                  Abnormal Lab Results - Last 24 Hours (Table)











  12/12/20 12/12/20 12/13/20 Range/Units





  16:56 20:31 06:36 


 


Sodium    148 H  (135-145)  mmol/L


 


Potassium    3.2 L  (3.5-5.5)  mmol/L


 


Carbon Dioxide    36.3 H  (21.6-31.8)  mmol/L


 


Est GFR (CKD-EPI)AfAm    49.8 L  (60.0-200.0)   


 


Est GFR (CKD-EPI)NonAf    42.9 L  (60.0-200.0)   


 


Glucose    138 H  ()  mg/dL


 


POC Glucose (mg/dL)  184 H  271 H   (75-99)  mg/dL


 


Total Protein    5.6 L  (6.2-8.2)  g/dL














  12/13/20 Range/Units





  07:06 


 


Sodium   (135-145)  mmol/L


 


Potassium   (3.5-5.5)  mmol/L


 


Carbon Dioxide   (21.6-31.8)  mmol/L


 


Est GFR (CKD-EPI)AfAm   (60.0-200.0)   


 


Est GFR (CKD-EPI)NonAf   (60.0-200.0)   


 


Glucose   ()  mg/dL


 


POC Glucose (mg/dL)  122 H  (75-99)  mg/dL


 


Total Protein   (6.2-8.2)  g/dL








                      Microbiology - Last 24 Hours (Table)











 12/10/20 11:38 Urine Culture - Final





 Urine,Voided    Escherichia coli


 


 12/10/20 14:30 Blood Culture - Preliminary





 Blood    No Growth after 48 hours














Assessment and Plan


Assessment: 





Right lower lobe pneumonia





Syncopal episode





E. coli Urinary tract infection sensitive to Rocephin





History of bilateral parietal lobe infarct





Hypertension hypertensive cardiovascular disease


Plan: 





Continue antibiotics, 





Continue to monitor clinical course closely





Further recommendations pending plan of care as per clinical response of the 

patient





Agree with discharge planning follow-up in outpatient setting

## 2020-12-15 NOTE — CDI
Documentation Clarification Form



Date: 12/15/2020 08:08:00 AM

From: Desirae Quinn

Phone: 

MRN: [id number]

Admit Date: 12/10/2020 02:28:00 PM

Patient Name: Michelle Samuel

Visit Number: [id number]

Discharge Date:  12/13/2020 01:05:00 PM





ATTENTION: The Clinical Documentation Specialists (CDI) and [facility] Coding Staff 
appreciate your assistance in clarifying documentation. Please respond to the 
clarification below the line at the bottom and electronically sign. The CDI & 
[facility] Coding staff will review the response and follow-up if needed. Please note: 
Queries are made part of the Legal Health Record. If you have any questions, 
please contact the author of this message via ITS.



Dr. Poli Pate,



Altered Mental Status was documented in the discharge summary. Per ED note- 
acute confusional state.



History/Risk Factors: Heart failure, DM, HLD, HTN, Memory impairment, PNA

Clinical Indicators: A 80-year-old white female who came to the hospital with a 
syncopal episode, generalized weakness with the legs just giving out on her. She
was found to have community-acquired pneumonia and urosepsis on admission and 
she looks

dehydrated, severely weak, fatigued.

Labs: lymphocytes-0.8, BUN-4, Cr-1.22, TP-2.9, Albumin-3.4, Urine: Leukocyte 
Esterase-large, WBC-116, Bacteria-occasional, COVID negative

Brain CT: No acute intracranial hemorrhage or midline shift. There is mild to 
moderate diffuse age-related cerebral atrophy and moderate to severe chronic 
small vessel ischemic change redemonstrated. Old infarct left parietal lobe 
posterior watershed redemonstrated. There is similar old infarct right parietal 
lobe redemonstrated. A subacute on chronic component at this level cannot be 
excluded.

Treatment: IV fluids, IV antibiotics, 



In your professional opinion, please clarify the etiology of the Altered Mental 
Status, if known.

Delirium (specify cause): ___________________________________

Dementia (if know, specify Type and if with/without Behavioral Disturbance) 
_______

Encephalopathy (specify Type and Underlying Medical Illness)___________

Other condition (please specify)_________________

Unable to determine

___________________________________________________________________________

MTDD

## 2020-12-16 ENCOUNTER — HOSPITAL ENCOUNTER (OUTPATIENT)
Dept: HOSPITAL 47 - LABWHC1 | Age: 80
Discharge: HOME | End: 2020-12-16
Attending: FAMILY MEDICINE
Payer: MEDICARE

## 2020-12-16 DIAGNOSIS — I10: ICD-10-CM

## 2020-12-16 DIAGNOSIS — Z79.899: ICD-10-CM

## 2020-12-16 DIAGNOSIS — N39.0: Primary | ICD-10-CM

## 2020-12-16 LAB
ANION GAP SERPL CALC-SCNC: 7.1 MMOL/L (ref 4–12)
BUN SERPL-SCNC: 41 MG/DL (ref 9–27)
CHLORIDE SERPL-SCNC: 104 MMOL/L (ref 96–109)
CO2 SERPL-SCNC: 33.9 MMOL/L (ref 21.6–31.8)
POTASSIUM SERPL-SCNC: 3.4 MMOL/L (ref 3.5–5.5)
SODIUM SERPL-SCNC: 145 MMOL/L (ref 135–145)

## 2020-12-16 PROCEDURE — 84520 ASSAY OF UREA NITROGEN: CPT

## 2020-12-16 PROCEDURE — 82565 ASSAY OF CREATININE: CPT

## 2020-12-16 PROCEDURE — 36415 COLL VENOUS BLD VENIPUNCTURE: CPT

## 2020-12-16 PROCEDURE — 80051 ELECTROLYTE PANEL: CPT

## 2020-12-16 PROCEDURE — 87086 URINE CULTURE/COLONY COUNT: CPT

## 2020-12-21 NOTE — CDI
Documentation Clarification Form



Date: 12/15/2020 08:08:00 AM

From: Desirae Quinn

Phone: Please call Anay Contreras at [phone] from 8-5pm for questions

MRN: [id number]

Admit Date: 12/10/2020 02:28:00 PM

Patient Name: Michelle Samuel

Visit Number: [id number]

Discharge Date:  12/13/2020 01:05:00 PM





ATTENTION: The Clinical Documentation Specialists (CDI) and [facility] Coding Staff 
appreciate your assistance in clarifying documentation. Please respond to the 
clarification below the line at the bottom and electronically sign. The CDI & 
[facility] Coding staff will review the response and follow-up if needed. Please note: 
Queries are made part of the Legal Health Record. If you have any questions, 
please contact the author of this message via ITS.



Dr. Poli Pate, 



Altered Mental Status was documented in the discharge summary. Per ED note- 
acute confusional state.



History/Risk Factors: Heart failure, DM, HLD, HTN, Memory impairment, PNA

Clinical Indicators: A 80-year-old white female who came to the hospital with a 
syncopal episode, generalized weakness with the legs just giving out on her. She
was found to have community-acquired pneumonia and urosepsis on admission and 
she looks

dehydrated, severely weak, fatigued.

Labs: lymphocytes-0.8, BUN-4, Cr-1.22, TP-2.9, Albumin-3.4, Urine: Leukocyte 
Esterase-large, WBC-116, Bacteria-occasional, COVID negative

Brain CT: No acute intracranial hemorrhage or midline shift. There is mild to 
moderate diffuse age-related cerebral atrophy and moderate to severe chronic 
small vessel ischemic change redemonstrated. Old infarct left parietal lobe 
posterior watershed redemonstrated. There is similar old infarct right parietal 
lobe redemonstrated. A subacute on chronic component at this level cannot be 
excluded.

Treatment: IV fluids, IV antibiotics, 



In your professional opinion, please clarify the etiology of the Altered Mental 
Status, if known.

Delirium (specify cause): ___________________________________

Dementia (if know, specify Type and if with/without Behavioral Disturbance) 
_______

Encephalopathy (specify Type and Underlying Medical Illness)___________

Other condition (please specify)_________________

Unable to determine

___________________________________________________________________________

MTDD

## 2020-12-23 NOTE — PN
PROGRESS NOTE



ADDENDUM:

Delirium secondary to infection and dehydration.





MMODL / IJN: [id number] / Job#: [id number]

## 2020-12-24 ENCOUNTER — HOSPITAL ENCOUNTER (OUTPATIENT)
Dept: HOSPITAL 47 - LABWHC1 | Age: 80
Discharge: HOME | End: 2020-12-24
Attending: FAMILY MEDICINE
Payer: MEDICARE

## 2020-12-24 DIAGNOSIS — I10: Primary | ICD-10-CM

## 2020-12-24 DIAGNOSIS — B89: ICD-10-CM

## 2020-12-24 LAB
ANION GAP SERPL CALC-SCNC: 8.5 MMOL/L (ref 4–12)
BUN SERPL-SCNC: 35 MG/DL (ref 9–27)
CHLORIDE SERPL-SCNC: 102 MMOL/L (ref 96–109)
CO2 SERPL-SCNC: 32.5 MMOL/L (ref 21.6–31.8)
POTASSIUM SERPL-SCNC: 4.3 MMOL/L (ref 3.5–5.5)
SODIUM SERPL-SCNC: 143 MMOL/L (ref 135–145)

## 2020-12-24 PROCEDURE — 80051 ELECTROLYTE PANEL: CPT

## 2020-12-24 PROCEDURE — 84520 ASSAY OF UREA NITROGEN: CPT

## 2020-12-24 PROCEDURE — 82565 ASSAY OF CREATININE: CPT

## 2020-12-24 PROCEDURE — 87045 FECES CULTURE AEROBIC BACT: CPT

## 2020-12-24 PROCEDURE — 36415 COLL VENOUS BLD VENIPUNCTURE: CPT

## 2020-12-24 PROCEDURE — 87046 STOOL CULTR AEROBIC BACT EA: CPT

## 2021-05-19 ENCOUNTER — HOSPITAL ENCOUNTER (INPATIENT)
Dept: HOSPITAL 47 - EC | Age: 81
LOS: 5 days | Discharge: NURSING, REHAB OR LONG-TERM CARE | DRG: 554 | End: 2021-05-24
Attending: FAMILY MEDICINE | Admitting: FAMILY MEDICINE
Payer: MEDICARE

## 2021-05-19 VITALS — BODY MASS INDEX: 27.4 KG/M2

## 2021-05-19 DIAGNOSIS — Z98.890: ICD-10-CM

## 2021-05-19 DIAGNOSIS — M10.9: Primary | ICD-10-CM

## 2021-05-19 DIAGNOSIS — E86.0: ICD-10-CM

## 2021-05-19 DIAGNOSIS — Z98.41: ICD-10-CM

## 2021-05-19 DIAGNOSIS — L03.116: ICD-10-CM

## 2021-05-19 DIAGNOSIS — Z87.448: ICD-10-CM

## 2021-05-19 DIAGNOSIS — H91.90: ICD-10-CM

## 2021-05-19 DIAGNOSIS — Z98.818: ICD-10-CM

## 2021-05-19 DIAGNOSIS — I25.10: ICD-10-CM

## 2021-05-19 DIAGNOSIS — Z87.891: ICD-10-CM

## 2021-05-19 DIAGNOSIS — E78.5: ICD-10-CM

## 2021-05-19 DIAGNOSIS — E78.00: ICD-10-CM

## 2021-05-19 DIAGNOSIS — Z87.42: ICD-10-CM

## 2021-05-19 DIAGNOSIS — I13.0: ICD-10-CM

## 2021-05-19 DIAGNOSIS — Z83.3: ICD-10-CM

## 2021-05-19 DIAGNOSIS — N18.9: ICD-10-CM

## 2021-05-19 DIAGNOSIS — Z86.73: ICD-10-CM

## 2021-05-19 DIAGNOSIS — Z90.89: ICD-10-CM

## 2021-05-19 DIAGNOSIS — L03.032: ICD-10-CM

## 2021-05-19 DIAGNOSIS — I35.8: ICD-10-CM

## 2021-05-19 DIAGNOSIS — M43.16: ICD-10-CM

## 2021-05-19 DIAGNOSIS — F41.9: ICD-10-CM

## 2021-05-19 DIAGNOSIS — Z87.01: ICD-10-CM

## 2021-05-19 DIAGNOSIS — Z90.710: ICD-10-CM

## 2021-05-19 DIAGNOSIS — Z98.42: ICD-10-CM

## 2021-05-19 DIAGNOSIS — Z79.899: ICD-10-CM

## 2021-05-19 DIAGNOSIS — Z91.81: ICD-10-CM

## 2021-05-19 DIAGNOSIS — Z86.79: ICD-10-CM

## 2021-05-19 DIAGNOSIS — M51.16: ICD-10-CM

## 2021-05-19 DIAGNOSIS — J44.9: ICD-10-CM

## 2021-05-19 DIAGNOSIS — G31.84: ICD-10-CM

## 2021-05-19 DIAGNOSIS — I50.9: ICD-10-CM

## 2021-05-19 DIAGNOSIS — Z20.822: ICD-10-CM

## 2021-05-19 DIAGNOSIS — M47.26: ICD-10-CM

## 2021-05-19 DIAGNOSIS — J98.11: ICD-10-CM

## 2021-05-19 DIAGNOSIS — E11.22: ICD-10-CM

## 2021-05-19 DIAGNOSIS — E11.42: ICD-10-CM

## 2021-05-19 DIAGNOSIS — M48.061: ICD-10-CM

## 2021-05-19 DIAGNOSIS — G89.29: ICD-10-CM

## 2021-05-19 DIAGNOSIS — Z79.84: ICD-10-CM

## 2021-05-19 LAB
ALBUMIN SERPL-MCNC: 3.5 G/DL (ref 3.5–5)
ALP SERPL-CCNC: 79 U/L (ref 38–126)
ALT SERPL-CCNC: 11 U/L (ref 4–34)
ANION GAP SERPL CALC-SCNC: 7 MMOL/L
APTT BLD: 23.6 SEC (ref 22–30)
AST SERPL-CCNC: 22 U/L (ref 14–36)
BASOPHILS # BLD AUTO: 0 K/UL (ref 0–0.2)
BASOPHILS NFR BLD AUTO: 0 %
BUN SERPL-SCNC: 22 MG/DL (ref 7–17)
CALCIUM SPEC-MCNC: 9.4 MG/DL (ref 8.4–10.2)
CHLORIDE SERPL-SCNC: 106 MMOL/L (ref 98–107)
CO2 SERPL-SCNC: 27 MMOL/L (ref 22–30)
EOSINOPHIL # BLD AUTO: 0.3 K/UL (ref 0–0.7)
EOSINOPHIL NFR BLD AUTO: 4 %
ERYTHROCYTE [DISTWIDTH] IN BLOOD BY AUTOMATED COUNT: 4.11 M/UL (ref 3.8–5.4)
ERYTHROCYTE [DISTWIDTH] IN BLOOD: 12.3 % (ref 11.5–15.5)
GLUCOSE BLD-MCNC: 85 MG/DL (ref 75–99)
GLUCOSE SERPL-MCNC: 156 MG/DL (ref 74–99)
HCT VFR BLD AUTO: 36.6 % (ref 34–46)
HGB BLD-MCNC: 12.5 GM/DL (ref 11.4–16)
INR PPP: 1 (ref ?–1.2)
LYMPHOCYTES # SPEC AUTO: 1.2 K/UL (ref 1–4.8)
LYMPHOCYTES NFR SPEC AUTO: 19 %
MAGNESIUM SPEC-SCNC: 2 MG/DL (ref 1.6–2.3)
MCH RBC QN AUTO: 30.5 PG (ref 25–35)
MCHC RBC AUTO-ENTMCNC: 34.2 G/DL (ref 31–37)
MCV RBC AUTO: 89.1 FL (ref 80–100)
MONOCYTES # BLD AUTO: 0.6 K/UL (ref 0–1)
MONOCYTES NFR BLD AUTO: 9 %
NEUTROPHILS # BLD AUTO: 4.1 K/UL (ref 1.3–7.7)
NEUTROPHILS NFR BLD AUTO: 66 %
PH UR: 6.5 [PH] (ref 5–8)
PLATELET # BLD AUTO: 201 K/UL (ref 150–450)
POTASSIUM SERPL-SCNC: 3.8 MMOL/L (ref 3.5–5.1)
PROT SERPL-MCNC: 5.8 G/DL (ref 6.3–8.2)
PT BLD: 10.2 SEC (ref 9–12)
SODIUM SERPL-SCNC: 140 MMOL/L (ref 137–145)
SP GR UR: 1.01 (ref 1–1.03)
UROBILINOGEN UR QL STRIP: <2 MG/DL (ref ?–2)
WBC # BLD AUTO: 6.2 K/UL (ref 3.8–10.6)

## 2021-05-19 PROCEDURE — 83605 ASSAY OF LACTIC ACID: CPT

## 2021-05-19 PROCEDURE — 96361 HYDRATE IV INFUSION ADD-ON: CPT

## 2021-05-19 PROCEDURE — 81003 URINALYSIS AUTO W/O SCOPE: CPT

## 2021-05-19 PROCEDURE — 85652 RBC SED RATE AUTOMATED: CPT

## 2021-05-19 PROCEDURE — 85025 COMPLETE CBC W/AUTO DIFF WBC: CPT

## 2021-05-19 PROCEDURE — 71046 X-RAY EXAM CHEST 2 VIEWS: CPT

## 2021-05-19 PROCEDURE — 87040 BLOOD CULTURE FOR BACTERIA: CPT

## 2021-05-19 PROCEDURE — 85730 THROMBOPLASTIN TIME PARTIAL: CPT

## 2021-05-19 PROCEDURE — 80053 COMPREHEN METABOLIC PANEL: CPT

## 2021-05-19 PROCEDURE — 96360 HYDRATION IV INFUSION INIT: CPT

## 2021-05-19 PROCEDURE — 72131 CT LUMBAR SPINE W/O DYE: CPT

## 2021-05-19 PROCEDURE — 83036 HEMOGLOBIN GLYCOSYLATED A1C: CPT

## 2021-05-19 PROCEDURE — 93922 UPR/L XTREMITY ART 2 LEVELS: CPT

## 2021-05-19 PROCEDURE — 87635 SARS-COV-2 COVID-19 AMP PRB: CPT

## 2021-05-19 PROCEDURE — 83735 ASSAY OF MAGNESIUM: CPT

## 2021-05-19 PROCEDURE — 86140 C-REACTIVE PROTEIN: CPT

## 2021-05-19 PROCEDURE — 82607 VITAMIN B-12: CPT

## 2021-05-19 PROCEDURE — 36415 COLL VENOUS BLD VENIPUNCTURE: CPT

## 2021-05-19 PROCEDURE — 82746 ASSAY OF FOLIC ACID SERUM: CPT

## 2021-05-19 PROCEDURE — 99285 EMERGENCY DEPT VISIT HI MDM: CPT

## 2021-05-19 PROCEDURE — 71250 CT THORAX DX C-: CPT

## 2021-05-19 PROCEDURE — 84550 ASSAY OF BLOOD/URIC ACID: CPT

## 2021-05-19 PROCEDURE — 85610 PROTHROMBIN TIME: CPT

## 2021-05-19 PROCEDURE — 80048 BASIC METABOLIC PNL TOTAL CA: CPT

## 2021-05-19 PROCEDURE — 84484 ASSAY OF TROPONIN QUANT: CPT

## 2021-05-19 PROCEDURE — 84145 PROCALCITONIN (PCT): CPT

## 2021-05-19 PROCEDURE — 93005 ELECTROCARDIOGRAM TRACING: CPT

## 2021-05-19 RX ADMIN — AMPICILLIN SODIUM AND SULBACTAM SODIUM SCH MLS/HR: 2; 1 INJECTION, POWDER, FOR SOLUTION INTRAMUSCULAR; INTRAVENOUS at 20:28

## 2021-05-19 NOTE — ED
General Adult HPI





- General


Chief complaint: Weakness


Stated complaint: weakness


Time Seen by Provider: 21 14:40


Source: patient, EMS, RN notes reviewed, old records reviewed


Mode of arrival: EMS


Limitations: physical limitation





- History of Present Illness


Initial comments: 





This is a 80-year-old female presents emergency Department with a past medical 

history significant for diabetes hypertension high cholesterol heart disease 

CVA.  Patient comes in today stating that she is extremely weak and unable to 

ambulate today.  Patient also complains of the left second toe infection.  

Patient states she's been on antibiotics for 4 days and is not improving.  

Patient denies any recent injury or trauma.  Patient denies any fall.  Patient 

states she felt like she might even pass out today.  Patient denies any chest 

pain difficulty breathing or shortness of breath.  Patient denies any recent 

fever chills or cough per patient denies headache patient denies numbness or 

focal weakness.  Patient denies any abdominal pain patient denies nausea vomit

ing diarrhea.





- Related Data


                                Home Medications











 Medication  Instructions  Recorded  Confirmed


 


Simvastatin [Zocor] 80 mg PO HS 14


 


Carvedilol [Coreg] 12.5 mg PO BID 19


 


Repaglinide [Prandin] 1 mg PO TID 20


 


Dulaglutide [Trulicity] 0.75 mg SQ FR 20


 


Gabapentin [Neurontin] 300 mg PO BID 12/10/20 05/19/21


 


Sulfamethox-Tmp 800-160Mg [Bactrim 1 tab PO BID 21





-160 mg]   











                                    Allergies











Allergy/AdvReac Type Severity Reaction Status Date / Time


 


No Known Allergies Allergy   Verified 21 16:16














Review of Systems


ROS Statement: 


Those systems with pertinent positive or pertinent negative responses have been 

documented in the HPI.





ROS Other: All systems not noted in ROS Statement are negative.





Past Medical History


Past Medical History: Asthma, Heart Failure, COPD, CVA/TIA, Diabetes Mellitus, 

Hyperlipidemia, Hypertension, Memory Impairment, Osteoarthritis (OA), Pneumonia,

Skin Disorder, Syncope


Additional Past Medical History / Comment(s): NIDDM type II, neuropathy 

bilateral hands/legs/feet, past bilateral lower leg cellulitis/wound, bilateral 

lower leg edema at times, bronchitis, TIA x 2, occasional low back pain, chronic

L leg pain since fall approximately one year ago-pt states physician has run 

tests on leg but no answer why it is painful, L foot gout.


History of Any Multi-Drug Resistant Organisms: None Reported


Past Surgical History: Bladder Surgery, Hysterectomy, Tonsillectomy


Additional Past Surgical History / Comment(s): Cystocele, R leg vein stripping, 

pain clinic procedures, bilateral cataract removals, colonoscopy, teeth 

extracted.


Past Anesthesia/Blood Transfusion Reactions: No Reported Reaction, Motion 

Sickness


Past Psychological History: Anxiety


Smoking Status: Former smoker


Past Alcohol Use History: None Reported


Past Drug Use History: None Reported





- Past Family History


  ** Father


Family Medical History: No Reported History


Additional Family Medical History / Comment(s): Father was healthy.  He lived to

be 87yrs old.





  ** Mother


Family Medical History: Diabetes Mellitus


Additional Family Medical History / Comment(s): Mother  of diabetic complica

tions at the age of 61 yrs.





General Exam





- General Exam Comments


Initial Comments: 





GENERAL:


Patient is well-developed and well-nourished.  Patient is nontoxic and well-

hydrated and is in mild distress.





ENT:


Neck is soft and supple.  No significant lymphadenopathy is noted.  Oropharynx 

is clear.  Moist mucous membranes.  Neck has full range of motion without 

eliciting any pain.  





EYES:


The sclera were anicteric and conjunctiva were pink and moist.  Extraocular 

movements were intact and pupils were equal round and reactive to light.  

Eyelids were unremarkable.





PULMONARY:


Unlabored respirations.  Good breath sounds bilaterally.  No audible rales 

rhonchi or wheezing was noted.





CARDIOVASCULAR:


There is a regular rate and rhythm without any murmurs gallops or rubs.  





ABDOMEN:


Soft and nontender with normal bowel sounds.  No palpable organomegaly was 

noted.  There is no palpable pulsatile mass.





SKIN:


Patient's second left toe is very red consistent with cellulitis and tender.





NEUROLOGIC:


Patient is alert and oriented x3.  Cranial nerves II through XII are grossly 

intact.  Motor and sensory are also intact.  Normal speech, volume and content. 

Symmetrical smile.  





MUSCULOSKELETAL:


Normal extremities with adequate strength and full range of motion.  





LYMPHATICS:


No significant lymphadenopathy is noted





PSYCHIATRIC:


Normal psychiatric evaluation.  


Limitations: physical limitation





Course


                                   Vital Signs











  21





  14:39


 


Temperature 97.9 F


 


Pulse Rate 64


 


Respiratory 18





Rate 


 


Blood Pressure 112/70


 


O2 Sat by Pulse 95





Oximetry 














Medical Decision Making





- Medical Decision Making





EKG shows sinus rhythm at a rate of 77 bpm NJ interval is 246 QRS is 86 QT 

interval is 416 QTC is 470.  Patient's EKG shows no ST segment elevation or 

depression.





I will begin to reevaluate the patient she was still unable to ambulate on her 

own.  Patient states she's much weaker than normal.  I spoke with Dr. Pate 

he agreed to admit the patient admitted the patient wrote admitting orders





- Lab Data


Result diagrams: 


                                 21 15:12





                                 21 15:12


                                   Lab Results











  21 Range/Units





  15:12 15:12 15:12 


 


WBC  6.2    (3.8-10.6)  k/uL


 


RBC  4.11    (3.80-5.40)  m/uL


 


Hgb  12.5    (11.4-16.0)  gm/dL


 


Hct  36.6    (34.0-46.0)  %


 


MCV  89.1    (80.0-100.0)  fL


 


MCH  30.5    (25.0-35.0)  pg


 


MCHC  34.2    (31.0-37.0)  g/dL


 


RDW  12.3    (11.5-15.5)  %


 


Plt Count  201    (150-450)  k/uL


 


MPV  7.9    


 


Neutrophils %  66    %


 


Lymphocytes %  19    %


 


Monocytes %  9    %


 


Eosinophils %  4    %


 


Basophils %  0    %


 


Neutrophils #  4.1    (1.3-7.7)  k/uL


 


Lymphocytes #  1.2    (1.0-4.8)  k/uL


 


Monocytes #  0.6    (0-1.0)  k/uL


 


Eosinophils #  0.3    (0-0.7)  k/uL


 


Basophils #  0.0    (0-0.2)  k/uL


 


PT   10.2   (9.0-12.0)  sec


 


INR   1.0   (<1.2)  


 


APTT   23.6   (22.0-30.0)  sec


 


Sodium     (137-145)  mmol/L


 


Potassium     (3.5-5.1)  mmol/L


 


Chloride     ()  mmol/L


 


Carbon Dioxide     (22-30)  mmol/L


 


Anion Gap     mmol/L


 


BUN     (7-17)  mg/dL


 


Creatinine     (0.52-1.04)  mg/dL


 


Est GFR (CKD-EPI)AfAm     (>60 ml/min/1.73 sqM)  


 


Est GFR (CKD-EPI)NonAf     (>60 ml/min/1.73 sqM)  


 


Glucose     (74-99)  mg/dL


 


Plasma Lactic Acid Jorge     (0.7-2.0)  mmol/L


 


Calcium     (8.4-10.2)  mg/dL


 


Magnesium     (1.6-2.3)  mg/dL


 


Total Bilirubin     (0.2-1.3)  mg/dL


 


AST     (14-36)  U/L


 


ALT     (4-34)  U/L


 


Alkaline Phosphatase     ()  U/L


 


Troponin I     (0.000-0.034)  ng/mL


 


Total Protein     (6.3-8.2)  g/dL


 


Albumin     (3.5-5.0)  g/dL


 


Urine Color    Light Yellow  


 


Urine Appearance    Clear  (Clear)  


 


Urine pH    6.5  (5.0-8.0)  


 


Ur Specific Gravity    1.006  (1.001-1.035)  


 


Urine Protein    Negative  (Negative)  


 


Urine Glucose (UA)    Negative  (Negative)  


 


Urine Ketones    Negative  (Negative)  


 


Urine Blood    Negative  (Negative)  


 


Urine Nitrite    Negative  (Negative)  


 


Urine Bilirubin    Negative  (Negative)  


 


Urine Urobilinogen    <2.0  (<2.0)  mg/dL


 


Ur Leukocyte Esterase    Negative  (Negative)  














  21 Range/Units





  15:12 15:12 15:12 


 


WBC     (3.8-10.6)  k/uL


 


RBC     (3.80-5.40)  m/uL


 


Hgb     (11.4-16.0)  gm/dL


 


Hct     (34.0-46.0)  %


 


MCV     (80.0-100.0)  fL


 


MCH     (25.0-35.0)  pg


 


MCHC     (31.0-37.0)  g/dL


 


RDW     (11.5-15.5)  %


 


Plt Count     (150-450)  k/uL


 


MPV     


 


Neutrophils %     %


 


Lymphocytes %     %


 


Monocytes %     %


 


Eosinophils %     %


 


Basophils %     %


 


Neutrophils #     (1.3-7.7)  k/uL


 


Lymphocytes #     (1.0-4.8)  k/uL


 


Monocytes #     (0-1.0)  k/uL


 


Eosinophils #     (0-0.7)  k/uL


 


Basophils #     (0-0.2)  k/uL


 


PT     (9.0-12.0)  sec


 


INR     (<1.2)  


 


APTT     (22.0-30.0)  sec


 


Sodium  140    (137-145)  mmol/L


 


Potassium  3.8    (3.5-5.1)  mmol/L


 


Chloride  106    ()  mmol/L


 


Carbon Dioxide  27    (22-30)  mmol/L


 


Anion Gap  7    mmol/L


 


BUN  22 H    (7-17)  mg/dL


 


Creatinine  1.71 H    (0.52-1.04)  mg/dL


 


Est GFR (CKD-EPI)AfAm  32    (>60 ml/min/1.73 sqM)  


 


Est GFR (CKD-EPI)NonAf  28    (>60 ml/min/1.73 sqM)  


 


Glucose  156 H    (74-99)  mg/dL


 


Plasma Lactic Acid Jorge   1.2   (0.7-2.0)  mmol/L


 


Calcium  9.4    (8.4-10.2)  mg/dL


 


Magnesium  2.0    (1.6-2.3)  mg/dL


 


Total Bilirubin  0.5    (0.2-1.3)  mg/dL


 


AST  22    (14-36)  U/L


 


ALT  11    (4-34)  U/L


 


Alkaline Phosphatase  79    ()  U/L


 


Troponin I    <0.012  (0.000-0.034)  ng/mL


 


Total Protein  5.8 L    (6.3-8.2)  g/dL


 


Albumin  3.5    (3.5-5.0)  g/dL


 


Urine Color     


 


Urine Appearance     (Clear)  


 


Urine pH     (5.0-8.0)  


 


Ur Specific Gravity     (1.001-1.035)  


 


Urine Protein     (Negative)  


 


Urine Glucose (UA)     (Negative)  


 


Urine Ketones     (Negative)  


 


Urine Blood     (Negative)  


 


Urine Nitrite     (Negative)  


 


Urine Bilirubin     (Negative)  


 


Urine Urobilinogen     (<2.0)  mg/dL


 


Ur Leukocyte Esterase     (Negative)  














Disposition


Clinical Impression: 


 Cellulitis, toe, Generalized weakness





Disposition: ADMITTED AS IP TO THIS hospitals


Referrals: 


Poli Pate MD [Primary Care Provider] - 1-2 days


Time of Disposition: 18:52

## 2021-05-19 NOTE — XR
EXAMINATION TYPE: XR toes LT

 

DATE OF EXAM: 5/19/2021

 

COMPARISON: 8/4/2020

 

HISTORY: Osteomyelitis

 

TECHNIQUE: AP and lateral views of the toes were obtained

 

FINDINGS: There there is diffuse narrowing of the DIP joints of the second through fifth digits. Mild
 hallux valgus deformity of the metacarpal phalangeal joint of the first digit. Mild hammertoe deform
ities are seen.

 

IMPRESSION: Multiple degenerative changes of the toes of the left foot mild hallux valgus deformity o
f the metacarpal phalangeal joint of the first digit. Mild hammertoe deformities are seen.. No defini
te evidence of fracture or dislocation of the toes of the left foot.

## 2021-05-19 NOTE — XR
EXAMINATION TYPE: XR chest 2V

 

DATE OF EXAM: 5/19/2021

 

COMPARISON: 12/11/2020

 

HISTORY: Weakness 

 

TECHNIQUE:  Frontal and lateral views of the chest are obtained.

 

FINDINGS:  Heart size is enlarged. Multiple overlying leads. Mild bibasilar airspace opacity suggesti
ve of atelectasis or developing pneumonia with probable small pleural effusions. No pneumothorax.

 

IMPRESSION:  

1. Cardiomegaly. 

2. Mild bibasilar airspace opacities suggestive of atelectasis or developing pneumonia. Probable smal
l pleural effusions.

## 2021-05-20 LAB
GLUCOSE BLD-MCNC: 104 MG/DL (ref 75–99)
GLUCOSE BLD-MCNC: 133 MG/DL (ref 75–99)
GLUCOSE BLD-MCNC: 145 MG/DL (ref 75–99)
GLUCOSE BLD-MCNC: 63 MG/DL (ref 75–99)
GLUCOSE BLD-MCNC: 69 MG/DL (ref 75–99)
GLUCOSE BLD-MCNC: 82 MG/DL (ref 75–99)
GLUCOSE BLD-MCNC: 97 MG/DL (ref 75–99)

## 2021-05-20 PROCEDURE — B44HZZZ ULTRASONOGRAPHY OF BILATERAL LOWER EXTREMITY ARTERIES: ICD-10-PCS

## 2021-05-20 RX ADMIN — REPAGLINIDE SCH: 1 TABLET ORAL at 23:03

## 2021-05-20 RX ADMIN — REPAGLINIDE SCH MG: 1 TABLET ORAL at 16:55

## 2021-05-20 RX ADMIN — ATORVASTATIN CALCIUM SCH: 40 TABLET, FILM COATED ORAL at 23:02

## 2021-05-20 RX ADMIN — AMPICILLIN SODIUM AND SULBACTAM SODIUM SCH MLS/HR: 2; 1 INJECTION, POWDER, FOR SOLUTION INTRAMUSCULAR; INTRAVENOUS at 11:59

## 2021-05-20 RX ADMIN — AMPICILLIN SODIUM AND SULBACTAM SODIUM SCH MLS/HR: 2; 1 INJECTION, POWDER, FOR SOLUTION INTRAMUSCULAR; INTRAVENOUS at 00:29

## 2021-05-20 RX ADMIN — HYDROCODONE BITARTRATE AND ACETAMINOPHEN PRN EACH: 5; 325 TABLET ORAL at 12:03

## 2021-05-20 RX ADMIN — REPAGLINIDE SCH MG: 1 TABLET ORAL at 07:40

## 2021-05-20 RX ADMIN — AMPICILLIN SODIUM AND SULBACTAM SODIUM SCH MLS/HR: 2; 1 INJECTION, POWDER, FOR SOLUTION INTRAMUSCULAR; INTRAVENOUS at 05:23

## 2021-05-20 RX ADMIN — GABAPENTIN SCH MG: 300 CAPSULE ORAL at 07:39

## 2021-05-20 NOTE — P.CNNES
History of Present Illness


Consult date: 21


Requesting physician: Poli Pate


Reason for Consult: Leg weakness


History of Present Illness: 





Patient is a 80-year-old female came to the hospital by ambulance yesterday at 2

PM came for extremely weakness and unable to ambulate.  Patient also complains 

of left second toe infection.  Patient states that she came to the hospital 

because her feet are messed up for good one year.  It hurts all the time.  

However when she walks on her feet, they hurt more.  She would be standing for 5

minutes and the pain gets worse.  Also legs gets weak.  Also has numbness in the

bottom of feet.  She denies any low back pain at this time.  Patient states that

when she is in too much pain, then she passes out, for "a few seconds".  As per 

EMS flow sheet when they arrived, patient was under care of the neighbors 

stating that she may have passed out.  She thinks her blood sugar might be low. 

Capillary blood glucose was normal at that time.  The neighbors has mentioned 

that patient was seemingly confused and typically experiences confusion and 

events of "passing out" when she has a bad UTI.  Patient was alert and oriented 

2, confused, unable to answer questions.  While patient was being transferred 

to the ambulance, patient came unresponsive for approximately 15 seconds.  She 

slumps to the right side, speech is garbled and inappropriately, then regains 

consciousness and speaks clearly.  Patient's blood pressure was 150/98, pulse 

rate 81, respiration 18 saturation 97% temperature 98.5.





Vital signs on arrival blood pressure 112/70, pulse rate 64 temperature 97.9.  

Blood test shows normal CBC, PT/PTT, electrolytes are normal, BUN 22, creatinine

1.71.  Hepatic panel is normal.  Troponin negative.  UA negative, corona virus 

PCR negative.  Her previous rheumatoid factor, CCP, ZOILA, RPR are negative from 

2019.  B12 was 606 on 2019.  Hemoglobin A1c 8.4 2020.  Serum protein

electrophoresis shows hypogammaglobinemia, although not seen in this study, a 

free light chain paraprotein cannot be excluded.  Chest x-ray showed 

cardiomegaly.  Mild bibasilar airspace opacities suggestive of atelectasis or 

developing pneumonia.  Probable small pleural effusion.  EKG shows sinus rhythm 

with marked sinus arrhythmia with first-degree AV block.  Left axis deviation.  

Patient had an MRI of lumbar spine on 2020 which revealed degenerative 

first-degree L4 5 spondylolisthesis with severe L4 5 bony spinal stenosis 

related to facet arthropathy and subluxation deformity.  No fracture.  I 

reviewed the films, and agree with evidence of severe lumbar spinal stenosis at 

L4 5 level.  2-D echo from 2020 showed normal left-ventricular size, 

moderate concentric LVH, EF is 60-65%.  Mild aortic valve sclerosis.  Mild 

aortic regurgitation.  Carotid Doppler from 2020 was normal.  Antegrade 

flow in vertebral arteries.





Patient has been seen by neurology Dr. Wells on 2019 for bilateral lower 

extremity numbness.  It was felt to be related to neuropathy from diabetes, 

confounded by lumbar radiculopathies and stenosis.  She used to walk with a 

cane.  Patient was on Lyrica and was switched to gabapentin by Dr. Wells.





Patient states that her daughter lives with her, she uses walker sometimes, also

has a cane.  She uses one of the gait assistive device most of the time.  

Patient states her hands also hurt a lot.  She believes the hand pain is due to 

use of hands for getting up.  She has diabetes for last 4-5 years.  Denies any 

alcohol.  She smoked 3 packs per day for 50 years, quit 7 years ago.  She states

her neck hurts now and then when it is becomes crooked on a pillow.  No 

significant neck pain.











Review of Systems





As above in detail.  Patient denies any chest pain, shortness of breath wheezing

or cough.  Denies any double vision loss of vision, hoarseness, sore throat, 

dysphagia.  Denies any fever or chills.





Past Medical History


Past Medical History: Asthma, Heart Failure, COPD, CVA/TIA, Diabetes Mellitus, 

Hyperlipidemia, Hypertension, Memory Impairment, Osteoarthritis (OA), Pneumonia,

Skin Disorder, Syncope


Additional Past Medical History / Comment(s): NIDDM type II, neuropathy bi

lateral hands/legs/feet, past bilateral lower leg cellulitis/wound, bilateral 

lower leg edema at times, bronchitis, TIA x 2, occasional low back pain, chronic

L leg pain since fall approximately one year ago-pt states physician has run 

tests on leg but no answer why it is painful, L foot gout.


History of Any Multi-Drug Resistant Organisms: None Reported


Past Surgical History: Bladder Surgery, Hysterectomy, Tonsillectomy


Additional Past Surgical History / Comment(s): Cystocele, R leg vein stripping, 

pain clinic procedures, bilateral cataract removals, colonoscopy, teeth 

extracted.


Past Anesthesia/Blood Transfusion Reactions: No Reported Reaction, Motion 

Sickness


Past Psychological History: Anxiety


Additional Psychological History / Comment(s): Pt has an adult daughter who 

resides with her.  Pt uses a cane or walker to ambulate. She states she has 

someone from [place] who comes and sits with her sometimes.  She has difficulty 

with showering.  She drives [employer], her jose david cane take her to appointments 

sometimes.


Smoking Status: Former smoker


Past Alcohol Use History: None Reported


Additional Past Alcohol Use History / Comment(s): Pt started smoking in 1962 and

quit in 


Past Drug Use History: None Reported





- Past Family History


  ** Father


Family Medical History: No Reported History


Additional Family Medical History / Comment(s): Father was healthy.  He lived to

be 87yrs old.





  ** Mother


Family Medical History: Diabetes Mellitus


Additional Family Medical History / Comment(s): Mother  of diabetic 

complications at the age of 61 yrs.





Medications and Allergies


                                Home Medications











 Medication  Instructions  Recorded  Confirmed  Type


 


Simvastatin [Zocor] 80 mg PO HS 14 History


 


Carvedilol [Coreg] 12.5 mg PO BID 19 History


 


Repaglinide [Prandin] 1 mg PO TID 20 History


 


Dulaglutide [Trulicity] 0.75 mg SQ FR 20 History


 


Gabapentin [Neurontin] 300 mg PO BID 12/10/20 05/19/21 History


 


Sulfamethox-Tmp 800-160Mg [Bactrim 1 tab PO BID 21 History





-160 mg]    








                                    Allergies











Allergy/AdvReac Type Severity Reaction Status Date / Time


 


No Known Allergies Allergy   Verified 21 16:16














Physical Examination





- Vital Signs


Vital Signs: 


                                   Vital Signs











  Temp Pulse Pulse Resp BP BP Pulse Ox


 


 21 04:58  98.4 F   73  18   136/60  94 L


 


 21 22:33  97.7 F   61  16   149/75  94 L


 


 21 22:05  98.0 F      


 


 21 21:46     18   


 


 21 19:04   60   18  130/57   94 L


 


 21 14:39  97.9 F  64   18  112/70   95








                                Intake and Output











 21





 22:59 06:59 14:59


 


Intake Total  950 120


 


Balance  950 120


 


Intake:   


 


  Intake, IV Titration  450 





  Amount   


 


    Sodium Chloride 0.9% 1,  450 





    000 ml @ 75 mls/hr IV .   





    [id number] Rx#:[id number]   


 


  Oral  500 120


 


Other:   


 


  Voiding Method  Toilet 





  Bedpan 





  Diaper 


 


  # Voids  2 


 


  Weight 68.039 kg  














Patient is an elderly  female, in no acute distress.  However she is 

very sensitive about touching her feet or toes.


Patient is alert awake oriented to time place and person.  Speech and language 

functions are normal.  Attention, concentration and fund of knowledge is 

adequate.





On cranial examination, pupils are round and reacting to light, visual fields 

are full on confrontation, extraocular muscles are intact with no nystagmus.  

Face is symmetric, tongue protrudes to the midline.  Palatal elevation and 

sensation normal, hearing and shoulder shrug normal, facial sensation normal.  

Shoulder shrug normal.





On muscle strength testing, there is no pronator drift and the strength is 

normal in arms proximally in deltoid biceps and triceps,  are 4+ to 5-

bilaterally with decreased endurance.  In the lower extremities hip flexion is 4

on the right, 5- on the left.  Knee extension is normal, ankle dorsiflexion, 

inversion and peroneal appears normal although patient was guarding a lot 

because of pain.  Her right first MTP joint is very swollen, tender.  Left 

second toe is very swollen.  Suspect gout.





Deep tendon reflexes are 2 in the upper limbs at biceps triceps and 

brachioradialis,, 2+ at the knees 1+ ankles.  Plantars are flat. 





Sensory to touch is equal with no neglect.





Cerebellar function showed no ataxia for finger-to-nose testing.  No 

dysdiadochokinesia.  Tone and bulk of muscles normal.





Gait deferred.





On general examination, there is no carotid bruit or murmur, S1-S2 audible.  

Abdomen is soft nontender.  Chest is clear.  No peripheral edema.  Peripheral 

pulses could not be felt on either sides.  Her first MTP joint of the right foot

is very swollen.  Her left PIP and DIP joint is swollen.





Results





- Laboratory Findings


CBC and BMP: 


                                 21 15:12





                                 21 15:12


Abnormal Lab Findings: 


                                  Abnormal Labs











  21





  15:12 00:04 06:53


 


BUN  22 H  


 


Creatinine  1.71 H  


 


Glucose  156 H  


 


POC Glucose (mg/dL)   145 H  133 H


 


Total Protein  5.8 L  














Assessment and Plan


Assessment: 





* Bilateral feet pain, with evidence of swelling of right first MTP joint, and 

  the PIP and DIP joint of the left second toe.  Probable gouty arthritis with 

  exacerbation.  Rule out underlying peripheral neuropathy.


* History of severe lumbar spinal stenosis at L4-5 level as per MRI from 

  2020.  Patient has not had any back surgery.  Patient does not have 

  significant back pain or radicular symptoms at this time.


* Decreased peripheral pulses, rule out PAD.


* Probable Gout.  Patient has elevated uric acid 8.0/7.4 on 2020.


* Diabetes


* X tobacco use


Plan: 





* We will check uric acid.  Suggest treatment for possible acute exacerbation of

  gout.


* Arterial Doppler of lower extremities to rule out PAD.


* Hemoglobin A1c


* Increased Neurontin to 300 mg 3 times a day.


* We will follow.

## 2021-05-20 NOTE — CT
EXAMINATION TYPE: CT lumbar spine wo con

 

DATE OF EXAM: 5/20/2021

 

COMPARISON: 9/25/2019

 

HISTORY: Generalized bilateral leg weakness.

 

CT DLP: 1025.4 mGycm

 

 

Unenhanced CT of the lumbar spine was performed.  Bone and soft tissue window settings are submitted 
as well as coronal and sagittal reconstructions. 

 

L1-L2: Mild degenerative disc space narrowing. Posterior disc bulge with partially encapsulating spur
. No evidence for disc herniation protrusion or central stenosis. Mild facet joint arthropathy. Ventr
al spondylosis.  

 

L2-L3: Mild degenerative disc space narrowing. Posterior disc bulge with partially encapsulating spur
. No evidence for disc herniation protrusion or central stenosis. Mild facet joint arthropathy. Ventr
al spondylosis.    

 

L3-L4: Mild degenerative disc space narrowing. Posterior disc bulge with partially encapsulating spur
. No evidence for disc herniation protrusion or central stenosis. Mild facet joint arthropathy. Ventr
al spondylosis.    

 

L4-L5: Moderate degenerative disc space narrowing. Grade 1 anterolisthesis unchanged from prior study
. Anterolisthesis measures 4.4 mm. There is distortion of the thecal sac with moderate central stenos
is. Severe facet arthropathy. Bilateral foraminal encroachment. 

 

L5-S1: Moderate disc desiccation. Posterior disc bulge with encapsulating spur. Effacement of the alex
tral thecal sac with bilateral lateral recess stenosis left greater than right. No barbara central sten
osis.  

 

No paraspinal masses are identified.  Lumbar segments are free if fracture.

 

IMPRESSION:

 

1. Stable appearance of the lumbar spine multilevel degenerative disc disease and disc endplate compl
ex. Moderate central stenosis L4-5 is unchanged. Bilateral lateral recess stenosis L5-S1 unchanged as
 well.

## 2021-05-20 NOTE — HP
HISTORY AND PHYSICAL



This is a 80-year-old white female who was admitted, unable to move her legs, history

of diabetes, hypertension, hypercholesterolemia, CVA extremely weak and unable to

ambulate.



Middle and 2nd toe, possibly infected.  She has been on antibiotics 4 days without

improvement.  She feels that she might even pass out.  She has no difficulty with chest

pain, shortness of breath.  No cough, fever or chills.  No nausea, vomiting, diarrhea.



Home medicines: Zocor 80 daily, Coreg 12.5 b.i.d. Prandin 1 mg t.i.d., Trulicity 0.75

mg once a week, Neurontin 300 mg b.i.d.,  Bactrim Double Strength b.i.d.



ALLERGIES:

No known drug allergies.



REVIEW OF SYSTEMS:

Fourteen-point review of systems negative except for mentioned in HPI.



PAST MEDICAL HISTORY:

Asthma, heart failure, COPD, CVA, TIA, diabetes mellitus, dyslipidemia, hypertension,

memory impairment, osteoarthritis, skin disorder, pneumonia.



SURGERIES:

Bladder surgery, hysterectomy, tonsillectomy.



FAMILY HISTORY:

Mother diabetes mellitus.



PHYSICAL EXAMINATION:

Well-nourished, well-hydrated, white female in no acute distress.

ENT: External ear canals within normal limits.  Pupils equal, round, reactive to light

and accommodation.

LUNGS are clear.

HEART S1, S2.

ABDOMEN is soft, nontender.

EXTREMITIES: 3 out of 5 strength bilaterally.  Left 2nd toe red, consistent tender and

swollen, and the left 2nd toe internal coin on inside of the foot.

NEUROLOGIC: Cranial nerves appear to be intact except for leg weakness bilaterally.

PSYCH: Fair mood and affect. Temperature 97.9, pulse 64, respiratory rate 16 to 18,

blood pressure 112/70, O2 95.



EKG sinus rhythm.



LABORATORY DATA:

Labs reviewed.  BUN 22, creatinine 1.71.  UA is negative.  White count is normal.

Troponins negative.



ASSESSMENT:

1. Cellulitis of the foot/toe diabetic  wound infection.

2. Generalized weakness, unable to move the legs.

3. Get Neurology and possibly lumbar epidural.

4. Treat with IV antibiotics.

5. PT/OT.





MMODL / IJN: [id number] / Job#: [id number]

## 2021-05-21 LAB
ALBUMIN SERPL-MCNC: 3.6 G/DL (ref 3.8–4.9)
ALBUMIN/GLOB SERPL: 1.89 G/DL (ref 1.6–3.17)
ALP SERPL-CCNC: 80 U/L (ref 41–126)
ALT SERPL-CCNC: 11 U/L (ref 8–44)
ANION GAP SERPL CALC-SCNC: 9.8 MMOL/L (ref 4–12)
AST SERPL-CCNC: 22 U/L (ref 13–35)
BASOPHILS # BLD AUTO: 0 K/UL (ref 0–0.2)
BASOPHILS NFR BLD AUTO: 1 %
BUN SERPL-SCNC: 20 MG/DL (ref 9–27)
BUN/CREAT SERPL: 14.29 RATIO (ref 12–20)
CALCIUM SPEC-MCNC: 9.1 MG/DL (ref 8.7–10.3)
CHLORIDE SERPL-SCNC: 109 MMOL/L (ref 96–109)
CO2 SERPL-SCNC: 24.2 MMOL/L (ref 21.6–31.8)
EOSINOPHIL # BLD AUTO: 0.4 K/UL (ref 0–0.7)
EOSINOPHIL NFR BLD AUTO: 6 %
ERYTHROCYTE [DISTWIDTH] IN BLOOD BY AUTOMATED COUNT: 4.13 M/UL (ref 3.8–5.4)
ERYTHROCYTE [DISTWIDTH] IN BLOOD: 12.3 % (ref 11.5–15.5)
GLOBULIN SER CALC-MCNC: 1.9 G/DL (ref 1.6–3.3)
GLUCOSE BLD-MCNC: 117 MG/DL (ref 75–99)
GLUCOSE BLD-MCNC: 125 MG/DL (ref 75–99)
GLUCOSE BLD-MCNC: 88 MG/DL (ref 75–99)
GLUCOSE BLD-MCNC: 95 MG/DL (ref 75–99)
GLUCOSE SERPL-MCNC: 110 MG/DL (ref 70–110)
HBA1C MFR BLD: 6.2 % (ref 4–6)
HCT VFR BLD AUTO: 37.4 % (ref 34–46)
HGB BLD-MCNC: 12.7 GM/DL (ref 11.4–16)
LYMPHOCYTES # SPEC AUTO: 1.3 K/UL (ref 1–4.8)
LYMPHOCYTES NFR SPEC AUTO: 20 %
MCH RBC QN AUTO: 30.6 PG (ref 25–35)
MCHC RBC AUTO-ENTMCNC: 33.8 G/DL (ref 31–37)
MCV RBC AUTO: 90.5 FL (ref 80–100)
MONOCYTES # BLD AUTO: 0.6 K/UL (ref 0–1)
MONOCYTES NFR BLD AUTO: 9 %
NEUTROPHILS # BLD AUTO: 4 K/UL (ref 1.3–7.7)
NEUTROPHILS NFR BLD AUTO: 63 %
PLATELET # BLD AUTO: 202 K/UL (ref 150–450)
POTASSIUM SERPL-SCNC: 4 MMOL/L (ref 3.5–5.5)
PROT SERPL-MCNC: 5.5 G/DL (ref 6.2–8.2)
SODIUM SERPL-SCNC: 143 MMOL/L (ref 135–145)
WBC # BLD AUTO: 6.3 K/UL (ref 3.8–10.6)

## 2021-05-21 RX ADMIN — INDOMETHACIN SCH MG: 25 CAPSULE ORAL at 07:43

## 2021-05-21 RX ADMIN — INDOMETHACIN SCH MG: 25 CAPSULE ORAL at 21:16

## 2021-05-21 RX ADMIN — GABAPENTIN SCH MG: 300 CAPSULE ORAL at 07:43

## 2021-05-21 RX ADMIN — GABAPENTIN SCH: 300 CAPSULE ORAL at 00:21

## 2021-05-21 RX ADMIN — ATORVASTATIN CALCIUM SCH MG: 40 TABLET, FILM COATED ORAL at 21:15

## 2021-05-21 RX ADMIN — REPAGLINIDE SCH MG: 1 TABLET ORAL at 17:58

## 2021-05-21 RX ADMIN — REPAGLINIDE SCH MG: 1 TABLET ORAL at 21:16

## 2021-05-21 RX ADMIN — GABAPENTIN SCH MG: 300 CAPSULE ORAL at 21:16

## 2021-05-21 RX ADMIN — REPAGLINIDE SCH MG: 1 TABLET ORAL at 07:44

## 2021-05-21 RX ADMIN — HYDROCODONE BITARTRATE AND ACETAMINOPHEN PRN EACH: 5; 325 TABLET ORAL at 03:09

## 2021-05-21 RX ADMIN — INDOMETHACIN SCH: 25 CAPSULE ORAL at 00:21

## 2021-05-21 RX ADMIN — INDOMETHACIN SCH MG: 25 CAPSULE ORAL at 17:58

## 2021-05-21 NOTE — P.CNPUL
History of Present Illness


Consult date: 21


Reason for consult: dyspnea


Chief complaint: Shortness of breath and abnormal chest x-ray


History of present illness: 





This is a pleasant 76-year-old female seen evaluated examined on fifth floor, 

patient came into the hospital with generalized weakness unable to ambulate as 

well as left second toe infection, patient has been poorly responsive with oral 

antibiotics, because of dizziness lightheadedness and feeling weak came into the

hospital, her prior history significant for dyslipidemia hypertension 

hypertensive cardiovascular disease and peripheral neuropathy patient has been 

on oral Bactrim, she used to smoke in the past quit several years ago, left foot

x-ray negative for dislocation and fracture, chest x-ray significant for 

developing bilateral infiltrate likely pneumonia and small effusion cannot be 

excluded, she was noted to have a normal CBC and chemistry significant for 

slightly elevated creatinine of 1.4, she has been on IV Unasyn 1 dose has been 

given now off of Unasyn, patient is suspected to have a gout was started on 

colchicine and Indocin of the 4 to will check computed tomography scan of the 

chest noncontrast to evaluate for pneumonia versus atelectasis





Review of Systems


All systems: negative





Past Medical History


Past Medical History: Asthma, Heart Failure, COPD, CVA/TIA, Diabetes Mellitus, 

Hyperlipidemia, Hypertension, Memory Impairment, Osteoarthritis (OA), Pneumonia,

Skin Disorder, Syncope


Additional Past Medical History / Comment(s): NIDDM type II, neuropathy 

bilateral hands/legs/feet, past bilateral lower leg cellulitis/wound, bilateral 

lower leg edema at times, bronchitis, TIA x 2, occasional low back pain, chronic

L leg pain since fall approximately one year ago-pt states physician has run 

tests on leg but no answer why it is painful, L foot gout.


History of Any Multi-Drug Resistant Organisms: None Reported


Past Surgical History: Bladder Surgery, Hysterectomy, Tonsillectomy


Additional Past Surgical History / Comment(s): Cystocele, R leg vein stripping, 

pain clinic procedures, bilateral cataract removals, colonoscopy, teeth 

extracted.


Past Anesthesia/Blood Transfusion Reactions: No Reported Reaction, Motion 

Sickness


Past Psychological History: Anxiety


Additional Psychological History / Comment(s): Pt has an adult daughter who 

resides with her.  Pt uses a cane or walker to ambulate. She states she has s

omeone from [place] who comes and sits with her sometimes.  She has difficulty 

with showering.  She drives alittle, her jose david cane take her to appointments 

sometimes.


Smoking Status: Former smoker


Past Alcohol Use History: None Reported


Additional Past Alcohol Use History / Comment(s): Pt started smoking in 1962 and

quit in 


Past Drug Use History: None Reported





- Past Family History


  ** Father


Family Medical History: No Reported History


Additional Family Medical History / Comment(s): Father was healthy.  He lived to

be 87yrs old.





  ** Mother


Family Medical History: Diabetes Mellitus


Additional Family Medical History / Comment(s): Mother  of diabetic 

complications at the age of 61 yrs.





Medications and Allergies


                                Home Medications











 Medication  Instructions  Recorded  Confirmed  Type


 


Simvastatin [Zocor] 80 mg PO HS 14 History


 


Carvedilol [Coreg] 12.5 mg PO BID 19 History


 


Repaglinide [Prandin] 1 mg PO TID 20 History


 


Dulaglutide [Trulicity] 0.75 mg SQ FR 20 History


 


Gabapentin [Neurontin] 300 mg PO BID 12/10/20 05/19/21 History


 


Sulfamethox-Tmp 800-160Mg [Bactrim 1 tab PO BID 21 History





-160 mg]    








                                    Allergies











Allergy/AdvReac Type Severity Reaction Status Date / Time


 


No Known Allergies Allergy   Verified 21 16:16














Physical Exam


Vitals: 


                                   Vital Signs











  Temp Pulse Resp BP Pulse Ox


 


 21 12:08  97.8 F  68  17  151/55  95


 


 21 04:43  97.6 F  87  18  156/67  92 L


 


 21 20:52  98 F  82  16  124/60  96








                                Intake and Output











 21





 06:59 14:59 22:59


 


Intake Total  480 


 


Balance  480 


 


Intake:   


 


  Oral  480 


 


Other:   


 


  # Voids 1  4


 


  Weight 69.5 kg  














- Constitutional


General appearance: cooperative, disheveled





- EENT


Eyes: PERRLA


ENT: hard of hearing


Ears: bilateral: normal





- Neck


Carotids: bilateral: upstroke normal


Thyroid: bilateral: normal size





- Respiratory


Respiratory: bilateral: CTA





- Cardiovascular


Rhythm: regular


Heart sounds: normal: S1, S2





- Gastrointestinal


General gastrointestinal: distended, soft





- Integumentary


Integumentary: decreased turgor





- Neurologic


Neurologic: CNII-XII intact





- Musculoskeletal


Musculoskeletal: gait normal, generalized weakness, strength equal bilaterally





- Psychiatric


Psychiatric: A&O x's 3, appropriate affect, intact judgment & insight





Results





- Laboratory Findings


CBC and BMP: 


                                 21 05:06





                                 21 05:06


PT/INR, D-dimer











PT  10.2 sec (9.0-12.0)   21  15:12    


 


INR  1.0  (<1.2)   21  15:12    








Abnormal lab findings: 


                                  Abnormal Labs











  21





  15:12 15:12 15:12


 


BUN  22 H  


 


Creatinine  1.71 H  


 


Est GFR (CKD-EPI)AfAm   


 


Est GFR (CKD-EPI)NonAf   


 


Glucose  156 H  


 


POC Glucose (mg/dL)   


 


Hemoglobin A1c   6.2 H 


 


Uric Acid    7.8 H


 


Total Protein  5.8 L  


 


Albumin   














  21





  00:04 06:53 11:30


 


BUN   


 


Creatinine   


 


Est GFR (CKD-EPI)AfAm   


 


Est GFR (CKD-EPI)NonAf   


 


Glucose   


 


POC Glucose (mg/dL)  145 H  133 H  69 L


 


Hemoglobin A1c   


 


Uric Acid   


 


Total Protein   


 


Albumin   














  21





  17:36 20:06 05:06


 


BUN   


 


Creatinine   


 


Est GFR (CKD-EPI)AfAm    41.3 L


 


Est GFR (CKD-EPI)NonAf    35.6 L


 


Glucose   


 


POC Glucose (mg/dL)  63 L  104 H 


 


Hemoglobin A1c   


 


Uric Acid   


 


Total Protein    5.5 L


 


Albumin    3.60 L














  21





  17:10


 


BUN 


 


Creatinine 


 


Est GFR (CKD-EPI)AfAm 


 


Est GFR (CKD-EPI)NonAf 


 


Glucose 


 


POC Glucose (mg/dL)  117 H


 


Hemoglobin A1c 


 


Uric Acid 


 


Total Protein 


 


Albumin 














- Diagnostic Findings


Chest x-ray: report reviewed, image reviewed (Finding as noted above)





Assessment and Plan


Assessment: 





Bilateral basal atelectasis evaluated for pneumonia will get a computed 

tomography scan of the chest





Chronic kidney disease





Acute gout





Generalized weakness





Lightheadedness dizziness likely related to intravascular volume depletion 

dehydration


Plan: 





Overall plan as above monitor observe off of antibiotics, gently rehydrate the 

patient continue to treat with agents for acute gout, computed tomography scan 

of the chest has been ordered without IV dye


Time with Patient: Greater than 30

## 2021-05-21 NOTE — CONS
CONSULTATION



DATE OF SERVICE:

05/20/2021



REASON FOR CONSULTATION:

Toe cellulitis.



HISTORY OF PRESENT ILLNESS:

The patient is a 80-year-old  female presenting to [facility] ER

yesterday afternoon for evaluation of generalized weakness, no energy. The patient felt

like she may even passed out today.  The patient denies any history of any fall or

injury to any part of the body. The patient denies any headache, no chest pain, no

shortness or cough.  No abdominal pain or diarrhea.  The patient has been complaining

of some pain to his big toe area, mostly on the right side that apparently has been

going on for the last week or so. The patient has been treated with oral antibiotics in

the outpatient setting without any improvement, though patient is not sure the name of

the antibiotic. With these symptoms, the patient was evaluated by the ER physician.  On

arrival to the ER, the patient was afebrile and no fever has been recorded.

Subsequently the patient did have a normal white count with no left shift.  Creatinine

was mildly elevated at 1.71.  Uric acid was 7.8.  Liver enzymes are normal.  Urine was

negative. Corona PCR was negative. The patient did have x-rays of the toe, which did

not show any evidence of acute fracture or dislocation on the toes of the left foot,

did shows _______ changes though.  Chest x-ray negative for any pneumonia.  The patient

was started on Unasyn with concern for possible cellulitis of the left foot. Infectious

Disease was consulted for further management of antibiotic therapy.



REVIEW OF SYSTEMS:

Positive points have been mentioned in HPI.  Rest of systems are negative.



PAST MEDICAL HISTORY:

Asthma, heart failure, COPD, CVA, TIA, diabetes mellitus, hyperlipidemia, hypertension,

osteoarthritis, pneumonia.



PAST SURGICAL HISTORY:

Hysterectomy, tonsillectomy, cystocele repair, colonoscopy, bilateral cataract surgery.



SOCIAL HISTORY:

Remote history of smoking. No drinking or drug use.



FAMILY HISTORY:

Mother with history of diabetes mellitus.



ALLERGIES:

No known drug allergies.



MEDICATIONS:

The patient is currently on Unasyn 3 g q.8h.  The patient is on Prandin, Trulicity,

Neurontin, Coreg, Lipitor, Norco.



PHYSICAL EXAMINATION:

VITAL SIGNS: Blood pressure is 124/60 with a pulse of 82, temperature 98, she is 96% on

room air.

GENERAL DESCRIPTION: Patient is an elderly female lying in bed in no distress.  No

tachypnea or accessory muscles of respiration use.

HEENT:  Examination shows no pallor or scleral icterus.  Oral mucous membrane is dry.

NECK: Trachea central, no thyromegaly.

LUNGS: Unlabored breathing, clear to auscultation anteriorly. No wheeze or crackle.

HEART: S1-S2, regular rate and rhythm.

ABDOMEN:  Soft, no tenderness. No guarding or rigidity.

EXTREMITIES: No edema of the feet. Examination of the left foot did have some swelling

at the first metatarsophalangeal joint, slightly tender to touch with minimal redness.

No significant warmth ______ wound.

NEUROLOGICAL: Patient is awake, alert, oriented times three.  Mood and affect normal.



LABS:

Hemoglobin is 12.1, white count 6.2.  Uric acid level is up at 7.8.  Creatinine was

1.71.  X-ray report as mentioned above.



DIAGNOSTIC IMPRESSION:

Patient admitted to the hospital with weakness, lethargy and almost passed out in this

patient complaining of pain to the left foot, more likely related to acute gouty

arthritis, clinically doubt cellulitis in the patient with no fever or elevated white

count



PLAN:

1. Discontinue Unasyn.

2. Ideally we could have started the patient on colchicine, however, the patient is on

    Coreg that is contraindicating the use of colchicine.

3. We will start the patient on Indocin and watch her kidney function closely.

4. We will follow on her clinical condition and further adjust medication if needed.

    Thank you for this consultation.  Will follow this patient along with you.





MMODL / IJN: [id number] / Job#: [id number]

## 2021-05-21 NOTE — PN
PROGRESS NOTE



DATE OF SERVICE:

05/21/2021



REASON FOR FOLLOWUP:

Left foot pain and concern for gouty arthritis.



INTERVAL HISTORY:

Patient is currently afebrile.  Still complaining of pain to the left _____ area.

Patient did not have any swelling or redness.  Denies any chest pain or shortness of

breath.  Occasional cough.  No abdominal pain.  No diarrhea.



PHYSICAL EXAMINATION:

Blood pressure 151/55 with a pulse of 68, temperature 97.8.  She is 95% on room air.

General description: The patient is an elderly female lying in bed in no distress.

Respiratory system: Unlabored breathing, clear to auscultation anteriorly.

Heart S1, S2.  Regular rate and rhythm.

Abdomen soft, no tenderness.

Left foot big toe area, _____joint area with slight swelling and no redness.  Tender to

touch though.



LABS:

Hemoglobin is 12.4, white count 6.3, BUN of 20, creatinine 1.4.



DIAGNOSTIC IMPRESSION AND PLAN:

Patient with left foot pain likely from gouty arthritis.  Patient is covered with

indomethacin and cannot use the Colchicine because of the Coreg the patient is on.

Clinically doubt cellulitis and no need for antibiotic therapy.





MMODL / IJN: [id number] / Job#: [id number]

## 2021-05-22 LAB
GLUCOSE BLD-MCNC: 108 MG/DL (ref 75–99)
GLUCOSE BLD-MCNC: 113 MG/DL (ref 75–99)
GLUCOSE BLD-MCNC: 131 MG/DL (ref 75–99)
GLUCOSE BLD-MCNC: 206 MG/DL (ref 75–99)
GLUCOSE BLD-MCNC: 74 MG/DL (ref 75–99)

## 2021-05-22 RX ADMIN — INDOMETHACIN SCH MG: 25 CAPSULE ORAL at 08:10

## 2021-05-22 RX ADMIN — GABAPENTIN SCH MG: 300 CAPSULE ORAL at 08:02

## 2021-05-22 RX ADMIN — INDOMETHACIN SCH MG: 25 CAPSULE ORAL at 21:04

## 2021-05-22 RX ADMIN — GABAPENTIN SCH MG: 300 CAPSULE ORAL at 21:04

## 2021-05-22 RX ADMIN — INDOMETHACIN SCH MG: 25 CAPSULE ORAL at 15:31

## 2021-05-22 RX ADMIN — REPAGLINIDE SCH MG: 1 TABLET ORAL at 08:11

## 2021-05-22 RX ADMIN — REPAGLINIDE SCH MG: 1 TABLET ORAL at 21:05

## 2021-05-22 RX ADMIN — ATORVASTATIN CALCIUM SCH MG: 40 TABLET, FILM COATED ORAL at 21:04

## 2021-05-22 RX ADMIN — REPAGLINIDE SCH MG: 1 TABLET ORAL at 15:30

## 2021-05-22 NOTE — PN
PROGRESS NOTE



DATE OF SERVICE:

05/21/2021



80-year-old white female with acute gout in her toes and lumbar disc disease.

Generalized weakness, unable ambulate.



She is started on pain medications: Norco 5/325 every 6 hours, Indocin for the gout,

Neurontin for the neuropathy for her lumbar disc disease. She has poor ambulation.  She

needs to go the nursing home.  She wants to go to [facility].



CARDIOVASCULAR: S1, S2.

Lungs clear.

GI soft.

Musculoskeletal: She is sitting up in the bed.  Her strength is 3/5 lower extremities.

She has a great toe swollen and deviated on the left foot.



We will get PT OT involved.  Get her into [facility].  Gout appears to be

improving.



ASSESSMENT:

1. Gout.

2. Diabetes mellitus.

3. Lumbar radiculopathy/.

4. Gait and generalized weakness.

PT/OT. Get her into [facility].





MMSHAUN / IJN: [id number] / Job#: [id number]

## 2021-05-22 NOTE — P.PN
Subjective


Progress Note Date: 05/22/21


Principal diagnosis: 


Small bilateral pleural effusion





Bilateral basal atelectasis evaluated for pneumonia will get a computed 

tomography scan of the chest





Chronic kidney disease





Acute gout





Generalized weakness





Lightheadedness dizziness likely related to intravascular volume depletion 

dehydration








05/22/2021, patient seen eval examined during the rounds labs reviewed medica

tions reviewed care plan discussed, respiratory status remains stable denies any

chest pain cough or shortness of breath, patient underwent computed tomography 

scan of the chest noncontrast due to atelectasis of the left lower lobe, 

computed tomography scan of the chest performed on 05/22/2021 shows bilateral 

chronic emphysematous changes along with cardiomegaly small bilateral pleural 

effusion and basal atelectasis no acute infiltrate identified,








This is a pleasant 76-year-old female seen evaluated examined on fifth floor, 

patient came into the hospital with generalized weakness unable to ambulate as 

well as left second toe infection, patient has been poorly responsive with oral 

antibiotics, because of dizziness lightheadedness and feeling weak came into the

hospital, her prior history significant for dyslipidemia hypertension 

hypertensive cardiovascular disease and peripheral neuropathy patient has been 

on oral Bactrim, she used to smoke in the past quit several years ago, left foot

x-ray negative for dislocation and fracture, chest x-ray significant for 

developing bilateral infiltrate likely pneumonia and small effusion cannot be 

excluded, she was noted to have a normal CBC and chemistry significant for 

slightly elevated creatinine of 1.4, she has been on IV Unasyn 1 dose has been 

given now off of Unasyn, patient is suspected to have a gout was started on 

colchicine and Indocin of the 4 to will check computed tomography scan of the 

chest noncontrast to evaluate for pneumonia versus atelectasis





Objective





- Vital Signs


Vital signs: 


                                   Vital Signs











Temp  97.5 F L  05/22/21 12:43


 


Pulse  54 L  05/22/21 12:43


 


Resp  17   05/22/21 12:43


 


BP  122/66   05/22/21 12:43


 


Pulse Ox  95   05/22/21 12:43








                                 Intake & Output











 05/21/21 05/22/21 05/22/21





 18:59 06:59 18:59


 


Intake Total 480 200 


 


Output Total   0


 


Balance 480 200 0


 


Weight  69 kg 


 


Intake:   


 


  Oral 480 200 


 


Output:   


 


  Stool   0


 


Other:   


 


  Voiding Method  Toilet Toilet





  Bedpan Bedpan





  Diaper Diaper


 


  # Voids 4 1 














- Exam





- Constitutional


General appearance: cooperative, disheveled





- EENT


Eyes: PERRLA


ENT: hard of hearing


Ears: bilateral: normal





- Neck


Carotids: bilateral: upstroke normal


Thyroid: bilateral: normal size





- Respiratory


Respiratory: bilateral: CTA





- Cardiovascular


Rhythm: regular


Heart sounds: normal: S1, S2





- Gastrointestinal


General gastrointestinal: distended, soft





- Integumentary


Integumentary: decreased turgor





- Neurologic


Neurologic: CNII-XII intact





- Musculoskeletal


Musculoskeletal: gait normal, generalized weakness, strength equal bilaterally





- Psychiatric


Psychiatric: A&O x's 3, appropriate affect, intact judgment & insight





- Labs


CBC & Chem 7: 


                                 05/21/21 05:06





                                 05/21/21 05:06


Labs: 


                  Abnormal Lab Results - Last 24 Hours (Table)











  05/21/21 05/21/21 05/22/21 Range/Units





  17:10 21:26 02:39 


 


POC Glucose (mg/dL)  117 H  125 H  113 H  (75-99)  mg/dL














  05/22/21 05/22/21 Range/Units





  06:45 12:06 


 


POC Glucose (mg/dL)  74 L  206 H  (75-99)  mg/dL








                      Microbiology - Last 24 Hours (Table)











 05/19/21 15:12 Blood Culture - Preliminary





 Blood    No Growth after 48 hours














Assessment and Plan


Assessment: 


Small bilateral pleural effusion





Bilateral basal atelectasis





No evidence of active infiltrate





Chronic kidney disease





Acute gout





Generalized weakness





Lightheadedness dizziness likely related to intravascular volume depletion 

dehydration


Plan: 





Continue deep breathing exercises incentive spirometry





No plans for thoracentesis





Observe off of antibiotics as no active pneumonia seen





Further plan of care as per clinical response of the patient


Time with Patient: Greater than 30

## 2021-05-22 NOTE — CT
EXAMINATION TYPE: CT chest wo con

 

DATE OF EXAM: 5/22/2021

 

COMPARISON: Chest CT December 11, 2020

 

HISTORY: Atelectasis

 

CT DLP: 347 mGycm.  Automated Exposure Control for Dose Reduction was Utilized.

 

 

TECHNIQUE:  CT scan of the thorax is performed without IV contrast.

 

FINDINGS:

 

LUNGS: Mild to moderate underlying emphysematous change redemonstrated. Tiny bilateral pleural effusi
ons redemonstrated. Mild to moderate bibasilar linear scarring and/or atelectasis redemonstrated. No 
suspicious nodules or masses. No pneumothorax seen bilaterally.

 

MEDIASTINUM: Lack of IV contrast is noted to limit evaluation for mediastinal and especially hilar ad
enopathy. There are no definitive greater than 1 cm hilar or mediastinal lymph nodes. Persistent card
iomegaly. Persistent moderate to severe three-vessel coronary artery calcification. Stable tiny peric
ardial effusion. Moderate to severe calcified plaque of the aorta extends into branch vessels.

 

OTHER: Cholecystectomy clips. Large bridging osteophytes in thoracic spine redemonstrated.

 

IMPRESSION: Mild to moderate chronic emphysematous change and cardiomegaly with tiny bilateral pleura
l effusions and mild to moderate bibasilar linear scarring and/or atelectasis. No new focal infiltrat
e noted.

## 2021-05-23 VITALS — RESPIRATION RATE: 16 BRPM

## 2021-05-23 LAB
ANION GAP SERPL CALC-SCNC: 6.4 MMOL/L (ref 4–12)
BASOPHILS # BLD AUTO: 0.04 X 10*3/UL (ref 0–0.1)
BASOPHILS NFR BLD AUTO: 0.7 %
BUN SERPL-SCNC: 34 MG/DL (ref 9–27)
BUN/CREAT SERPL: 22.67 RATIO (ref 12–20)
CALCIUM SPEC-MCNC: 9.2 MG/DL (ref 8.7–10.3)
CHLORIDE SERPL-SCNC: 110 MMOL/L (ref 96–109)
CO2 SERPL-SCNC: 26.6 MMOL/L (ref 21.6–31.8)
EOSINOPHIL # BLD AUTO: 0.46 X 10*3/UL (ref 0.04–0.35)
EOSINOPHIL NFR BLD AUTO: 7.7 %
ERYTHROCYTE [DISTWIDTH] IN BLOOD BY AUTOMATED COUNT: 3.98 X 10*6/UL (ref 4.1–5.2)
ERYTHROCYTE [DISTWIDTH] IN BLOOD: 11.9 % (ref 11.5–14.5)
GLUCOSE BLD-MCNC: 104 MG/DL (ref 75–99)
GLUCOSE BLD-MCNC: 132 MG/DL (ref 75–99)
GLUCOSE BLD-MCNC: 72 MG/DL (ref 75–99)
GLUCOSE BLD-MCNC: 92 MG/DL (ref 75–99)
GLUCOSE BLD-MCNC: 92 MG/DL (ref 75–99)
GLUCOSE SERPL-MCNC: 75 MG/DL (ref 70–110)
HCT VFR BLD AUTO: 35.5 % (ref 37.2–46.3)
HGB BLD-MCNC: 11.7 G/DL (ref 12–15)
LYMPHOCYTES # SPEC AUTO: 1.82 X 10*3/UL (ref 0.9–5)
LYMPHOCYTES NFR SPEC AUTO: 30.5 %
MCH RBC QN AUTO: 29.4 PG (ref 27–32)
MCHC RBC AUTO-ENTMCNC: 33 G/DL (ref 32–37)
MCV RBC AUTO: 89.2 FL (ref 80–97)
MONOCYTES # BLD AUTO: 0.59 X 10*3/UL (ref 0.2–1)
MONOCYTES NFR BLD AUTO: 9.9 %
NEUTROPHILS # BLD AUTO: 3.05 X 10*3/UL (ref 1.8–7.7)
NEUTROPHILS NFR BLD AUTO: 51 %
PLATELET # BLD AUTO: 239 X 10*3/UL (ref 140–440)
POTASSIUM SERPL-SCNC: 4.6 MMOL/L (ref 3.5–5.5)
SODIUM SERPL-SCNC: 143 MMOL/L (ref 135–145)
WBC # BLD AUTO: 5.97 X 10*3/UL (ref 4.5–10)

## 2021-05-23 RX ADMIN — GABAPENTIN SCH MG: 300 CAPSULE ORAL at 21:42

## 2021-05-23 RX ADMIN — GABAPENTIN SCH MG: 300 CAPSULE ORAL at 08:30

## 2021-05-23 RX ADMIN — HYDROCODONE BITARTRATE AND ACETAMINOPHEN PRN EACH: 5; 325 TABLET ORAL at 08:34

## 2021-05-23 RX ADMIN — INDOMETHACIN SCH MG: 25 CAPSULE ORAL at 17:29

## 2021-05-23 RX ADMIN — REPAGLINIDE SCH MG: 1 TABLET ORAL at 08:31

## 2021-05-23 RX ADMIN — INDOMETHACIN SCH MG: 25 CAPSULE ORAL at 21:43

## 2021-05-23 RX ADMIN — REPAGLINIDE SCH MG: 1 TABLET ORAL at 17:28

## 2021-05-23 RX ADMIN — REPAGLINIDE SCH MG: 1 TABLET ORAL at 21:42

## 2021-05-23 RX ADMIN — INDOMETHACIN SCH MG: 25 CAPSULE ORAL at 08:30

## 2021-05-23 RX ADMIN — ATORVASTATIN CALCIUM SCH MG: 40 TABLET, FILM COATED ORAL at 21:42

## 2021-05-23 NOTE — PN
PROGRESS NOTE



DATE OF SERVICE:

05/22/2021



REASON FOR FOLLOWUP:

Left foot pain with concern for gouty arthritis.



INTERVAL HISTORY:

The patient is afebrile.  The patient is complaining of pain to the left foot ______

the base of the big toe.  The patient is currently not having any swelling or any

redness.  Patient denies having any chest pain, shortness of breath.  Occasional cough.

No abdominal pain. No diarrhea.



PHYSICAL EXAMINATION:

VITAL SIGNS:  Blood pressure 94/52 with a pulse of 93, temperature 98.4.  She is 95% on

room air.

GENERAL DESCRIPTION: An elderly female lying in bed in no distress.

RESPIRATORY SYSTEM: Unlabored breathing, decreased breath sounds at the bases, no

wheezes.

HEART: S1, S2.  Regular rate and rhythm.

ABDOMEN: Soft, no tenderness.

EXTREMITIES: Left foot currently with no swelling or redness, though tender to touch.



LABS:

The patient did have CT of the chest that was negative for any acute infiltrate.



DIAGNOSTIC IMPRESSION AND PLAN:

Patient with left foot pain.  Concern for possible gouty arthritis ______ elevated uric

acid level.  She is on Indocin as we cannot use the colchicine. Will repeat

inflammatory markers and kidney function tomorrow and continue supportive care.





MMODL / IJN: [id number] / Job#: [id number]

## 2021-05-23 NOTE — P.PN
Subjective


Progress Note Date: 05/23/21


Principal diagnosis: 


Small bilateral pleural effusion





Bilateral basal atelectasis evaluated for pneumonia will get a computed 

tomography scan of the chest





Chronic kidney disease





Acute gout





Generalized weakness





Lightheadedness dizziness likely related to intravascular volume depletion 

dehydration





05/23/2021, patient seen eval examined during the rounds that was reviewed med

ications reviewed, left second and big toe is still inflamed severity has 

improved, respiratory status stable, patient is undergoing therapy for gout, 

continue deep breathing exercises and incentive spirometry





05/22/2021, patient seen eval examined during the rounds labs reviewed 

medications reviewed care plan discussed, respiratory status remains stable 

denies any chest pain cough or shortness of breath, patient underwent computed 

tomography scan of the chest noncontrast due to atelectasis of the left lower 

lobe, computed tomography scan of the chest performed on 05/22/2021 shows 

bilateral chronic emphysematous changes along with cardiomegaly small bilateral 

pleural effusion and basal atelectasis no acute infiltrate identified,








This is a pleasant 76-year-old female seen evaluated examined on fifth floor, 

patient came into the hospital with generalized weakness unable to ambulate as w

ell as left second toe infection, patient has been poorly responsive with oral 

antibiotics, because of dizziness lightheadedness and feeling weak came into the

hospital, her prior history significant for dyslipidemia hypertension 

hypertensive cardiovascular disease and peripheral neuropathy patient has been 

on oral Bactrim, she used to smoke in the past quit several years ago, left foot

x-ray negative for dislocation and fracture, chest x-ray significant for 

developing bilateral infiltrate likely pneumonia and small effusion cannot be 

excluded, she was noted to have a normal CBC and chemistry significant for 

slightly elevated creatinine of 1.4, she has been on IV Unasyn 1 dose has been 

given now off of Unasyn, patient is suspected to have a gout was started on 

colchicine and Indocin of the 4 to will check computed tomography scan of the 

chest noncontrast to evaluate for pneumonia versus atelectasis





Objective





- Vital Signs


Vital signs: 


                                   Vital Signs











Temp  97.9 F   05/23/21 06:29


 


Pulse  56 L  05/23/21 08:00


 


Resp  15   05/23/21 08:00


 


BP  138/64   05/23/21 06:29


 


Pulse Ox  95   05/23/21 06:29








                                 Intake & Output











 05/22/21 05/23/21 05/23/21





 18:59 06:59 18:59


 


Intake Total 240  


 


Output Total 0  0


 


Balance 240  0


 


Weight  67 kg 


 


Intake:   


 


  Oral 240  


 


Output:   


 


  Stool 0  0


 


Other:   


 


  Voiding Method Toilet Toilet Toilet





 Bedpan Bedside Commode Bedside Commode





 Diaper  


 


  # Voids 2 2 














- Exam





- Constitutional


General appearance: cooperative, disheveled





- EENT


Eyes: PERRLA


ENT: hard of hearing


Ears: bilateral: normal





- Neck


Carotids: bilateral: upstroke normal


Thyroid: bilateral: normal size





- Respiratory


Respiratory: bilateral: CTA





- Cardiovascular


Rhythm: regular


Heart sounds: normal: S1, S2





- Gastrointestinal


General gastrointestinal: distended, soft





- Integumentary


Integumentary: decreased turgor





- Neurologic


Neurologic: CNII-XII intact





- Musculoskeletal


Musculoskeletal: gait normal, generalized weakness, strength equal bilaterally





- Psychiatric


Psychiatric: A&O x's 3, appropriate affect, intact judgment & insight





- Labs


CBC & Chem 7: 


                                 05/23/21 06:06





                                 05/23/21 06:06


Labs: 


                  Abnormal Lab Results - Last 24 Hours (Table)











  05/22/21 05/22/21 05/22/21 Range/Units





  12:06 17:02 20:55 


 


RBC     (4.10-5.20)  X 10*6/uL


 


Hgb     (12.0-15.0)  g/dL


 


Hct     (37.2-46.3)  %


 


Eosinophils #     (0.04-0.35)  X 10*3/uL


 


Chloride     ()  mmol/L


 


BUN     (9.0-27.0)  mg/dL


 


Est GFR (CKD-EPI)AfAm     (60.0-200.0)   


 


Est GFR (CKD-EPI)NonAf     (60.0-200.0)   


 


BUN/Creatinine Ratio     (12.00-20.00)  Ratio


 


POC Glucose (mg/dL)  206 H  108 H  131 H  (75-99)  mg/dL


 


C-Reactive Protein     (0.0-0.8)  mg/dL


 


Procalcitonin     (0.02-0.09)  ng/mL














  05/23/21 05/23/21 05/23/21 Range/Units





  00:04 06:06 06:06 


 


RBC    3.98 L  (4.10-5.20)  X 10*6/uL


 


Hgb    11.7 L  (12.0-15.0)  g/dL


 


Hct    35.5 L  (37.2-46.3)  %


 


Eosinophils #    0.46 H  (0.04-0.35)  X 10*3/uL


 


Chloride     ()  mmol/L


 


BUN     (9.0-27.0)  mg/dL


 


Est GFR (CKD-EPI)AfAm     (60.0-200.0)   


 


Est GFR (CKD-EPI)NonAf     (60.0-200.0)   


 


BUN/Creatinine Ratio     (12.00-20.00)  Ratio


 


POC Glucose (mg/dL)  104 H    (75-99)  mg/dL


 


C-Reactive Protein     (0.0-0.8)  mg/dL


 


Procalcitonin   0.11 H   (0.02-0.09)  ng/mL














  05/23/21 05/23/21 Range/Units





  06:06 06:59 


 


RBC    (4.10-5.20)  X 10*6/uL


 


Hgb    (12.0-15.0)  g/dL


 


Hct    (37.2-46.3)  %


 


Eosinophils #    (0.04-0.35)  X 10*3/uL


 


Chloride  110 H   ()  mmol/L


 


BUN  34.0 H   (9.0-27.0)  mg/dL


 


Est GFR (CKD-EPI)AfAm  38.0 L   (60.0-200.0)   


 


Est GFR (CKD-EPI)NonAf  32.8 L   (60.0-200.0)   


 


BUN/Creatinine Ratio  22.67 H   (12.00-20.00)  Ratio


 


POC Glucose (mg/dL)   72 L  (75-99)  mg/dL


 


C-Reactive Protein  5.4 H   (0.0-0.8)  mg/dL


 


Procalcitonin    (0.02-0.09)  ng/mL








                      Microbiology - Last 24 Hours (Table)











 05/19/21 15:12 Blood Culture - Preliminary





 Blood    No Growth after 72 hours














Assessment and Plan


Assessment: 


Small bilateral pleural effusion





Bilateral basal atelectasis





No evidence of active infiltrate





Chronic kidney disease





Acute gout





Generalized weakness





Lightheadedness dizziness likely related to intravascular volume depletion 

dehydration


Plan: 





Continue deep breathing exercises incentive spirometry





No plans for thoracentesis





Observe off of antibiotics as no active pneumonia seen





Further plan of care as per clinical response of the patient


Time with Patient: Greater than 30

## 2021-05-23 NOTE — P.PN
Progress Note - Text


Progress Note Date: 05/23/21





presenting complaint:


Tired





History of presenting complaint:


Admitted with feeling tired.pain in the left foot.  Possible gouty arthritis.


Today: Sitting up in a chair.  Feeling better.  Pain control.  Oral intake fair.





Review of systems: Was done for constitutional, cardiovascular, GI, pulmonary. 

relevant finding as above





Active Medications





Hydrocodone Bitart/Acetaminophen (Hydrocodone/Apap 5-325mg 1 Each Tab)  1 each 

PO Q6HR PRN


   PRN Reason: Pain


   Last Admin: 05/23/21 08:34 Dose:  1 each


   Documented by: 


Atorvastatin Calcium (Atorvastatin 40 Mg Tab)  40 mg PO HS [facility]


   Last Admin: 05/22/21 21:04 Dose:  40 mg


   Documented by: 


Carvedilol (Carvedilol 12.5 Mg Tab)  12.5 mg PO BID [facility]


   Last Admin: 05/23/21 08:30 Dose:  12.5 mg


   Documented by: 


Gabapentin (Gabapentin 300 Mg Cap)  300 mg PO BID [facility]


   Last Admin: 05/23/21 08:30 Dose:  300 mg


   Documented by: 


Indomethacin (Indomethacin 25 Mg Cap)  25 mg PO TID [facility]


   Last Admin: 05/23/21 17:29 Dose:  25 mg


   Documented by: 


Non-Formulary Medication (Dulaglutide [Trulicity])  0.75 mg SQ Fr@0900 [facility]


   Last Admin: 05/21/21 07:44 Dose:  Not Given


   Documented by: 


Repaglinide (Repaglinide 1 Mg Tab)  1 mg PO TID [facility]


   Last Admin: 05/23/21 17:28 Dose:  1 mg


   Documented by: 





On examination:


VITAL SIGNS: [87.9, 56, 15, 1 38 x 64, 95% room air]


GENERAL APPEARANCE: sitting up in a chair, awake comfortable. 


HEENT: Normal external appearance of nose and ear.  Oral cavity normal


EYES: Pupils equal. Conjunctiva normal. 


NECK: JVD not raised. Mass not palpable. 


RESPIRATORY: Respiratory effort normal. Lungs decreased breath sounds 


CARDIOVASCULAR: First and second sounds normal. No edema. 


ABDOMEN: Soft. Liver and spleen not palpable. No tenderness. No mass palpable. 


PSYCHIATRY: Alert and oriented x3. Mood and affect normal. 








INVESTIGATIONS, reviewed in the clinical context:


WBC 5.9 hemoglobin 11.7 platelets 239creatinine 1.5


Previous testing:


Creatinine 1.7





Assessment plan:





-Acute kidney injury, likely prerenal


IV fluids





- COPD in a previous smoker





-Diabetes mellitus type 2


Continue with Prandin and truliicity.  Follow Accu-Cheks





-Diabetic peripheral neuropathy


Continue with Neurontin





-lumbar spine multilevel osteoarthritis, with moderate central stenosis at L4-

L5.


Pain medications as needed





-Cognitive impairment





-Essential hypertension





-hyperlipidemia


Continue with Lipitor





-Chronic kidney disease combination of hypertensive nephrosclerosis and diabetic

nephropathy


Follow renal function





-Possible acute flareup of left foot gouty arthritis-improving


On Indocin





Care was discussed with the patient.  Continue current medication and treatment 

plan.

## 2021-05-23 NOTE — PN
PROGRESS NOTE



A 80-year-old white female, generalized weakness, lumbar radiculopathy, gout.  Denies

chest pain, shortness of breath.



Vital signs are stable, afebrile. Cardiovascular S1-S2. Lungs clear.  GI soft.

Hematology negative Homans.



ASSESSMENT:

1. Lumbar neuritis.

2. Lumbar disk disease.

3. Gout.

4. Generalized weakness.



PROGNOSIS:

Guarded.



Continue PT, OT.





MMODL / IJN: [id number] / Job#: [id number]

## 2021-05-23 NOTE — PN
PROGRESS NOTE



DATE OF SERVICE:

05/23/2021



REASON FOR FOLLOWUP:

Left foot pain and question of possible gouty arthritis.



INTERVAL HISTORY:

Patient is currently afebrile.  The patient is feeling better.  Breathing comfortably.

Denies having any chest pain, shortness of breath or cough.  The left foot pain is

currently controlled.



PHYSICAL EXAMINATION:

Blood pressure is 195/60 with a pulse of 80, temperature of 97.3.  She is 96% on room

air.

General description: The patient is an elderly female up in the chair in no distress.

Respiratory system: Unlabored breathing, clear to auscultation anteriorly.

Heart S1, S2.  Regular rate and rhythm.

Abdomen soft, no tenderness.

Left foot swelling and redness have improved.



DIAGNOSTIC IMPRESSION AND PLAN:

Patient with left foot pain, concern for possible gouty arthritis, does not behave like

cellulitis or septic arthritis.  Overall improvement on Indocin.  No need for systemic

antibiotic.  Continue supportive care.





MMODL / IJN: [id number] / Job#: [id number]

## 2021-05-24 VITALS — HEART RATE: 63 BPM | SYSTOLIC BLOOD PRESSURE: 141 MMHG | DIASTOLIC BLOOD PRESSURE: 57 MMHG | TEMPERATURE: 97.5 F

## 2021-05-24 LAB
GLUCOSE BLD-MCNC: 168 MG/DL (ref 75–99)
GLUCOSE BLD-MCNC: 89 MG/DL (ref 75–99)
VIT B12 SERPL-MCNC: 236 PG/ML (ref 200–944)

## 2021-05-24 RX ADMIN — HYDROCODONE BITARTRATE AND ACETAMINOPHEN PRN EACH: 5; 325 TABLET ORAL at 05:14

## 2021-05-24 RX ADMIN — REPAGLINIDE SCH MG: 1 TABLET ORAL at 08:28

## 2021-05-24 RX ADMIN — INDOMETHACIN SCH MG: 25 CAPSULE ORAL at 08:26

## 2021-05-24 NOTE — P.PN
Subjective


Progress Note Date: 05/24/21


Principal diagnosis: 


Small bilateral pleural effusion





Bilateral basal atelectasis evaluated for pneumonia will get a computed 

tomography scan of the chest





Chronic kidney disease





Acute gout





Generalized weakness





Lightheadedness dizziness likely related to intravascular volume depletion 

dehydration





05/24/2021, patient seen eval examined during the rounds labs reviewed medicat

ions reviewed shortness of breath stable patient is denies any chest pain, 

patient is advised to continue deep breathing sense incentive spirometry,





05/23/2021, patient seen eval examined during the rounds that was reviewed 

medications reviewed, left second and big toe is still inflamed severity has 

improved, respiratory status stable, patient is undergoing therapy for gout, 

continue deep breathing exercises and incentive spirometry





05/22/2021, patient seen eval examined during the rounds labs reviewed 

medications reviewed care plan discussed, respiratory status remains stable 

denies any chest pain cough or shortness of breath, patient underwent computed 

tomography scan of the chest noncontrast due to atelectasis of the left lower 

lobe, computed tomography scan of the chest performed on 05/22/2021 shows 

bilateral chronic emphysematous changes along with cardiomegaly small bilateral 

pleural effusion and basal atelectasis no acute infiltrate identified,








This is a pleasant 76-year-old female seen evaluated examined on fifth floor, 

patient came into the hospital with generalized weakness unable to ambulate as 

well as left second toe infection, patient has been poorly responsive with oral 

antibiotics, because of dizziness lightheadedness and feeling weak came into the

hospital, her prior history significant for dyslipidemia hypertension 

hypertensive cardiovascular disease and peripheral neuropathy patient has been 

on oral Bactrim, she used to smoke in the past quit several years ago, left foot

x-ray negative for dislocation and fracture, chest x-ray significant for 

developing bilateral infiltrate likely pneumonia and small effusion cannot be 

excluded, she was noted to have a normal CBC and chemistry significant for 

slightly elevated creatinine of 1.4, she has been on IV Unasyn 1 dose has been 

given now off of Unasyn, patient is suspected to have a gout was started on c

olchicine and Indocin of the 4 to will check computed tomography scan of the 

chest noncontrast to evaluate for pneumonia versus atelectasis





Objective





- Vital Signs


Vital signs: 


                                   Vital Signs











Temp  97.6 F   05/24/21 05:05


 


Pulse  66   05/24/21 05:05


 


Resp  16   05/24/21 05:05


 


BP  168/75   05/24/21 05:05


 


Pulse Ox  94 L  05/24/21 05:05








                                 Intake & Output











 05/23/21 05/24/21 05/24/21





 18:59 06:59 18:59


 


Intake Total 680 540 


 


Output Total 0 0 


 


Balance 680 540 


 


Intake:   


 


  Oral 680 540 


 


Output:   


 


  Stool 0 0 


 


Other:   


 


  Voiding Method Toilet Toilet 





 Bedside Commode Bedside Commode 


 


  # Voids 3 0 














- Exam





- Constitutional


General appearance: cooperative, disheveled





- EENT


Eyes: PERRLA


ENT: hard of hearing


Ears: bilateral: normal





- Neck


Carotids: bilateral: upstroke normal


Thyroid: bilateral: normal size





- Respiratory


Respiratory: bilateral: CTA





- Cardiovascular


Rhythm: regular


Heart sounds: normal: S1, S2





- Gastrointestinal


General gastrointestinal: distended, soft





- Integumentary


Integumentary: decreased turgor





- Neurologic


Neurologic: CNII-XII intact





- Musculoskeletal


Musculoskeletal: gait normal, generalized weakness, strength equal bilaterally





- Psychiatric


Psychiatric: A&O x's 3, appropriate affect, intact judgment & insight





- Labs


CBC & Chem 7: 


                                 05/23/21 06:06





                                 05/23/21 06:06


Labs: 


                  Abnormal Lab Results - Last 24 Hours (Table)











  05/23/21 05/23/21 Range/Units





  06:06 20:10 


 


ESR  63 H   (0-30)  mm/Hr


 


POC Glucose (mg/dL)   132 H  (75-99)  mg/dL








                      Microbiology - Last 24 Hours (Table)











 05/19/21 15:12 Blood Culture - Preliminary





 Blood    No Growth after 96 hours














Assessment and Plan


Assessment: 


Small bilateral pleural effusion





Bilateral basal atelectasis





No evidence of active infiltrate





Chronic kidney disease





Acute gout





Generalized weakness





Lightheadedness dizziness likely related to intravascular volume depletion 

dehydration


Plan: 





Continue deep breathing exercises incentive spirometry





No plans for thoracentesis





Observe off of antibiotics as no active pneumonia seen





Further plan of care as per clinical response of the patient


Time with Patient: Greater than 30

## 2021-05-24 NOTE — P.PN
Subjective


Progress Note Date: 05/21/21





Agent for seen for a follow-up.  Patient is laying comfortably in the bed.  

Continues to have pain in the feet.  No new concerns.





Objective





- Vital Signs


Vital signs: 


                                   Vital Signs











Temp  97.8 F   05/21/21 12:08


 


Pulse  68   05/21/21 12:08


 


Resp  17   05/21/21 12:08


 


BP  151/55   05/21/21 12:08


 


Pulse Ox  95   05/21/21 12:08








                                 Intake & Output











 05/21/21 05/21/21 05/22/21





 06:59 18:59 06:59


 


Intake Total 120 480 


 


Output Total 0  


 


Balance 120 480 


 


Weight 69.5 kg  


 


Intake:   


 


  Oral 120 480 


 


Output:   


 


  Stool 0  


 


Other:   


 


  Voiding Method Toilet  Toilet





 Bedpan  Bedpan





 Diaper  Diaper


 


  # Voids 1 4 














- Exam





Patient was somnolent, did not examine in detail.  Patient not cooperating.





- Labs


CBC & Chem 7: 


                                 05/23/21 06:06





                                 05/23/21 06:06


Labs: 


                  Abnormal Lab Results - Last 24 Hours (Table)











  05/19/21 05/21/21 05/21/21 Range/Units





  15:12 05:06 17:10 


 


Est GFR (CKD-EPI)AfAm   41.3 L   (60.0-200.0)   


 


Est GFR (CKD-EPI)NonAf   35.6 L   (60.0-200.0)   


 


POC Glucose (mg/dL)    117 H  (75-99)  mg/dL


 


Hemoglobin A1c  6.2 H    (4.0-6.0)  %


 


Total Protein   5.5 L   (6.2-8.2)  g/dL


 


Albumin   3.60 L   (3.80-4.90)  g/dL














  05/21/21 Range/Units





  21:26 


 


Est GFR (CKD-EPI)AfAm   (60.0-200.0)   


 


Est GFR (CKD-EPI)NonAf   (60.0-200.0)   


 


POC Glucose (mg/dL)  125 H  (75-99)  mg/dL


 


Hemoglobin A1c   (4.0-6.0)  %


 


Total Protein   (6.2-8.2)  g/dL


 


Albumin   (3.80-4.90)  g/dL








                      Microbiology - Last 24 Hours (Table)











 05/19/21 15:12 Blood Culture - Preliminary





 Blood    No Growth after 24 hours














Assessment and Plan


Assessment: 





* Bilateral feet pain, with evidence of swelling of right first MTP joint, and 

  the PIP and DIP joint of the left second toe.  Probable gouty arthritis with 

  exacerbation.  Rule out underlying peripheral neuropathy.


* History of severe lumbar spinal stenosis at L4-5 level as per MRI from 06/26/ 2020.  Patient has not had any back surgery.  Patient does not have 

  significant back pain or radicular symptoms at this time.


* Syncopal spells, unclear cause.  Possibly orthostatic or vasovagal.


* Decreased peripheral pulses, rule out PAD.


* Probable Gout.  Patient has elevated uric acid 8.0/7.4 on 08/06/2020.


* Diabetes


* X tobacco use


Plan: 





* Uric acid 6.0.  Suggest treatment for possible acute exacerbation of gout.  

  Patient on indomethacin.  


* Arterial Doppler of lower extremities were attempted.  Patient unable to 

  tolerate blood pressure cuffs and inflation attempts on legs, this arterial 

  Doppler study was discontinued.  


* Hemoglobin A1c 6.2


* Increased Neurontin to 300 mg 3 times a day.


* B12, folate.


* Consider EMG and nerve conductions.


* Patient had a carotid Doppler on 12/12/2020, which revealed no evidence of 

  hemodynamically significant stenosis of the ICA.  Antegrade flow in both 

  vertebral arteries.  No need to repeat.


* 2-D echo from 08/14/2020 revealed normal left-ventricular size.  Moderate 

  concentric LVH, EF is 60-65%.  Mild aortic valve sclerosis.  Mild AR.  


* Dr. Cahranjit Brown to resume neurology service from Monday.

## 2021-05-24 NOTE — CDI
Documentation Clarification Form



Date: 05/24/2021 01:28:21 PM

From: Jonelle Chappell RN, CCDS

Phone: [phone]

MRN: [id number]

Admit Date: 05/20/2021 01:05:00 PM

Patient Name: Michelle Samuel

Visit Number: [id number]



ATTENTION: The Clinical Documentation Specialists (CDI) and [facility] Coding Staff 
appreciate your assistance in clarifying documentation. Please respond to the 
clarification below the line at the bottom and electronically sign. The CDI & 
[facility] Coding staff will review the response and follow-up if needed. Please note: 
Queries are made part of the Legal Health Record. If you have any questions, 
please contact the author of this message via ITS.



Dr. Poli Pate



Conflicting documentation has been found in the medical record.  As attending 
physician, please provide clarification.



5/24 D/C Summary: "Cellulitis of the toe."

5/19 H&P: "Cellulitis of the foot/toe diabetic wound infection."



5/24 ID Progress note: "Patient with left foot pain, concerning for possible 
gouty arthritis, pain has been mostly in the first metatarsal joint. No evidence
of cellulitis on Unasyn continue while watching the kidney function closely."

5/21 ID: "Clinically doubt cellulitis and no need for antibiotic therapy."



History/Risk Factors:

Gouty OA, COPD, DM2, DM peripheral neuropathy, HTN, NAFISA on CKD, CVA

Clinical Indicators:

5/23 Attending Progress note: Possible acute flare-up of gouty arthritis 
improving



Treatment:

5/19-5/20 Unasyn 3gm IVPB Q 6 hrs.

Please clarify which diagnosis is most appropriate:



[  ]  Cellulitis is ruled out

[  ]  Cellulitis is present and treated

[  ]  Other (please specify) __________

[  ]  Unable to determine 



(Template Last Revised: March 2021)

___________________________________________________________________________



MTDD

## 2021-05-24 NOTE — PN
PROGRESS NOTE



DATE OF SERVICE:

05/24/2021



REASON FOR FOLLOW UP:

Left foot pain concerning for a gouty arthritis.



INTERVAL HISTORY:

The patient is afebrile.  The patient is feeling better, breathing comfortably.

Overall pain and discomfort to the foot is improved.  Patient denies having any chest

pain, no shortness of breath or cough. No abdominal pain or diarrhea.



PHYSICAL EXAMINATION:

Blood pressure 141/57, pulse of 63, temperature 97.5.  She is 97% on room air.

General description is a middle-aged female up in the bed in no distress.

RESPIRATORY SYSTEM: Unlabored breathing, clear to auscultation anteriorly.

HEART: S1, S2.  Regular rate and rhythm.

ABDOMEN:  Soft, no tenderness.

Left foot overall swelling has improved.



LABS:

No new labs have been obtained today.



DIAGNOSTIC IMPRESSION AND PLAN:

Patient with left foot pain, concerning for possible gouty arthritis, pain has been

mostly in the first metatarsal joint.  No evidence of cellulitis on Unasyn continue

while watching the kidney function closely.  Continue supportive care.





MMODL / IJN: [id number] / Job#: [id number]

## 2021-05-24 NOTE — DS
DISCHARGE SUMMARY



CONDITION:

Stable.



DIET:

Regular.



AMBULATE:

As tolerated.



She needs physical therapy for generalized weakness.



DATE OF DISCHARGE:

05/24/2021.



MEDICINES:

1. Indocin 25 mg t.i.d.

2. Neurontin 300 t.i.d.

3. Britton 5/325 q.6 hours.

4. Zocor 80 at night.

5. Coreg 12.5 b.i.d.

6. Prandin 1 mg t.i.d.

7. Trulicity 0.75 mg once a week.



DIAGNOSES:

1. Cellulitis of the toe.

2. Acute gout to the right great toe.

3. Generalized weakness.

4. Lumbar disc disease.

5. Significant spinal stenosis of the lumbar spine.

6. Hypertension.

7. Coronary artery disease.

8. Diabetic neuropathy.

9. Diabetes mellitus.

The patient was admitted to the hospital due to acute gout infection and acute lumbar

disc disease.  Placed on Indocin 25 t.i.d. Her Neurontin for diabetic neuropathy was

continued at 300 t.i.d. Norco for pain was given.  She was seen by pain specialist,

arthritis doctors. Medications were adjusted.  She was stabilized and blood cultures

were negative.  She will be sent to nursing home in stable condition.  Dr. Poli Pate will see her up there.





MMODL / IJN: [id number] / Job#: [id number]

## 2021-05-26 NOTE — P.ARTDOP
Arterial Doppler





LOWER EXTREMITY ARTERIAL DOPPLER:





DATE OF SERVICE: 05/20/2021





Reason for study: Bilateral foot pain.





Doppler waveforms: Multiphasic at both femorals and atypical below.





Pulse volume recording: [].





Pressure gradients: Unable to record pressures in this limited study.





Ankle-brachial indices: [].





Toe brachial indices: [] on the right, [] on the left





Impression: This is a very limited study due to patient's inability to tolerate 

cuffs.  No pressures were done.  Waveforms suggest at least moderate bilateral 

fem-pop disease.  Cannot entirely rule out iliac component.  Clinical 

correlation recommended with vascular specialty consults considered if symptoms 

warrant.

## 2021-05-27 NOTE — CDI
Documentation Clarification Form

2nd Request





Date: 05/24/2021 01:28:21 PM

From: Jonelle Chappell RN, CCDS

Phone: [phone]

MRN: [id number]

Admit Date: 05/20/2021 01:05:00 PM

Patient Name: Michelle Samuel

Visit Number: [id number]



ATTENTION: The Clinical Documentation Specialists (CDI) and [facility] Coding Staff 
appreciate your assistance in clarifying documentation. Please respond to the 
clarification below the line at the bottom and electronically sign. The CDI & 
[facility] Coding staff will review the response and follow-up if needed. Please note: 
Queries are made part of the Legal Health Record. If you have any questions, 
please contact the author of this message via ITS.



Dr. Poli Pate



Conflicting documentation has been found in the medical record.  As attending 
physician, please provide clarification.



5/24 D/C Summary: "Cellulitis of the toe."

5/19 H&P: "Cellulitis of the foot/toe diabetic wound infection."



5/24 ID Progress note: "Patient with left foot pain, concerning for possible 
gouty arthritis, pain has been mostly in the first metatarsal joint. No evidence
of cellulitis on Unasyn continue while watching the kidney function closely."

5/21 ID: "Clinically doubt cellulitis and no need for antibiotic therapy."



History/Risk Factors:

Gouty OA, COPD, DM2, DM peripheral neuropathy, HTN, NAFISA on CKD, CVA

Clinical Indicators:

5/23 Attending Progress note: Possible acute flare-up of gouty arthritis 
improving



Treatment:

5/19-5/20 Unasyn 3gm IVPB Q 6 hrs.

Please clarify which diagnosis is most appropriate:



[  ]  Cellulitis is ruled out

[  ]  Cellulitis is present and treated

[  ]  Other (please specify) __________

[  ]  Unable to determine 



(Template Last Revised: March 2021)

___________________________________________________________________________





MTDD

## 2021-06-07 NOTE — PN
PROGRESS NOTE



Please add to the discharge: Cellulitis is ruled in of the foot.





MMODL / IJN: [id number] / Job#: [id number]

## 2021-08-02 ENCOUNTER — HOSPITAL ENCOUNTER (EMERGENCY)
Dept: HOSPITAL 47 - EC | Age: 81
Discharge: HOME | End: 2021-08-02
Payer: MEDICARE

## 2021-08-02 VITALS — DIASTOLIC BLOOD PRESSURE: 62 MMHG | HEART RATE: 65 BPM | TEMPERATURE: 97.1 F | SYSTOLIC BLOOD PRESSURE: 171 MMHG

## 2021-08-02 VITALS — RESPIRATION RATE: 18 BRPM

## 2021-08-02 DIAGNOSIS — E11.40: ICD-10-CM

## 2021-08-02 DIAGNOSIS — F41.0: Primary | ICD-10-CM

## 2021-08-02 DIAGNOSIS — I50.9: ICD-10-CM

## 2021-08-02 DIAGNOSIS — F41.9: ICD-10-CM

## 2021-08-02 DIAGNOSIS — J44.9: ICD-10-CM

## 2021-08-02 DIAGNOSIS — Z86.73: ICD-10-CM

## 2021-08-02 DIAGNOSIS — Z87.891: ICD-10-CM

## 2021-08-02 DIAGNOSIS — Z79.84: ICD-10-CM

## 2021-08-02 DIAGNOSIS — M19.90: ICD-10-CM

## 2021-08-02 DIAGNOSIS — I11.0: ICD-10-CM

## 2021-08-02 DIAGNOSIS — E78.5: ICD-10-CM

## 2021-08-02 LAB
ALBUMIN SERPL-MCNC: 4.1 G/DL (ref 3.5–5)
ALP SERPL-CCNC: 85 U/L (ref 38–126)
ALT SERPL-CCNC: 10 U/L (ref 4–34)
ANION GAP SERPL CALC-SCNC: 11 MMOL/L
APTT BLD: 21 SEC (ref 22–30)
AST SERPL-CCNC: 22 U/L (ref 14–36)
BASOPHILS # BLD AUTO: 0 K/UL (ref 0–0.2)
BASOPHILS NFR BLD AUTO: 1 %
BUN SERPL-SCNC: 27 MG/DL (ref 7–17)
CALCIUM SPEC-MCNC: 9.8 MG/DL (ref 8.4–10.2)
CHLORIDE SERPL-SCNC: 109 MMOL/L (ref 98–107)
CO2 SERPL-SCNC: 21 MMOL/L (ref 22–30)
EOSINOPHIL # BLD AUTO: 0.4 K/UL (ref 0–0.7)
EOSINOPHIL NFR BLD AUTO: 6 %
ERYTHROCYTE [DISTWIDTH] IN BLOOD BY AUTOMATED COUNT: 4.29 M/UL (ref 3.8–5.4)
ERYTHROCYTE [DISTWIDTH] IN BLOOD: 13.1 % (ref 11.5–15.5)
GLUCOSE BLD-MCNC: 75 MG/DL (ref 75–99)
GLUCOSE SERPL-MCNC: 93 MG/DL (ref 74–99)
HCT VFR BLD AUTO: 39 % (ref 34–46)
HGB BLD-MCNC: 13.3 GM/DL (ref 11.4–16)
INR PPP: 1 (ref ?–1.2)
LYMPHOCYTES # SPEC AUTO: 1.7 K/UL (ref 1–4.8)
LYMPHOCYTES NFR SPEC AUTO: 27 %
MCH RBC QN AUTO: 31 PG (ref 25–35)
MCHC RBC AUTO-ENTMCNC: 34.2 G/DL (ref 31–37)
MCV RBC AUTO: 90.7 FL (ref 80–100)
MONOCYTES # BLD AUTO: 0.5 K/UL (ref 0–1)
MONOCYTES NFR BLD AUTO: 8 %
NEUTROPHILS # BLD AUTO: 3.4 K/UL (ref 1.3–7.7)
NEUTROPHILS NFR BLD AUTO: 55 %
PH UR: 7 [PH] (ref 5–8)
PLATELET # BLD AUTO: 268 K/UL (ref 150–450)
POTASSIUM SERPL-SCNC: 4.4 MMOL/L (ref 3.5–5.1)
PROT SERPL-MCNC: 6.6 G/DL (ref 6.3–8.2)
PT BLD: 10.3 SEC (ref 9–12)
SODIUM SERPL-SCNC: 141 MMOL/L (ref 137–145)
SP GR UR: 1.01 (ref 1–1.03)
UROBILINOGEN UR QL STRIP: <2 MG/DL (ref ?–2)
WBC # BLD AUTO: 6.1 K/UL (ref 3.8–10.6)

## 2021-08-02 PROCEDURE — 81003 URINALYSIS AUTO W/O SCOPE: CPT

## 2021-08-02 PROCEDURE — 93005 ELECTROCARDIOGRAM TRACING: CPT

## 2021-08-02 PROCEDURE — 84484 ASSAY OF TROPONIN QUANT: CPT

## 2021-08-02 PROCEDURE — 36415 COLL VENOUS BLD VENIPUNCTURE: CPT

## 2021-08-02 PROCEDURE — 80053 COMPREHEN METABOLIC PANEL: CPT

## 2021-08-02 PROCEDURE — 85610 PROTHROMBIN TIME: CPT

## 2021-08-02 PROCEDURE — 85025 COMPLETE CBC W/AUTO DIFF WBC: CPT

## 2021-08-02 PROCEDURE — 85730 THROMBOPLASTIN TIME PARTIAL: CPT

## 2021-08-02 PROCEDURE — 71046 X-RAY EXAM CHEST 2 VIEWS: CPT

## 2021-08-02 PROCEDURE — 99285 EMERGENCY DEPT VISIT HI MDM: CPT

## 2021-08-02 NOTE — ED
General Adult HPI





- General


Chief complaint: Altered Mental Status


Stated complaint: altered


Time Seen by Provider: 21 10:45


Source: patient, EMS, RN notes reviewed


Mode of arrival: EMS


Limitations: no limitations





- History of Present Illness


Initial comments: 





This a 80-year-old female presents emergency Department for evaluation of 

possible episode of confusion.  Patient was at home as daughter called EMS 

patient was having an acute panic attack, hyperventilating upon arrival EMS she 

is calm down and has no complaints at this time.  Denies any headache, blurred 

vision, abdominal pain, nausea vomiting diarrhea constipation no chest pain or s

hortness of breath.  Patient states she feels like her usual self at this time.





- Related Data


                                Home Medications











 Medication  Instructions  Recorded  Confirmed


 


Simvastatin [Zocor] 80 mg PO HS 14


 


Carvedilol [Coreg] 12.5 mg PO BID 19


 


Repaglinide [Prandin] 1 mg PO TID 20


 


Dulaglutide [Trulicity] 0.75 mg SQ FR 20








                                  Previous Rx's











 Medication  Instructions  Recorded


 


Gabapentin [Neurontin] 300 mg PO TID  cap 21


 


HYDROcodone/APAP 5-325MG [Norco 1 each PO Q6HR PRN  tab 21





5-325]  


 


Indomethacin [Indocin] 25 mg PO TID  cap 21











                                    Allergies











Allergy/AdvReac Type Severity Reaction Status Date / Time


 


No Known Allergies Allergy   Verified 21 16:16














Review of Systems


ROS Statement: 


Those systems with pertinent positive or pertinent negative responses have been 

documented in the HPI.





ROS Other: All systems not noted in ROS Statement are negative.





Past Medical History


Past Medical History: Asthma, Heart Failure, COPD, CVA/TIA, Diabetes Mellitus, 

Hyperlipidemia, Hypertension, Memory Impairment, Osteoarthritis (OA), Pneumonia,

 Skin Disorder, Syncope


Additional Past Medical History / Comment(s): NIDDM type II, neuropathy 

bilateral hands/legs/feet, past bilateral lower leg cellulitis/wound, bilateral 

lower leg edema at times, bronchitis, TIA x 2, occasional low back pain, chronic

 L leg pain since fall approximately one year ago-pt states physician has run 

tests on leg but no answer why it is painful, L foot gout.


History of Any Multi-Drug Resistant Organisms: None Reported


Past Surgical History: Bladder Surgery, Hysterectomy, Tonsillectomy


Additional Past Surgical History / Comment(s): Cystocele, R leg vein stripping, 

pain clinic procedures, bilateral cataract removals, colonoscopy, teeth 

extracted.


Past Anesthesia/Blood Transfusion Reactions: No Reported Reaction, Motion S

ickness


Past Psychological History: Anxiety


Smoking Status: Former smoker


Past Alcohol Use History: None Reported


Past Drug Use History: None Reported





- Past Family History


  ** Father


Family Medical History: No Reported History


Additional Family Medical History / Comment(s): Father was healthy.  He lived to

 be 87yrs old.





  ** Mother


Family Medical History: Diabetes Mellitus


Additional Family Medical History / Comment(s): Mother  of diabetic 

complications at the age of 61 yrs.





General Exam


Limitations: no limitations


General appearance: alert, in no apparent distress


Head exam: Present: atraumatic, normocephalic, normal inspection


Eye exam: Present: normal appearance, PERRL, EOMI.  Absent: scleral icterus, 

conjunctival injection, periorbital swelling


ENT exam: Present: normal exam, normal oropharynx, mucous membranes moist


Neck exam: Present: normal inspection, full ROM.  Absent: tenderness, 

meningismus, lymphadenopathy


Respiratory exam: Present: normal lung sounds bilaterally.  Absent: respiratory 

distress, wheezes, rales, rhonchi, stridor


Cardiovascular Exam: Present: regular rate, normal rhythm, normal heart sounds. 

 Absent: systolic murmur, diastolic murmur, rubs, gallop, clicks


GI/Abdominal exam: Present: soft, normal bowel sounds.  Absent: distended, 

tenderness, guarding, rebound, rigid


Neurological exam: Present: alert, oriented X3, CN II-XII intact


Skin exam: Present: warm, dry, intact, normal color.  Absent: rash





Course


                                   Vital Signs











  21





  10:43


 


Temperature 97.6 F


 


Pulse Rate 70


 


Respiratory 18





Rate 


 


Blood Pressure 183/69


 


O2 Sat by Pulse 99





Oximetry 














Medical Decision Making





- Medical Decision Making





Labs EKG chest x-ray all unremarkable.  Patient was having a panic attack upon 

arrival EMS.  She has no complaints is awake alert and orientated family updated

 on results.





- Lab Data


Result diagrams: 


                                 21 11:00





                                 21 11:00


                                   Lab Results











  21 Range/Units





  11:00 11:00 11:00 


 


WBC  6.1    (3.8-10.6)  k/uL


 


RBC  4.29    (3.80-5.40)  m/uL


 


Hgb  13.3    (11.4-16.0)  gm/dL


 


Hct  39.0    (34.0-46.0)  %


 


MCV  90.7    (80.0-100.0)  fL


 


MCH  31.0    (25.0-35.0)  pg


 


MCHC  34.2    (31.0-37.0)  g/dL


 


RDW  13.1    (11.5-15.5)  %


 


Plt Count  268    (150-450)  k/uL


 


MPV  8.4    


 


Neutrophils %  55    %


 


Lymphocytes %  27    %


 


Monocytes %  8    %


 


Eosinophils %  6    %


 


Basophils %  1    %


 


Neutrophils #  3.4    (1.3-7.7)  k/uL


 


Lymphocytes #  1.7    (1.0-4.8)  k/uL


 


Monocytes #  0.5    (0-1.0)  k/uL


 


Eosinophils #  0.4    (0-0.7)  k/uL


 


Basophils #  0.0    (0-0.2)  k/uL


 


PT   10.3   (9.0-12.0)  sec


 


INR   1.0   (<1.2)  


 


APTT   21.0 L   (22.0-30.0)  sec


 


Sodium     (137-145)  mmol/L


 


Potassium     (3.5-5.1)  mmol/L


 


Chloride     ()  mmol/L


 


Carbon Dioxide     (22-30)  mmol/L


 


Anion Gap     mmol/L


 


BUN     (7-17)  mg/dL


 


Creatinine     (0.52-1.04)  mg/dL


 


Est GFR (CKD-EPI)AfAm     (>60 ml/min/1.73 sqM)  


 


Est GFR (CKD-EPI)NonAf     (>60 ml/min/1.73 sqM)  


 


Glucose     (74-99)  mg/dL


 


POC Glucose (mg/dL)     (75-99)  mg/dL


 


POC Glu Operater ID     


 


Calcium     (8.4-10.2)  mg/dL


 


Total Bilirubin     (0.2-1.3)  mg/dL


 


AST     (14-36)  U/L


 


ALT     (4-34)  U/L


 


Alkaline Phosphatase     ()  U/L


 


Troponin I     (0.000-0.034)  ng/mL


 


Total Protein     (6.3-8.2)  g/dL


 


Albumin     (3.5-5.0)  g/dL


 


Urine Color    Yellow  


 


Urine Appearance    Clear  (Clear)  


 


Urine pH    7.0  (5.0-8.0)  


 


Ur Specific Gravity    1.013  (1.001-1.035)  


 


Urine Protein    Negative  (Negative)  


 


Urine Glucose (UA)    Negative  (Negative)  


 


Urine Ketones    Negative  (Negative)  


 


Urine Blood    Negative  (Negative)  


 


Urine Nitrite    Negative  (Negative)  


 


Urine Bilirubin    Negative  (Negative)  


 


Urine Urobilinogen    <2.0  (<2.0)  mg/dL


 


Ur Leukocyte Esterase    Negative  (Negative)  














  21 Range/Units





  11:00 11:00 11:07 


 


WBC     (3.8-10.6)  k/uL


 


RBC     (3.80-5.40)  m/uL


 


Hgb     (11.4-16.0)  gm/dL


 


Hct     (34.0-46.0)  %


 


MCV     (80.0-100.0)  fL


 


MCH     (25.0-35.0)  pg


 


MCHC     (31.0-37.0)  g/dL


 


RDW     (11.5-15.5)  %


 


Plt Count     (150-450)  k/uL


 


MPV     


 


Neutrophils %     %


 


Lymphocytes %     %


 


Monocytes %     %


 


Eosinophils %     %


 


Basophils %     %


 


Neutrophils #     (1.3-7.7)  k/uL


 


Lymphocytes #     (1.0-4.8)  k/uL


 


Monocytes #     (0-1.0)  k/uL


 


Eosinophils #     (0-0.7)  k/uL


 


Basophils #     (0-0.2)  k/uL


 


PT     (9.0-12.0)  sec


 


INR     (<1.2)  


 


APTT     (22.0-30.0)  sec


 


Sodium  141    (137-145)  mmol/L


 


Potassium  4.4    (3.5-5.1)  mmol/L


 


Chloride  109 H    ()  mmol/L


 


Carbon Dioxide  21 L    (22-30)  mmol/L


 


Anion Gap  11    mmol/L


 


BUN  27 H    (7-17)  mg/dL


 


Creatinine  1.17 H    (0.52-1.04)  mg/dL


 


Est GFR (CKD-EPI)AfAm  51    (>60 ml/min/1.73 sqM)  


 


Est GFR (CKD-EPI)NonAf  44    (>60 ml/min/1.73 sqM)  


 


Glucose  93    (74-99)  mg/dL


 


POC Glucose (mg/dL)    75  (75-99)  mg/dL


 


POC Glu Operater ID    Rosa Isela Durbin  


 


Calcium  9.8    (8.4-10.2)  mg/dL


 


Total Bilirubin  0.6    (0.2-1.3)  mg/dL


 


AST  22    (14-36)  U/L


 


ALT  10    (4-34)  U/L


 


Alkaline Phosphatase  85    ()  U/L


 


Troponin I   <0.012   (0.000-0.034)  ng/mL


 


Total Protein  6.6    (6.3-8.2)  g/dL


 


Albumin  4.1    (3.5-5.0)  g/dL


 


Urine Color     


 


Urine Appearance     (Clear)  


 


Urine pH     (5.0-8.0)  


 


Ur Specific Gravity     (1.001-1.035)  


 


Urine Protein     (Negative)  


 


Urine Glucose (UA)     (Negative)  


 


Urine Ketones     (Negative)  


 


Urine Blood     (Negative)  


 


Urine Nitrite     (Negative)  


 


Urine Bilirubin     (Negative)  


 


Urine Urobilinogen     (<2.0)  mg/dL


 


Ur Leukocyte Esterase     (Negative)  














Disposition


Clinical Impression: 


 Panic attack, Anxiety





Disposition: HOME SELF-CARE


Condition: Stable


Instructions (If sedation given, give patient instructions):  Anxiety (ED)


Additional Instructions: 


Please return to the Emergency Department if symptoms worsen or any other 

concerns.


Is patient prescribed a controlled substance at d/c from ED?: No


Referrals: 


Poli Pate MD [Primary Care Provider] - 1-2 days


Time of Disposition: 12:40

## 2021-08-02 NOTE — XR
EXAMINATION TYPE: XR chest 2V

 

DATE OF EXAM: 8/2/2021

 

COMPARISON: 5/19/2021

 

HISTORY: 80-year-old female confusion, altered mental status

 

TECHNIQUE:  AP and lateral views

 

FINDINGS:  

Heart mildly enlarged. Slight leftward patient rotation. Mild atherosclerotic calcifications througho
ut the aorta. Hyperinflation. Strandy atelectasis at lung bases. No consolidation or effusion.

 

 

IMPRESSION:  

Mild cardiomegaly. COPD. No definite acute process.